# Patient Record
Sex: FEMALE | Race: WHITE | NOT HISPANIC OR LATINO
[De-identification: names, ages, dates, MRNs, and addresses within clinical notes are randomized per-mention and may not be internally consistent; named-entity substitution may affect disease eponyms.]

---

## 2017-01-17 ENCOUNTER — LABORATORY RESULT (OUTPATIENT)
Age: 77
End: 2017-01-17

## 2017-01-17 ENCOUNTER — APPOINTMENT (OUTPATIENT)
Dept: RHEUMATOLOGY | Facility: CLINIC | Age: 77
End: 2017-01-17

## 2017-01-31 ENCOUNTER — APPOINTMENT (OUTPATIENT)
Dept: OPHTHALMOLOGY | Facility: CLINIC | Age: 77
End: 2017-01-31

## 2017-01-31 DIAGNOSIS — H10.33 UNSPECIFIED ACUTE CONJUNCTIVITIS, BILATERAL: ICD-10-CM

## 2017-01-31 DIAGNOSIS — B30.9 VIRAL CONJUNCTIVITIS, UNSPECIFIED: ICD-10-CM

## 2017-02-08 ENCOUNTER — APPOINTMENT (OUTPATIENT)
Dept: RHEUMATOLOGY | Facility: CLINIC | Age: 77
End: 2017-02-08

## 2017-02-08 VITALS
SYSTOLIC BLOOD PRESSURE: 127 MMHG | WEIGHT: 1 LBS | HEART RATE: 85 BPM | OXYGEN SATURATION: 96 % | TEMPERATURE: 97 F | DIASTOLIC BLOOD PRESSURE: 78 MMHG

## 2017-02-13 ENCOUNTER — APPOINTMENT (OUTPATIENT)
Dept: RHEUMATOLOGY | Facility: CLINIC | Age: 77
End: 2017-02-13

## 2017-02-27 ENCOUNTER — APPOINTMENT (OUTPATIENT)
Dept: OPHTHALMOLOGY | Facility: CLINIC | Age: 77
End: 2017-02-27

## 2017-03-20 ENCOUNTER — LABORATORY RESULT (OUTPATIENT)
Age: 77
End: 2017-03-20

## 2017-03-20 ENCOUNTER — RX RENEWAL (OUTPATIENT)
Age: 77
End: 2017-03-20

## 2017-03-20 ENCOUNTER — APPOINTMENT (OUTPATIENT)
Dept: RHEUMATOLOGY | Facility: CLINIC | Age: 77
End: 2017-03-20

## 2017-04-03 ENCOUNTER — APPOINTMENT (OUTPATIENT)
Dept: OPHTHALMOLOGY | Facility: CLINIC | Age: 77
End: 2017-04-03

## 2017-04-17 ENCOUNTER — LABORATORY RESULT (OUTPATIENT)
Age: 77
End: 2017-04-17

## 2017-04-17 ENCOUNTER — APPOINTMENT (OUTPATIENT)
Dept: RHEUMATOLOGY | Facility: CLINIC | Age: 77
End: 2017-04-17

## 2017-04-26 ENCOUNTER — APPOINTMENT (OUTPATIENT)
Dept: OPHTHALMOLOGY | Facility: CLINIC | Age: 77
End: 2017-04-26

## 2017-05-15 ENCOUNTER — LABORATORY RESULT (OUTPATIENT)
Age: 77
End: 2017-05-15

## 2017-05-15 ENCOUNTER — APPOINTMENT (OUTPATIENT)
Dept: RHEUMATOLOGY | Facility: CLINIC | Age: 77
End: 2017-05-15

## 2017-05-22 ENCOUNTER — APPOINTMENT (OUTPATIENT)
Dept: OPHTHALMOLOGY | Facility: CLINIC | Age: 77
End: 2017-05-22

## 2017-06-05 ENCOUNTER — APPOINTMENT (OUTPATIENT)
Dept: OPHTHALMOLOGY | Facility: CLINIC | Age: 77
End: 2017-06-05

## 2017-06-08 ENCOUNTER — LABORATORY RESULT (OUTPATIENT)
Age: 77
End: 2017-06-08

## 2017-06-08 ENCOUNTER — APPOINTMENT (OUTPATIENT)
Dept: RHEUMATOLOGY | Facility: CLINIC | Age: 77
End: 2017-06-08

## 2017-06-12 ENCOUNTER — APPOINTMENT (OUTPATIENT)
Dept: OPHTHALMOLOGY | Facility: CLINIC | Age: 77
End: 2017-06-12

## 2017-06-22 ENCOUNTER — APPOINTMENT (OUTPATIENT)
Dept: OPHTHALMOLOGY | Facility: CLINIC | Age: 77
End: 2017-06-22

## 2017-07-10 ENCOUNTER — APPOINTMENT (OUTPATIENT)
Dept: RHEUMATOLOGY | Facility: CLINIC | Age: 77
End: 2017-07-10

## 2017-07-10 ENCOUNTER — LABORATORY RESULT (OUTPATIENT)
Age: 77
End: 2017-07-10

## 2017-07-14 ENCOUNTER — APPOINTMENT (OUTPATIENT)
Dept: OPHTHALMOLOGY | Facility: CLINIC | Age: 77
End: 2017-07-14

## 2017-07-17 ENCOUNTER — APPOINTMENT (OUTPATIENT)
Dept: OPHTHALMOLOGY | Facility: CLINIC | Age: 77
End: 2017-07-17

## 2017-07-26 ENCOUNTER — APPOINTMENT (OUTPATIENT)
Dept: OPHTHALMOLOGY | Facility: CLINIC | Age: 77
End: 2017-07-26

## 2017-07-28 ENCOUNTER — APPOINTMENT (OUTPATIENT)
Dept: OPHTHALMOLOGY | Facility: CLINIC | Age: 77
End: 2017-07-28
Payer: MEDICARE

## 2017-07-28 PROCEDURE — 92331: CPT | Mod: NC

## 2017-07-28 RX ORDER — SODIUM CHLORIDE FOR INHALATION 0.9 %
0.9 VIAL, NEBULIZER (ML) INHALATION
Qty: 100 | Refills: 8 | Status: ACTIVE | COMMUNITY
Start: 2017-07-28 | End: 1900-01-01

## 2017-07-30 ENCOUNTER — EMERGENCY (EMERGENCY)
Facility: HOSPITAL | Age: 77
LOS: 1 days | Discharge: HOME | End: 2017-07-30
Attending: EMERGENCY MEDICINE | Admitting: EMERGENCY MEDICINE
Payer: MEDICARE

## 2017-07-30 VITALS
DIASTOLIC BLOOD PRESSURE: 64 MMHG | TEMPERATURE: 98 F | RESPIRATION RATE: 16 BRPM | OXYGEN SATURATION: 100 % | HEART RATE: 68 BPM | SYSTOLIC BLOOD PRESSURE: 149 MMHG

## 2017-07-30 DIAGNOSIS — Z98.89 OTHER SPECIFIED POSTPROCEDURAL STATES: Chronic | ICD-10-CM

## 2017-07-30 DIAGNOSIS — H26.9 UNSPECIFIED CATARACT: Chronic | ICD-10-CM

## 2017-07-30 PROCEDURE — 99284 EMERGENCY DEPT VISIT MOD MDM: CPT | Mod: GC

## 2017-07-30 RX ORDER — OFLOXACIN 0.3 %
1 DROPS OPHTHALMIC (EYE)
Qty: 1 | Refills: 0 | OUTPATIENT
Start: 2017-07-30 | End: 2017-08-06

## 2017-07-30 NOTE — ED PROVIDER NOTE - PMH
Biliary cirrhosis    Hypothyroid    Nephrolithiasis    Osteopenia    Rheumatoid arthritis    Sjoegren syndrome    Type 2 diabetes mellitus

## 2017-07-30 NOTE — ED PROVIDER NOTE - OBJECTIVE STATEMENT
78 y/o F pt w pmhx of RA, DM, Hypothyroidism presents with c/o probable left eye lens in her eye that she is not sure is in or out today. Pt states she placed her lenses in both eyes and when she tried to take them out she was only able to take right one out. Pt is not sure if she was actually able to place the lens initially. Pt states these are hard lenses and she is supposed to dip her eye in the solution to get the lens in. Pt denies blurry vision, double vision, eye pain, watery discharge, drainage.

## 2017-07-30 NOTE — ED PROVIDER NOTE - ATTENDING CONTRIBUTION TO CARE
pt with questionable FB sensation to L eye.  Exam without any FB.  Question small abrasion.  Will dc home

## 2017-07-30 NOTE — ED PROVIDER NOTE - CHPI ED SYMPTOMS NEG
no itching/no discharge/no double vision/no blurred vision/no eye lid swelling/no drainage/no foreign body

## 2017-07-30 NOTE — ED PROVIDER NOTE - CARE PLAN
Instructions for follow-up, activity and diet:	Rest, drink plenty of fluids.  Advance activity as tolerated.  Continue all previously prescribed medications as directed.  Take Ofloxacin in effected eye as directed. Follow up with your primary care physician in 48-72 hours- bring copies of your results.  Return to the ER for worsening or persistent symptoms, and/or ANY NEW OR CONCERNING SYMPTOMS. If you have issues obtaining follow up, please call: [phone] to obtain a doctor or specialist who takes your insurance in your area. Instructions for follow-up, activity and diet:	Rest, drink plenty of fluids.  Advance activity as tolerated.  Continue all previously prescribed medications as directed.  Take Ofloxacin in effected eye as directed. Follow up with your primary care physician in 48-72 hours.  Return to the ER for worsening or persistent symptoms, and/or ANY NEW OR CONCERNING SYMPTOMS. If you have issues obtaining follow up, please call: [phone] to obtain a doctor or specialist who takes your insurance in your area. Principal Discharge DX:	Corneal abrasion  Instructions for follow-up, activity and diet:	Rest, drink plenty of fluids.  Advance activity as tolerated.  Continue all previously prescribed medications as directed.  Take Ofloxacin in effected eye as directed. Follow up with your primary care physician in 48-72 hours.  Return to the ER for worsening or persistent symptoms, and/or ANY NEW OR CONCERNING SYMPTOMS. If you have issues obtaining follow up, please call: [phone] to obtain a doctor or specialist who takes your insurance in your area. Principal Discharge DX:	Corneal abrasion  Instructions for follow-up, activity and diet:	Rest, drink plenty of fluids.  Advance activity as tolerated.  Continue all previously prescribed medications as directed.  Take Ofloxacin in effected eye as directed. Follow up with your primary care physician in 48-72 hours.  Return to the ER for worsening or persistent symptoms, and/or ANY NEW OR CONCERNING SYMPTOMS. If you have issues obtaining follow up, please call: [phone] to obtain a doctor or specialist who takes your insurance in your area.

## 2017-07-30 NOTE — ED PROVIDER NOTE - MEDICAL DECISION MAKING DETAILS
78 y/o pt presents to confirm if she has lens in her left eye  foreign body (lens) in left eye, corneal abrasion  fluorescin stain, slit lamp

## 2017-07-30 NOTE — ED ADULT TRIAGE NOTE - CHIEF COMPLAINT QUOTE
pt got new contact lenses, put one in this morning and can not tell if it is still in left eye. eye appears red, watery. denies blurry vision.

## 2017-07-30 NOTE — ED PROVIDER NOTE - PLAN OF CARE
Rest, drink plenty of fluids.  Advance activity as tolerated.  Continue all previously prescribed medications as directed.  Take Ofloxacin in effected eye as directed. Follow up with your primary care physician in 48-72 hours- bring copies of your results.  Return to the ER for worsening or persistent symptoms, and/or ANY NEW OR CONCERNING SYMPTOMS. If you have issues obtaining follow up, please call: [phone] to obtain a doctor or specialist who takes your insurance in your area. Rest, drink plenty of fluids.  Advance activity as tolerated.  Continue all previously prescribed medications as directed.  Take Ofloxacin in effected eye as directed. Follow up with your primary care physician in 48-72 hours.  Return to the ER for worsening or persistent symptoms, and/or ANY NEW OR CONCERNING SYMPTOMS. If you have issues obtaining follow up, please call: [phone] to obtain a doctor or specialist who takes your insurance in your area.

## 2017-08-07 ENCOUNTER — LABORATORY RESULT (OUTPATIENT)
Age: 77
End: 2017-08-07

## 2017-08-07 ENCOUNTER — APPOINTMENT (OUTPATIENT)
Dept: RHEUMATOLOGY | Facility: CLINIC | Age: 77
End: 2017-08-07
Payer: MEDICARE

## 2017-08-07 PROCEDURE — 36415 COLL VENOUS BLD VENIPUNCTURE: CPT

## 2017-08-07 PROCEDURE — 96413 CHEMO IV INFUSION 1 HR: CPT

## 2017-08-08 ENCOUNTER — APPOINTMENT (OUTPATIENT)
Dept: OPHTHALMOLOGY | Facility: CLINIC | Age: 77
End: 2017-08-08
Payer: MEDICARE

## 2017-08-08 PROCEDURE — 92331: CPT | Mod: NC

## 2017-08-09 ENCOUNTER — APPOINTMENT (OUTPATIENT)
Dept: OPHTHALMOLOGY | Facility: CLINIC | Age: 77
End: 2017-08-09
Payer: MEDICARE

## 2017-08-09 PROCEDURE — 92012 INTRM OPH EXAM EST PATIENT: CPT

## 2017-09-01 ENCOUNTER — LABORATORY RESULT (OUTPATIENT)
Age: 77
End: 2017-09-01

## 2017-09-01 PROCEDURE — 36415 COLL VENOUS BLD VENIPUNCTURE: CPT

## 2017-09-01 PROCEDURE — 96413 CHEMO IV INFUSION 1 HR: CPT

## 2017-09-05 ENCOUNTER — APPOINTMENT (OUTPATIENT)
Dept: RHEUMATOLOGY | Facility: CLINIC | Age: 77
End: 2017-09-05
Payer: MEDICARE

## 2017-09-05 ENCOUNTER — APPOINTMENT (OUTPATIENT)
Dept: RHEUMATOLOGY | Facility: CLINIC | Age: 77
End: 2017-09-05

## 2017-09-06 ENCOUNTER — APPOINTMENT (OUTPATIENT)
Dept: OPHTHALMOLOGY | Facility: CLINIC | Age: 77
End: 2017-09-06
Payer: MEDICARE

## 2017-09-06 PROCEDURE — 92331: CPT | Mod: NC

## 2017-09-14 ENCOUNTER — FORM ENCOUNTER (OUTPATIENT)
Age: 77
End: 2017-09-14

## 2017-09-15 ENCOUNTER — APPOINTMENT (OUTPATIENT)
Dept: RADIOLOGY | Facility: CLINIC | Age: 77
End: 2017-09-15
Payer: MEDICARE

## 2017-09-15 ENCOUNTER — OUTPATIENT (OUTPATIENT)
Dept: OUTPATIENT SERVICES | Facility: HOSPITAL | Age: 77
LOS: 1 days | End: 2017-09-15
Payer: MEDICARE

## 2017-09-15 ENCOUNTER — APPOINTMENT (OUTPATIENT)
Dept: RHEUMATOLOGY | Facility: CLINIC | Age: 77
End: 2017-09-15
Payer: MEDICARE

## 2017-09-15 VITALS
TEMPERATURE: 98.4 F | DIASTOLIC BLOOD PRESSURE: 83 MMHG | HEART RATE: 74 BPM | SYSTOLIC BLOOD PRESSURE: 136 MMHG | OXYGEN SATURATION: 98 %

## 2017-09-15 DIAGNOSIS — M17.10 UNILATERAL PRIMARY OSTEOARTHRITIS, UNSPECIFIED KNEE: ICD-10-CM

## 2017-09-15 DIAGNOSIS — M25.552 PAIN IN LEFT HIP: ICD-10-CM

## 2017-09-15 DIAGNOSIS — H26.9 UNSPECIFIED CATARACT: Chronic | ICD-10-CM

## 2017-09-15 DIAGNOSIS — M06.9 RHEUMATOID ARTHRITIS, UNSPECIFIED: ICD-10-CM

## 2017-09-15 DIAGNOSIS — M25.552 PAIN IN RIGHT HIP: ICD-10-CM

## 2017-09-15 DIAGNOSIS — M25.551 PAIN IN RIGHT HIP: ICD-10-CM

## 2017-09-15 DIAGNOSIS — Z98.89 OTHER SPECIFIED POSTPROCEDURAL STATES: Chronic | ICD-10-CM

## 2017-09-15 PROCEDURE — 72100 X-RAY EXAM L-S SPINE 2/3 VWS: CPT | Mod: 26

## 2017-09-15 PROCEDURE — 73522 X-RAY EXAM HIPS BI 3-4 VIEWS: CPT | Mod: 26

## 2017-09-15 PROCEDURE — 73562 X-RAY EXAM OF KNEE 3: CPT | Mod: 26,50

## 2017-09-15 PROCEDURE — 73562 X-RAY EXAM OF KNEE 3: CPT

## 2017-09-15 PROCEDURE — 99214 OFFICE O/P EST MOD 30 MIN: CPT

## 2017-09-15 PROCEDURE — 73522 X-RAY EXAM HIPS BI 3-4 VIEWS: CPT

## 2017-09-15 PROCEDURE — 72100 X-RAY EXAM L-S SPINE 2/3 VWS: CPT

## 2017-09-20 DIAGNOSIS — M25.559 PAIN IN UNSPECIFIED HIP: ICD-10-CM

## 2017-09-20 DIAGNOSIS — M17.12 UNILATERAL PRIMARY OSTEOARTHRITIS, LEFT KNEE: ICD-10-CM

## 2017-09-20 DIAGNOSIS — M54.9 DORSALGIA, UNSPECIFIED: ICD-10-CM

## 2017-09-20 DIAGNOSIS — M25.561 PAIN IN RIGHT KNEE: ICD-10-CM

## 2017-09-20 DIAGNOSIS — Z96.651 PRESENCE OF RIGHT ARTIFICIAL KNEE JOINT: ICD-10-CM

## 2017-10-02 ENCOUNTER — LABORATORY RESULT (OUTPATIENT)
Age: 77
End: 2017-10-02

## 2017-10-02 ENCOUNTER — APPOINTMENT (OUTPATIENT)
Dept: RHEUMATOLOGY | Facility: CLINIC | Age: 77
End: 2017-10-02
Payer: MEDICARE

## 2017-10-02 ENCOUNTER — MESSAGE (OUTPATIENT)
Age: 77
End: 2017-10-02

## 2017-10-02 PROCEDURE — 96413 CHEMO IV INFUSION 1 HR: CPT

## 2017-10-02 PROCEDURE — 36415 COLL VENOUS BLD VENIPUNCTURE: CPT

## 2017-10-02 PROCEDURE — G0008: CPT

## 2017-10-02 PROCEDURE — 90686 IIV4 VACC NO PRSV 0.5 ML IM: CPT

## 2017-10-04 ENCOUNTER — APPOINTMENT (OUTPATIENT)
Dept: OPHTHALMOLOGY | Facility: CLINIC | Age: 77
End: 2017-10-04
Payer: MEDICARE

## 2017-10-04 PROCEDURE — 92012 INTRM OPH EXAM EST PATIENT: CPT

## 2017-10-04 RX ORDER — OFLOXACIN 3 MG/ML
0.3 SOLUTION/ DROPS OPHTHALMIC 4 TIMES DAILY
Qty: 5 | Refills: 1 | Status: DISCONTINUED | COMMUNITY
Start: 2017-01-31 | End: 2017-10-04

## 2017-10-04 RX ORDER — LOTEPREDNOL ETABONATE 5 MG/G
0.5 OINTMENT OPHTHALMIC
Qty: 1 | Refills: 1 | Status: DISCONTINUED | COMMUNITY
Start: 2017-04-03 | End: 2017-10-04

## 2017-11-03 ENCOUNTER — LABORATORY RESULT (OUTPATIENT)
Age: 77
End: 2017-11-03

## 2017-11-03 ENCOUNTER — APPOINTMENT (OUTPATIENT)
Dept: RHEUMATOLOGY | Facility: CLINIC | Age: 77
End: 2017-11-03
Payer: MEDICARE

## 2017-11-03 PROCEDURE — 96413 CHEMO IV INFUSION 1 HR: CPT

## 2017-11-03 PROCEDURE — 36415 COLL VENOUS BLD VENIPUNCTURE: CPT

## 2017-11-05 ENCOUNTER — INPATIENT (INPATIENT)
Facility: HOSPITAL | Age: 77
LOS: 1 days | Discharge: HOME | DRG: 63 | End: 2017-11-07
Attending: PSYCHIATRY & NEUROLOGY | Admitting: PSYCHIATRY & NEUROLOGY
Payer: MEDICARE

## 2017-11-05 VITALS
RESPIRATION RATE: 20 BRPM | OXYGEN SATURATION: 97 % | HEART RATE: 78 BPM | DIASTOLIC BLOOD PRESSURE: 66 MMHG | SYSTOLIC BLOOD PRESSURE: 136 MMHG

## 2017-11-05 DIAGNOSIS — Z98.89 OTHER SPECIFIED POSTPROCEDURAL STATES: Chronic | ICD-10-CM

## 2017-11-05 DIAGNOSIS — H26.9 UNSPECIFIED CATARACT: Chronic | ICD-10-CM

## 2017-11-05 DIAGNOSIS — I63.9 CEREBRAL INFARCTION, UNSPECIFIED: ICD-10-CM

## 2017-11-05 LAB
ALBUMIN SERPL ELPH-MCNC: 4 G/DL — SIGNIFICANT CHANGE UP (ref 3.3–5)
ALP SERPL-CCNC: 128 U/L — HIGH (ref 40–120)
ALT FLD-CCNC: 21 U/L RC — SIGNIFICANT CHANGE UP (ref 10–45)
ANION GAP SERPL CALC-SCNC: 12 MMOL/L — SIGNIFICANT CHANGE UP (ref 5–17)
APTT BLD: 30 SEC — SIGNIFICANT CHANGE UP (ref 27.5–37.4)
AST SERPL-CCNC: 18 U/L — SIGNIFICANT CHANGE UP (ref 10–40)
BASOPHILS # BLD AUTO: 0.1 K/UL — SIGNIFICANT CHANGE UP (ref 0–0.2)
BASOPHILS NFR BLD AUTO: 1.4 % — SIGNIFICANT CHANGE UP (ref 0–2)
BILIRUB SERPL-MCNC: 0.2 MG/DL — SIGNIFICANT CHANGE UP (ref 0.2–1.2)
BUN SERPL-MCNC: 25 MG/DL — HIGH (ref 7–23)
CALCIUM SERPL-MCNC: 9.9 MG/DL — SIGNIFICANT CHANGE UP (ref 8.4–10.5)
CHLORIDE SERPL-SCNC: 105 MMOL/L — SIGNIFICANT CHANGE UP (ref 96–108)
CK MB CFR SERPL CALC: 1.6 NG/ML — SIGNIFICANT CHANGE UP (ref 0–3.8)
CO2 SERPL-SCNC: 24 MMOL/L — SIGNIFICANT CHANGE UP (ref 22–31)
CREAT SERPL-MCNC: 1.01 MG/DL — SIGNIFICANT CHANGE UP (ref 0.5–1.3)
EOSINOPHIL # BLD AUTO: 0.4 K/UL — SIGNIFICANT CHANGE UP (ref 0–0.5)
EOSINOPHIL NFR BLD AUTO: 5.1 % — SIGNIFICANT CHANGE UP (ref 0–6)
GLUCOSE SERPL-MCNC: 122 MG/DL — HIGH (ref 70–99)
HCT VFR BLD CALC: 42.6 % — SIGNIFICANT CHANGE UP (ref 34.5–45)
HGB BLD-MCNC: 14.1 G/DL — SIGNIFICANT CHANGE UP (ref 11.5–15.5)
INR BLD: 1.05 RATIO — SIGNIFICANT CHANGE UP (ref 0.88–1.16)
LYMPHOCYTES # BLD AUTO: 2.9 K/UL — SIGNIFICANT CHANGE UP (ref 1–3.3)
LYMPHOCYTES # BLD AUTO: 35.1 % — SIGNIFICANT CHANGE UP (ref 13–44)
MCHC RBC-ENTMCNC: 33 GM/DL — SIGNIFICANT CHANGE UP (ref 32–36)
MCHC RBC-ENTMCNC: 33.1 PG — SIGNIFICANT CHANGE UP (ref 27–34)
MCV RBC AUTO: 100 FL — SIGNIFICANT CHANGE UP (ref 80–100)
MONOCYTES # BLD AUTO: 0.7 K/UL — SIGNIFICANT CHANGE UP (ref 0–0.9)
MONOCYTES NFR BLD AUTO: 8.4 % — SIGNIFICANT CHANGE UP (ref 2–14)
NEUTROPHILS # BLD AUTO: 4.2 K/UL — SIGNIFICANT CHANGE UP (ref 1.8–7.4)
NEUTROPHILS NFR BLD AUTO: 50 % — SIGNIFICANT CHANGE UP (ref 43–77)
PLATELET # BLD AUTO: 334 K/UL — SIGNIFICANT CHANGE UP (ref 150–400)
POTASSIUM SERPL-MCNC: 4.1 MMOL/L — SIGNIFICANT CHANGE UP (ref 3.5–5.3)
POTASSIUM SERPL-SCNC: 4.1 MMOL/L — SIGNIFICANT CHANGE UP (ref 3.5–5.3)
PROT SERPL-MCNC: 6.8 G/DL — SIGNIFICANT CHANGE UP (ref 6–8.3)
PROTHROM AB SERPL-ACNC: 11.3 SEC — SIGNIFICANT CHANGE UP (ref 9.8–12.7)
RBC # BLD: 4.24 M/UL — SIGNIFICANT CHANGE UP (ref 3.8–5.2)
RBC # FLD: 13 % — SIGNIFICANT CHANGE UP (ref 10.3–14.5)
SODIUM SERPL-SCNC: 141 MMOL/L — SIGNIFICANT CHANGE UP (ref 135–145)
TROPONIN T SERPL-MCNC: <0.01 NG/ML — SIGNIFICANT CHANGE UP (ref 0–0.06)
WBC # BLD: 8.3 K/UL — SIGNIFICANT CHANGE UP (ref 3.8–10.5)
WBC # FLD AUTO: 8.3 K/UL — SIGNIFICANT CHANGE UP (ref 3.8–10.5)

## 2017-11-05 PROCEDURE — 70496 CT ANGIOGRAPHY HEAD: CPT | Mod: 26

## 2017-11-05 PROCEDURE — 70498 CT ANGIOGRAPHY NECK: CPT | Mod: 26

## 2017-11-05 PROCEDURE — 71010: CPT | Mod: 26

## 2017-11-05 PROCEDURE — 99291 CRITICAL CARE FIRST HOUR: CPT

## 2017-11-05 RX ORDER — ALTEPLASE 100 MG
75.1 KIT INTRAVENOUS ONCE
Qty: 0 | Refills: 0 | Status: COMPLETED | OUTPATIENT
Start: 2017-11-05 | End: 2017-11-05

## 2017-11-05 RX ORDER — LEVOTHYROXINE SODIUM 125 MCG
112 TABLET ORAL DAILY
Qty: 0 | Refills: 0 | Status: DISCONTINUED | OUTPATIENT
Start: 2017-11-05 | End: 2017-11-07

## 2017-11-05 RX ORDER — ATORVASTATIN CALCIUM 80 MG/1
80 TABLET, FILM COATED ORAL AT BEDTIME
Qty: 0 | Refills: 0 | Status: DISCONTINUED | OUTPATIENT
Start: 2017-11-05 | End: 2017-11-07

## 2017-11-05 RX ORDER — SODIUM CHLORIDE 9 MG/ML
1000 INJECTION INTRAMUSCULAR; INTRAVENOUS; SUBCUTANEOUS
Qty: 0 | Refills: 0 | Status: DISCONTINUED | OUTPATIENT
Start: 2017-11-05 | End: 2017-11-06

## 2017-11-05 RX ORDER — ASPIRIN/CALCIUM CARB/MAGNESIUM 324 MG
81 TABLET ORAL DAILY
Qty: 0 | Refills: 0 | Status: DISCONTINUED | OUTPATIENT
Start: 2017-11-05 | End: 2017-11-06

## 2017-11-05 RX ORDER — METFORMIN HYDROCHLORIDE 850 MG/1
1 TABLET ORAL
Qty: 0 | Refills: 0 | COMMUNITY

## 2017-11-05 RX ORDER — METHOTREXATE 2.5 MG/1
12.5 TABLET ORAL
Qty: 0 | Refills: 0 | Status: DISCONTINUED | OUTPATIENT
Start: 2017-11-05 | End: 2017-11-07

## 2017-11-05 RX ORDER — DULOXETINE HYDROCHLORIDE 30 MG/1
60 CAPSULE, DELAYED RELEASE ORAL DAILY
Qty: 0 | Refills: 0 | Status: DISCONTINUED | OUTPATIENT
Start: 2017-11-05 | End: 2017-11-07

## 2017-11-05 RX ORDER — ALTEPLASE 100 MG
8.3 KIT INTRAVENOUS ONCE
Qty: 0 | Refills: 0 | Status: COMPLETED | OUTPATIENT
Start: 2017-11-05 | End: 2017-11-05

## 2017-11-05 RX ORDER — METHOTREXATE 2.5 MG/1
1 TABLET ORAL
Qty: 0 | Refills: 0 | COMMUNITY

## 2017-11-05 RX ADMIN — ALTEPLASE 75.1 MILLIGRAM(S): KIT at 16:43

## 2017-11-05 RX ADMIN — METHOTREXATE 12.5 MILLIGRAM(S): 2.5 TABLET ORAL at 18:03

## 2017-11-05 RX ADMIN — ALTEPLASE 498 MILLIGRAM(S): KIT at 16:42

## 2017-11-05 RX ADMIN — SODIUM CHLORIDE 100 MILLILITER(S): 9 INJECTION INTRAMUSCULAR; INTRAVENOUS; SUBCUTANEOUS at 19:00

## 2017-11-05 NOTE — H&P ADULT - ATTENDING COMMENTS
The patient was seen and examined by me. Imaging (CT head, CTA neck and head) reviewed by me. This is a 77F with T2DM, RA, who presents with acute confusional sta5te, with decrease short term memory, formed visual hallucinations, and ataxia. CT head was negative. She was treated with IV tPA and has been improving since. She is currently awake and alert, Ox3, fluent, follows commands, EOMI, VFF, face symmetric, no drift, 5/5 power x 4 extremities, normal sensation to LT, normal FTN coordination. CTA shows no large vessel occlusion, possible left carotid stenosis versus streak artifact, and no other stenosis. Impression: Cerebral embolism with infarction possible thalamic or brainstem. Plan is for MRI brain, MRA neck with gado, telemetry monitoring, TTE, PT/OT evals, passed dysphagia, advance diet as tolerated, Start ASA/Lovenox VTE prophylaxis 24 hrs post TPA. Continue post tPA neuro checks and VS monitoring, BP,180/105. RISS for T2DM, holding Metformin because of contrast dye yesterday. She is not a smoker. All of her questions were addressed.

## 2017-11-05 NOTE — H&P ADULT - ASSESSMENT
76 yo wf, p/w acute onset of vertigo, peduncular hallucinosis, double vision and imbalance from 13:10, symptoms were resolved in ED however she continued to have imbalance. In exam she was loosing balance to the right, no other finding. tPA discussed and the patient was amenable to it due to importance of ability to ambulate. NIHSS 2, MRS 0  tPA was given at 16:40  plan:  - Admit   - C/W telemetry      - F/U EKG  - C/W ASA 81 daily /Statin 80 mg daily /Heparin SQ for VTE prophylaxis  - F/U TTE  - F/U carotid Duplex    - MRI head non contrast  - MRA H&N  - Repeat CTH in 24 hours   - CBC AM daily  - BMP AM daily  - Lipid panel  - HbA1C  - PT/OT  - Dysphagia screen if fails, Swallow evaluation  - Permissive HTN for 24 hours up to 185/105

## 2017-11-05 NOTE — ED ADULT NURSE NOTE - OBJECTIVE STATEMENT
77y female presents to ED by EMS complaining of AMS, dizziness and unsteady gait. Patients daughter explained that patient was last heard normal at 13:10, patient was supposed to pick daughter up from train and forgot the conversation. When daughter called home to be picked up, patient drove to station - daughter explained that patient drove onto the curb, and seemed altered, when patient got out of the car the daughter states that she was unsteady while walking. Onset of symptoms occurred approximately 14:00. Patient denies chest pain, SOB, abdominal pain, n/v/d, chills and is afebrile. Patient is unsteady on her feet in ED, states that her vision seems blurred, complains of headache. Extremities strong, PERRLA, mentation intact. No facial droop or slurred speech. Code stroke called 15:44.

## 2017-11-05 NOTE — ED PROVIDER NOTE - MEDICAL DECISION MAKING DETAILS
Carlos Manuel: Patient with confusion and ataxia bib ems with daughter. no weakness at this time. ataxic. Code stroke activated.  neuro team at bedside, ct scan ordered, reassess.

## 2017-11-05 NOTE — H&P ADULT - NSHPREVIEWOFSYSTEMS_GEN_ALL_CORE
HENT: no headache, no dizziness, no blurry vision and double vision at the time  Chest: No chest pain, no SOB, no cough  Abdomen: No nausea, no vomitting, no pain  ext: no swelling, no pain, no discoloration

## 2017-11-05 NOTE — H&P ADULT - NSHPPHYSICALEXAM_GEN_ALL_CORE
Neurological Exam:  Mental Status: Orientated to self, date and place.    Attention intact.  No dysarthria, aphasia or neglect. Does not remember the incidence accurately.    Cranial Nerves: CN I - not tested.  PERRL, EOMI, VFF, no nystagmus or diplopia.  No APD.  Fundi not visualized bilaterally.  CN V1-3 intact to light touch and pinprick.  No facial asymmetry.  Hearing intact to finger rub bilaterally.  Tongue, uvula and palate midline.  Sternocleidomastoid and Trapezius intact bilaterally.    Motor:   Tone: normal.                  Strength:     [] Upper extremity                      Delt       Bicep    Tricep                                                  R         5/5 5/5 5/5 5/5                                               L          5/5 5/5 5/5 5/5  [] Lower extremity                       HF          KE          KF        DF         PF                                               R        5/5 5/5 5/5 5/5 5/5                                               L         5/5 5/5 5/5 5/5 5/5  Pronator drift: none                 Dysmetria: None to finger-nose-finger or heel-shin-heel  No truncal ataxia.    Tremor: No resting, postural or action tremor.  No myoclonus.    Sensation: intact to light touch, pinprick, vibration and proprioception    Deep Tendon Reflexes: 1+ bilateral biceps, triceps, brachioradialis, knee and ankle  Toes flexor bilaterally    Gait: unstable, small steps, combination of ataxia and falling to the left and musculoskeletal gait difficulty.

## 2017-11-05 NOTE — H&P ADULT - HISTORY OF PRESENT ILLNESS
The patient is a 76 yo f, with PMHx of RA, and hypothyroidism who p/w acute onset of confusion, double vision, vertigo, and visual hallucinations. Last well known time was at 13:10, when she spoke with her daughter and suppose to go after her. She can't describe her exact symptoms and time of onset after that. She notices double vision, seeing small cars on the grass, seeing small people. She manages to find the daughter who needed to take her out fo the car, she states that she was imbalance and needed help to move around and was also confused. By the time in the Ed the patient was quickly going back to her baseline.

## 2017-11-05 NOTE — H&P ADULT - NSHPLABSRESULTS_GEN_ALL_CORE
< from: CT Angio Head w/ IV Cont (11.05.17 @ 16:08) >    IMPRESSION:   Noncontrast CT brain: There is no evidence of acute intracranial   hemorrhage, extra-axial collection, vasogenic edema, mass effect, midline   shift, central herniation, hydrocephalus or transcortical infarct.     CT angiography neck: Noevidence of hemodynamically significant stenosis   using NASCET criteria.  Patent vertebral arteries.  No evidence of   vascular dissection.    CT angiography brain: No major vessel occlusion or proximal stenosis.    Normal anatomic variants as discussed in the body the report.      < end of copied text >

## 2017-11-05 NOTE — ED PROVIDER NOTE - CRITICAL CARE PROVIDED
telephone consultation with the patient's family/additional history taking/consult w/ pt's family directly relating to pts condition/interpretation of diagnostic studies/direct patient care (not related to procedure)/consultation with other physicians

## 2017-11-05 NOTE — ED PROVIDER NOTE - PROGRESS NOTE DETAILS
Decision by neuro team that patient is candidate for tpa. Patient asking daughters for approval. Daughter requesting few extra minutes to consent while they talk to family member on phone. Daughters agree to tpa. tpa given by neuro team. Decision by neuro team that patient is candidate for tpa. Patient asking daughters for approval. Daughter requesting few extra minutes to consent while they talk to family member on phone. Advised it is time sensitive.

## 2017-11-05 NOTE — ED PROVIDER NOTE - OBJECTIVE STATEMENT
78 yo F, PMHx of RA and hypothyroid presents to ED w/acute onset of confusion, double vision, vertigo, and visual hallucinations. Last known normal was 13:10 77 year old female with pmhx of RA and hypothyroid bib ems with daughter presents with  acute onset of confusion, double vision, vertigo.. Last well known time was at 13:10, when she spoke with her daughter on phone and supposed to go  daughter from train station. Daughter was at train station waiting and called her back and patient did not recall conversation or task to pick her up.  Patient showed up to train station and was not driving properly. After getting out of the car, daughter noticed patient ataxic gait. Patient brought to ER, still not able to recall conversation.  No double vision at this time.

## 2017-11-06 LAB
ALBUMIN SERPL ELPH-MCNC: 3.4 G/DL — SIGNIFICANT CHANGE UP (ref 3.3–5)
ALP SERPL-CCNC: 124 U/L — HIGH (ref 40–120)
ALT FLD-CCNC: 19 U/L — SIGNIFICANT CHANGE UP (ref 10–45)
ANION GAP SERPL CALC-SCNC: 13 MMOL/L — SIGNIFICANT CHANGE UP (ref 5–17)
AST SERPL-CCNC: 24 U/L — SIGNIFICANT CHANGE UP (ref 10–40)
BASOPHILS # BLD AUTO: 0.05 K/UL — SIGNIFICANT CHANGE UP (ref 0–0.2)
BASOPHILS NFR BLD AUTO: 0.6 % — SIGNIFICANT CHANGE UP (ref 0–2)
BILIRUB SERPL-MCNC: 0.3 MG/DL — SIGNIFICANT CHANGE UP (ref 0.2–1.2)
BUN SERPL-MCNC: 17 MG/DL — SIGNIFICANT CHANGE UP (ref 7–23)
CALCIUM SERPL-MCNC: 9.1 MG/DL — SIGNIFICANT CHANGE UP (ref 8.4–10.5)
CHLORIDE SERPL-SCNC: 106 MMOL/L — SIGNIFICANT CHANGE UP (ref 96–108)
CHOLEST SERPL-MCNC: 195 MG/DL — SIGNIFICANT CHANGE UP (ref 10–199)
CO2 SERPL-SCNC: 22 MMOL/L — SIGNIFICANT CHANGE UP (ref 22–31)
CREAT SERPL-MCNC: 0.75 MG/DL — SIGNIFICANT CHANGE UP (ref 0.5–1.3)
EOSINOPHIL # BLD AUTO: 0.28 K/UL — SIGNIFICANT CHANGE UP (ref 0–0.5)
EOSINOPHIL NFR BLD AUTO: 3.3 % — SIGNIFICANT CHANGE UP (ref 0–6)
GLUCOSE BLDC GLUCOMTR-MCNC: 106 MG/DL — HIGH (ref 70–99)
GLUCOSE BLDC GLUCOMTR-MCNC: 109 MG/DL — HIGH (ref 70–99)
GLUCOSE BLDC GLUCOMTR-MCNC: 75 MG/DL — SIGNIFICANT CHANGE UP (ref 70–99)
GLUCOSE SERPL-MCNC: 93 MG/DL — SIGNIFICANT CHANGE UP (ref 70–99)
HBA1C BLD-MCNC: 5.4 % — SIGNIFICANT CHANGE UP (ref 4–5.6)
HCT VFR BLD CALC: 39.4 % — SIGNIFICANT CHANGE UP (ref 34.5–45)
HDLC SERPL-MCNC: 55 MG/DL — SIGNIFICANT CHANGE UP (ref 40–125)
HGB BLD-MCNC: 12.9 G/DL — SIGNIFICANT CHANGE UP (ref 11.5–15.5)
IMM GRANULOCYTES NFR BLD AUTO: 0.1 % — SIGNIFICANT CHANGE UP (ref 0–1.5)
LIPID PNL WITH DIRECT LDL SERPL: 122 MG/DL — SIGNIFICANT CHANGE UP
LYMPHOCYTES # BLD AUTO: 2.75 K/UL — SIGNIFICANT CHANGE UP (ref 1–3.3)
LYMPHOCYTES # BLD AUTO: 32.8 % — SIGNIFICANT CHANGE UP (ref 13–44)
MCHC RBC-ENTMCNC: 31.8 PG — SIGNIFICANT CHANGE UP (ref 27–34)
MCHC RBC-ENTMCNC: 32.7 GM/DL — SIGNIFICANT CHANGE UP (ref 32–36)
MCV RBC AUTO: 97 FL — SIGNIFICANT CHANGE UP (ref 80–100)
MONOCYTES # BLD AUTO: 0.62 K/UL — SIGNIFICANT CHANGE UP (ref 0–0.9)
MONOCYTES NFR BLD AUTO: 7.4 % — SIGNIFICANT CHANGE UP (ref 2–14)
NEUTROPHILS # BLD AUTO: 4.68 K/UL — SIGNIFICANT CHANGE UP (ref 1.8–7.4)
NEUTROPHILS NFR BLD AUTO: 55.8 % — SIGNIFICANT CHANGE UP (ref 43–77)
PLATELET # BLD AUTO: 306 K/UL — SIGNIFICANT CHANGE UP (ref 150–400)
POTASSIUM SERPL-MCNC: 4 MMOL/L — SIGNIFICANT CHANGE UP (ref 3.5–5.3)
POTASSIUM SERPL-SCNC: 4 MMOL/L — SIGNIFICANT CHANGE UP (ref 3.5–5.3)
PROT SERPL-MCNC: 6.4 G/DL — SIGNIFICANT CHANGE UP (ref 6–8.3)
RBC # BLD: 4.06 M/UL — SIGNIFICANT CHANGE UP (ref 3.8–5.2)
RBC # FLD: 14.1 % — SIGNIFICANT CHANGE UP (ref 10.3–14.5)
SODIUM SERPL-SCNC: 141 MMOL/L — SIGNIFICANT CHANGE UP (ref 135–145)
TOTAL CHOLESTEROL/HDL RATIO MEASUREMENT: 3.5 RATIO — SIGNIFICANT CHANGE UP (ref 3.3–7.1)
TRIGL SERPL-MCNC: 91 MG/DL — SIGNIFICANT CHANGE UP (ref 10–149)
WBC # BLD: 8.39 K/UL — SIGNIFICANT CHANGE UP (ref 3.8–10.5)
WBC # FLD AUTO: 8.39 K/UL — SIGNIFICANT CHANGE UP (ref 3.8–10.5)

## 2017-11-06 PROCEDURE — 70551 MRI BRAIN STEM W/O DYE: CPT | Mod: 26

## 2017-11-06 PROCEDURE — 99223 1ST HOSP IP/OBS HIGH 75: CPT

## 2017-11-06 PROCEDURE — 70544 MR ANGIOGRAPHY HEAD W/O DYE: CPT | Mod: 26,59

## 2017-11-06 PROCEDURE — 70549 MR ANGIOGRAPH NECK W/O&W/DYE: CPT | Mod: 26

## 2017-11-06 RX ORDER — SODIUM CHLORIDE 9 MG/ML
1000 INJECTION, SOLUTION INTRAVENOUS
Qty: 0 | Refills: 0 | Status: DISCONTINUED | OUTPATIENT
Start: 2017-11-06 | End: 2017-11-07

## 2017-11-06 RX ORDER — DEXTROSE 50 % IN WATER 50 %
25 SYRINGE (ML) INTRAVENOUS ONCE
Qty: 0 | Refills: 0 | Status: DISCONTINUED | OUTPATIENT
Start: 2017-11-06 | End: 2017-11-07

## 2017-11-06 RX ORDER — GLUCAGON INJECTION, SOLUTION 0.5 MG/.1ML
1 INJECTION, SOLUTION SUBCUTANEOUS ONCE
Qty: 0 | Refills: 0 | Status: DISCONTINUED | OUTPATIENT
Start: 2017-11-06 | End: 2017-11-07

## 2017-11-06 RX ORDER — INSULIN LISPRO 100/ML
VIAL (ML) SUBCUTANEOUS
Qty: 0 | Refills: 0 | Status: DISCONTINUED | OUTPATIENT
Start: 2017-11-06 | End: 2017-11-07

## 2017-11-06 RX ORDER — DEXTROSE 50 % IN WATER 50 %
12.5 SYRINGE (ML) INTRAVENOUS ONCE
Qty: 0 | Refills: 0 | Status: DISCONTINUED | OUTPATIENT
Start: 2017-11-06 | End: 2017-11-07

## 2017-11-06 RX ORDER — DEXTROSE 50 % IN WATER 50 %
1 SYRINGE (ML) INTRAVENOUS ONCE
Qty: 0 | Refills: 0 | Status: DISCONTINUED | OUTPATIENT
Start: 2017-11-06 | End: 2017-11-07

## 2017-11-06 RX ADMIN — DULOXETINE HYDROCHLORIDE 60 MILLIGRAM(S): 30 CAPSULE, DELAYED RELEASE ORAL at 12:52

## 2017-11-06 RX ADMIN — Medication 112 MICROGRAM(S): at 06:05

## 2017-11-06 RX ADMIN — SODIUM CHLORIDE 100 MILLILITER(S): 9 INJECTION INTRAMUSCULAR; INTRAVENOUS; SUBCUTANEOUS at 06:05

## 2017-11-06 RX ADMIN — ATORVASTATIN CALCIUM 80 MILLIGRAM(S): 80 TABLET, FILM COATED ORAL at 23:13

## 2017-11-06 NOTE — OCCUPATIONAL THERAPY INITIAL EVALUATION ADULT - ADDITIONAL COMMENTS
CT brain: There is no evidence of acute intracranial hemorrhage extra-axial collection, vasogenic edema, mass effect, midline  shift, central herniation, hydrocephalus or transcortical infarct.   CT angiography neck: No evidence of hemodynamically significant stenosis using NASCET criteria.  Patent vertebral arteries.  No evidence of vascular dissection.  CT angiography brain: No major vessel occlusion or proximal stenosis. Normal anatomic variants as discussed in the body the report.

## 2017-11-06 NOTE — OCCUPATIONAL THERAPY INITIAL EVALUATION ADULT - LIVES WITH, PROFILE
Pt lives alone in an apartment with 1 step to enter. Pt has a walk in shower with grab bars in the bathroom. Prior, pt I with ADLs and functional mobility./alone

## 2017-11-06 NOTE — OCCUPATIONAL THERAPY INITIAL EVALUATION ADULT - PRECAUTIONS/LIMITATIONS, REHAB EVAL
seizure precautions/aspiration precautions/fall precautions seizure precautions/aspiration precautions

## 2017-11-06 NOTE — OCCUPATIONAL THERAPY INITIAL EVALUATION ADULT - PERTINENT HX OF CURRENT PROBLEM, REHAB EVAL
78 yo F p/w acute onset of vertigo, peduncular hallucinosis, double vision and imbalance from 13:10, symptoms were resolved in ED however she continued to have imbalance. In exam she was losing balance to the R, no other finding. tPA discussed and the pt was amenable to it due to importance of ability to ambulate. see more..

## 2017-11-07 ENCOUNTER — TRANSCRIPTION ENCOUNTER (OUTPATIENT)
Age: 77
End: 2017-11-07

## 2017-11-07 VITALS
SYSTOLIC BLOOD PRESSURE: 129 MMHG | DIASTOLIC BLOOD PRESSURE: 81 MMHG | HEART RATE: 78 BPM | RESPIRATION RATE: 19 BRPM | OXYGEN SATURATION: 99 % | TEMPERATURE: 98 F

## 2017-11-07 LAB
GLUCOSE BLDC GLUCOMTR-MCNC: 92 MG/DL — SIGNIFICANT CHANGE UP (ref 70–99)
GLUCOSE BLDC GLUCOMTR-MCNC: 96 MG/DL — SIGNIFICANT CHANGE UP (ref 70–99)

## 2017-11-07 PROCEDURE — 93306 TTE W/DOPPLER COMPLETE: CPT | Mod: 26

## 2017-11-07 PROCEDURE — 99233 SBSQ HOSP IP/OBS HIGH 50: CPT

## 2017-11-07 RX ORDER — ENOXAPARIN SODIUM 100 MG/ML
40 INJECTION SUBCUTANEOUS DAILY
Qty: 0 | Refills: 0 | Status: DISCONTINUED | OUTPATIENT
Start: 2017-11-07 | End: 2017-11-07

## 2017-11-07 RX ORDER — NYSTATIN CREAM 100000 [USP'U]/G
1 CREAM TOPICAL THREE TIMES A DAY
Qty: 0 | Refills: 0 | Status: DISCONTINUED | OUTPATIENT
Start: 2017-11-07 | End: 2017-11-07

## 2017-11-07 RX ORDER — ASPIRIN/CALCIUM CARB/MAGNESIUM 324 MG
1 TABLET ORAL
Qty: 30 | Refills: 0
Start: 2017-11-07 | End: 2017-12-07

## 2017-11-07 RX ORDER — ATORVASTATIN CALCIUM 80 MG/1
1 TABLET, FILM COATED ORAL
Qty: 30 | Refills: 2
Start: 2017-11-07 | End: 2018-02-04

## 2017-11-07 RX ORDER — INSULIN LISPRO 100/ML
VIAL (ML) SUBCUTANEOUS AT BEDTIME
Qty: 0 | Refills: 0 | Status: DISCONTINUED | OUTPATIENT
Start: 2017-11-07 | End: 2017-11-07

## 2017-11-07 RX ORDER — ASPIRIN/CALCIUM CARB/MAGNESIUM 324 MG
81 TABLET ORAL DAILY
Qty: 0 | Refills: 0 | Status: DISCONTINUED | OUTPATIENT
Start: 2017-11-07 | End: 2017-11-07

## 2017-11-07 RX ADMIN — Medication 81 MILLIGRAM(S): at 05:25

## 2017-11-07 RX ADMIN — NYSTATIN CREAM 1 APPLICATION(S): 100000 CREAM TOPICAL at 12:34

## 2017-11-07 RX ADMIN — DULOXETINE HYDROCHLORIDE 60 MILLIGRAM(S): 30 CAPSULE, DELAYED RELEASE ORAL at 12:33

## 2017-11-07 RX ADMIN — ENOXAPARIN SODIUM 40 MILLIGRAM(S): 100 INJECTION SUBCUTANEOUS at 05:26

## 2017-11-07 RX ADMIN — ENOXAPARIN SODIUM 40 MILLIGRAM(S): 100 INJECTION SUBCUTANEOUS at 12:33

## 2017-11-07 RX ADMIN — Medication 81 MILLIGRAM(S): at 12:33

## 2017-11-07 RX ADMIN — Medication 112 MICROGRAM(S): at 05:26

## 2017-11-07 NOTE — DISCHARGE NOTE ADULT - HOSPITAL COURSE
outpatient PT The patient is a 78 yo f, with PMHx of RA, and hypothyroidism who p/w acute onset of confusion, double vision, vertigo, and visual hallucinations. Last well known time was at 13:10, when she spoke with her daughter and suppose to go after her. She can't describe her exact symptoms and time of onset after that. She notices double vision, seeing small cars on the grass, seeing small people. She manages to find the daughter who needed to take her out fo the car, she states that she was imbalance and needed help to move around and was also confused. By the time in the Ed the patient was quickly going back to her baseline.     The patient continued to remain at her baseline neurologically and her MRI was negative for an MRI. Vessel imaging also didn't reveal any related large vessel occlusion.   TTE revealed ____.     Patient was evaluated by PT and it was determined she was best suited for outpatient PT. The patient is a 78 yo f, with PMHx of RA, and hypothyroidism who p/w acute onset of confusion, double vision, vertigo, and visual hallucinations. Last well known time was at 13:10, when she spoke with her daughter and suppose to go after her. She can't describe her exact symptoms and time of onset after that. She notices double vision, seeing small cars on the grass, seeing small people. She manages to find the daughter who needed to take her out fo the car, she states that she was imbalance and needed help to move around and was also confused. By the time in the Ed the patient was quickly going back to her baseline.     The patient continued to remain at her baseline neurologically and her MRI was negative for an MRI. Vessel imaging also didn't reveal any related large vessel occlusion.   TTE was performed, results are pending and will be available at follow up.     Patient was evaluated by PT and it was determined she was best suited for outpatient PT.

## 2017-11-07 NOTE — DIETITIAN INITIAL EVALUATION ADULT. - NS FNS WEIGHT CHANGE REASON
intentional/pt reports intentional wt loss over past few months, unsure of how much she has lost, unsure of usual wt

## 2017-11-07 NOTE — DISCHARGE NOTE ADULT - NS AS DC STROKE ED MATERIALS
Stroke Warning Signs and Symptoms/Call 911 for Stroke/Risk Factors for Stroke/Prescribed Medications/Need for Followup After Discharge/Stroke Education Booklet

## 2017-11-07 NOTE — DIETITIAN INITIAL EVALUATION ADULT. - OTHER INFO
Pt seen for: length of stay.   Adm dx:  CVA   GI issues: denies N/V/D/C  Last BM: pt unsure    Food allergies: NKFA    Vit/supplement PTA:  folic acid, biotin

## 2017-11-07 NOTE — DIETITIAN INITIAL EVALUATION ADULT. - ENERGY NEEDS
Ht: 64"   Wt: 205  BMI:35.1  kg/m2   IBW: 120 (+/-10%)    171 % IBW  Edema:  none  Skin: no pressure injuries

## 2017-11-07 NOTE — DISCHARGE NOTE ADULT - CARE PROVIDER_API CALL
Jorge Paredes ([employer]), Neurology; Vascular Neurology  [address] [address]  Phone: [phone]  Fax: [phone]

## 2017-11-07 NOTE — DIETITIAN INITIAL EVALUATION ADULT. - ADHERENCE
tries to watch carbohydrate intake. Diet hx: Breakfast will have cereal or yogurt, occasionally eggs, Lunch has salad w/ chicken or tuna, Dinner has a protein, vegetable, salad. Checks BG at home, usually around 93.

## 2017-11-07 NOTE — PROGRESS NOTE ADULT - ASSESSMENT
ASSESSMENT: 77F with T2DM, RA, who presents with acute confusional sta5te, with decrease short term memory, formed visual hallucinations, and ataxia. CT head was negative. She was treated with IV tPA and has improved since. Impression: Cerebral embolism without infarction possible thalamic or brainstem of undetermined etiology.     NEURO: neurologically appears back to independent functional baseline, continue close monitoring for neurologic deterioration, permissive HTN with titration to normotension, titrate statin to LDL goal less than 70, MR imaging as noted above. Physical therapy/OT eval for home with outpatient therapy.     ANTITHROMBOTIC THERAPY: ASA     PULMONARY: CXR clear, protecting airway, saturating well     CARDIOVASCULAR: check TTE, cardiac monitoring no events                              SBP goal: normotension    GASTROINTESTINAL:  dysphagia screen passed, on regular diet and tolerating well        Diet: regular     RENAL: BUN/Cr without acute change, maintain adequate hydration, good urine output      Na Goal: Greater than 135     Sousa: n     HEMATOLOGY: no active bleeding     DVT ppx: Heparin s.c [] LMWH [x]     ID: afebrile, no si/sx of infection     OTHER: current medical condition and plan of care d/w patient at bedside in extent.  Will d/w her sister Anushka as well per request.     DISPOSITION: Home with outpatient therapy as workup complete.       CORE MEASURES:        Admission NIHSS: 3     TPA: [x] YES [] NO      LDL/HDL: 122/55     Depression Screen: 0     Statin Therapy: y      Dysphagia Screen: [x] PASS [] FAIL     Smoking [] YES [x] NO      Afib [] YES [x] NO     Stroke Education [x] YES [] NO ASSESSMENT: 77F with T2DM, RA, who presents with acute confusional sta5te, with decrease short term memory, formed visual hallucinations, and ataxia. MRI brain negative. She was treated with IV tPA and has improved since. Impression: Cerebral embolism without infarction aborted post IV tPA..     NEURO: neurologically appears back to independent functional baseline, continue close monitoring for neurologic deterioration, permissive HTN with titration to normotension, titrate statin to LDL goal less than 70, MR imaging as noted above. Physical therapy/OT eval for home with outpatient therapy.     ANTITHROMBOTIC THERAPY: ASA     PULMONARY: CXR clear, protecting airway, saturating well     CARDIOVASCULAR: check TTE, cardiac monitoring no events                              SBP goal: normotension    GASTROINTESTINAL:  dysphagia screen passed, on regular diet and tolerating well        Diet: regular     RENAL: BUN/Cr without acute change, maintain adequate hydration, good urine output      Na Goal: Greater than 135     Sousa: n     HEMATOLOGY: no active bleeding     DVT ppx: Heparin s.c [] LMWH [x]     ID: afebrile, no si/sx of infection     OTHER: current medical condition and plan of care d/w patient at bedside in extent.  Will d/w her sister Anushka as well per request.     DISPOSITION: Home with outpatient therapy as workup complete.       CORE MEASURES:        Admission NIHSS: 3     TPA: [x] YES [] NO      LDL/HDL: 122/55     Depression Screen: 0     Statin Therapy: y      Dysphagia Screen: [x] PASS [] FAIL     Smoking [] YES [x] NO      Afib [] YES [x] NO     Stroke Education [x] YES [] NO

## 2017-11-07 NOTE — PROGRESS NOTE ADULT - SUBJECTIVE AND OBJECTIVE BOX
THE PATIENT WAS SEEN AND EXAMINED BY ME WITH THE HOUSESTAFF AND STROKE TEAM DURING MORNING ROUNDS.   HPI:  The patient is a 76 yo f, with PMHx of RA, and hypothyroidism recent travel to [facility] who p/w acute onset of confusion, double vision, vertigo, and visual hallucinations. Last well known time was at 13:10 11/5, when she spoke with her daughter and suppose to go after her. She can't describe her exact symptoms and time of onset after that. She notices double vision, seeing small cars on the grass, seeing small people. She manages to find the daughter who needed to take her out fo the car, she states that she was imbalance and needed help to move around and was also confused. Received TPA.    SUBJECTIVE: No events overnight.  No new neurologic complaints.      aspirin enteric coated 81 milliGRAM(s) Oral daily  atorvastatin 80 milliGRAM(s) Oral at bedtime  dextrose 5%. 1000 milliLiter(s) IV Continuous <Continuous>  dextrose 50% Injectable 12.5 Gram(s) IV Push once  dextrose 50% Injectable 25 Gram(s) IV Push once  dextrose 50% Injectable 25 Gram(s) IV Push once  dextrose Gel 1 Dose(s) Oral once PRN  DULoxetine 60 milliGRAM(s) Oral daily  enoxaparin Injectable 40 milliGRAM(s) SubCutaneous daily  glucagon  Injectable 1 milliGRAM(s) IntraMuscular once PRN  insulin lispro (HumaLOG) corrective regimen sliding scale   SubCutaneous at bedtime  insulin lispro (HumaLOG) corrective regimen sliding scale   SubCutaneous three times a day before meals  levothyroxine 112 MICROGram(s) Oral daily  methotrexate (Non - oncologic) 12.5 milliGRAM(s) Oral every week  nystatin Powder 1 Application(s) Topical three times a day      PHYSICAL EXAM:   Vital Signs Last 24 Hrs  T(C): 36.4 (07 Nov 2017 07:37), Max: 36.9 (06 Nov 2017 20:00)  T(F): 97.6 (07 Nov 2017 07:37), Max: 98.4 (06 Nov 2017 20:00)  HR: 90 (07 Nov 2017 10:35) (65 - 90)  BP: 136/71 (07 Nov 2017 10:35) (124/95 - 163/82)  BP(mean): 89 (07 Nov 2017 10:35) (84 - 111)  RR: 27 (07 Nov 2017 10:35) (14 - 27)  SpO2: 98% (07 Nov 2017 10:35) (93% - 98%)    General: No acute distress  HEENT: EOM intact, visual fields full  Abdomen: Soft, nontender, nondistended   Extremities: No edema    NEUROLOGICAL EXAM:  Mental status: Awake, alert, oriented x3, no aphasia, no neglect, normal memory   Cranial Nerves: No facial asymmetry, no nystagmus, no dysarthria,  tongue midline  Motor exam: Normal tone, no drift, 5/5 RUE, 5/5 RLE, 5/5 LUE, 5/5 LLE, normal fine finger movements.  Sensation: Intact to light touch   Coordination/ Gait: No dysmetria     LABS:                        12.9   8.39  )-----------( 306      ( 06 Nov 2017 07:09 )             39.4    11-06    141  |  106  |  17  ----------------------------<  93  4.0   |  22  |  0.75    Ca    9.1      06 Nov 2017 07:16    TPro  6.4  /  Alb  3.4  /  TBili  0.3  /  DBili  x   /  AST  24  /  ALT  19  /  AlkPhos  124<H>  11-06  PT/INR - ( 05 Nov 2017 15:54 )   PT: 11.3 sec;   INR: 1.05 ratio         PTT - ( 05 Nov 2017 15:54 )  PTT:30.0 sec  Hemoglobin A1C, Whole Blood: 5.4 % (11-06 @ 07:09)      IMAGING: Reviewed by me.   MRI/MRA  Angio Head/Neck  No Cont (11.06.17)   Age-appropriate involutional andischemic gliotic changes. No   acute infarcts, hemorrhage or mass. No abnormal enhancement. Normal MRA   of the head and neck. No significant change since 11/5/2017.

## 2017-11-07 NOTE — DISCHARGE NOTE ADULT - PATIENT PORTAL LINK FT
“You can access the [facility] Patient Portal, offered by [facility], by registering with the following website: [email or web address]”

## 2017-11-07 NOTE — DISCHARGE NOTE ADULT - CARE PLAN
Principal Discharge DX:	Cerebrovascular accident (CVA), unspecified mechanism  Goal:	Reduce risk for recurrence  Instructions for follow-up, activity and diet:	Take aspirin and lipitor.  Follow up with vascular neurology in 1-2 weeks.  No driving until follow up with neurology.  Secondary Diagnosis:	Rheumatoid arthritis  Instructions for follow-up, activity and diet:	Take your medications as directed.  Secondary Diagnosis:	Hypothyroid  Secondary Diagnosis:	Type 2 diabetes mellitus

## 2017-11-07 NOTE — PHYSICAL THERAPY INITIAL EVALUATION ADULT - PERTINENT HX OF CURRENT PROBLEM, REHAB EVAL
p/w acute onset of confusion, double vision, vertigo, & visual hallucinations. Last well known time was at 13:10. symptoms were resolved in ED however, continued to have imbalance (loosing balance to the right), no other findings. tPA discussed and the patient was amenable to it due to importance of ability to ambulate. NIHSS 2, MRS 0

## 2017-11-07 NOTE — DISCHARGE NOTE ADULT - PLAN OF CARE
Reduce risk for recurrence Take aspirin and lipitor.  Follow up with vascular neurology in 1-2 weeks.  No driving until follow up with neurology. Take your medications as directed.

## 2017-11-07 NOTE — DISCHARGE NOTE ADULT - MEDICATION SUMMARY - MEDICATIONS TO TAKE
I will START or STAY ON the medications listed below when I get home from the hospital:    aspirin 81 mg oral delayed release tablet  -- 1 tab(s) by mouth once a day  -- Indication: For CVA    DULoxetine 60 mg oral delayed release capsule  -- 1 cap(s) by mouth once a day  -- Indication: For RA    traZODone 50 mg oral tablet  -- 0.5 tab(s) by mouth once a day (at bedtime)  -- Indication: For sleep    metFORMIN 500 mg oral tablet, extended release  -- 2 tab(s) by mouth once a day (in the evening)  -- Indication: For DMII    atorvastatin 80 mg oral tablet  -- 1 tab(s) by mouth once a day (at bedtime)  -- Indication: For CVA    methotrexate 2.5 mg oral tablet  -- 5 tab(s) by mouth once a week (Sun)  -- Indication: For RA    econazole 1% topical cream  -- Apply on skin to affected area once a day  -- Indication: For RA    Hydrocort 2.5% topical cream  -- Apply on skin to affected area once a day  -- Indication: For RA    Orencia  -- intravenous once a month  -- Indication: For RA    Synthroid 112 mcg (0.112 mg) oral tablet  -- 1 tab(s) by mouth once a day  -- Indication: For hypothyroidism    folic acid  -- 1 tab(s) by mouth once a day  -- Indication: For RA    biotin  -- 1 tab(s) by mouth once a day  -- Indication: For RA I will START or STAY ON the medications listed below when I get home from the hospital:    Outpatient Physical Therapy   -- patient to continue w/ physical therapy for stroke   -- Indication: For Cerebral infarction    aspirin 81 mg oral delayed release tablet  -- 1 tab(s) by mouth once a day  -- Indication: For CVA    DULoxetine 60 mg oral delayed release capsule  -- 1 cap(s) by mouth once a day  -- Indication: For RA    traZODone 50 mg oral tablet  -- 0.5 tab(s) by mouth once a day (at bedtime)  -- Indication: For sleep    metFORMIN 500 mg oral tablet, extended release  -- 2 tab(s) by mouth once a day (in the evening)  -- Indication: For DMII    atorvastatin 80 mg oral tablet  -- 1 tab(s) by mouth once a day (at bedtime)  -- Indication: For CVA    methotrexate 2.5 mg oral tablet  -- 5 tab(s) by mouth once a week (Sun)  -- Indication: For RA    econazole 1% topical cream  -- Apply on skin to affected area once a day  -- Indication: For RA    Hydrocort 2.5% topical cream  -- Apply on skin to affected area once a day  -- Indication: For RA    Orencia  -- intravenous once a month  -- Indication: For RA    Synthroid 112 mcg (0.112 mg) oral tablet  -- 1 tab(s) by mouth once a day  -- Indication: For hypothyroidism    folic acid  -- 1 tab(s) by mouth once a day  -- Indication: For RA    biotin  -- 1 tab(s) by mouth once a day  -- Indication: For RA

## 2017-11-09 ENCOUNTER — APPOINTMENT (OUTPATIENT)
Dept: NEUROLOGY | Facility: CLINIC | Age: 77
End: 2017-11-09
Payer: MEDICARE

## 2017-11-09 VITALS
SYSTOLIC BLOOD PRESSURE: 125 MMHG | BODY MASS INDEX: 29.02 KG/M2 | HEIGHT: 64 IN | WEIGHT: 170 LBS | DIASTOLIC BLOOD PRESSURE: 81 MMHG | HEART RATE: 96 BPM

## 2017-11-09 DIAGNOSIS — H04.129 DRY EYE SYNDROME OF UNSPECIFIED LACRIMAL GLAND: ICD-10-CM

## 2017-11-09 DIAGNOSIS — Z78.9 OTHER SPECIFIED HEALTH STATUS: ICD-10-CM

## 2017-11-09 DIAGNOSIS — Z83.3 FAMILY HISTORY OF DIABETES MELLITUS: ICD-10-CM

## 2017-11-09 DIAGNOSIS — I66.9 OCCLUSION AND STENOSIS OF UNSPECIFIED CEREBRAL ARTERY: ICD-10-CM

## 2017-11-09 DIAGNOSIS — Z87.891 PERSONAL HISTORY OF NICOTINE DEPENDENCE: ICD-10-CM

## 2017-11-09 PROCEDURE — 99215 OFFICE O/P EST HI 40 MIN: CPT

## 2017-11-19 ENCOUNTER — TRANSCRIPTION ENCOUNTER (OUTPATIENT)
Age: 77
End: 2017-11-19

## 2017-11-24 RX ORDER — ATORVASTATIN CALCIUM 80 MG/1
80 TABLET, FILM COATED ORAL
Qty: 30 | Refills: 0 | Status: DISCONTINUED | COMMUNITY
Start: 2017-11-07 | End: 2017-11-24

## 2017-12-04 ENCOUNTER — LABORATORY RESULT (OUTPATIENT)
Age: 77
End: 2017-12-04

## 2017-12-04 ENCOUNTER — APPOINTMENT (OUTPATIENT)
Dept: RHEUMATOLOGY | Facility: CLINIC | Age: 77
End: 2017-12-04
Payer: MEDICARE

## 2017-12-04 PROCEDURE — 36415 COLL VENOUS BLD VENIPUNCTURE: CPT

## 2017-12-04 PROCEDURE — 96413 CHEMO IV INFUSION 1 HR: CPT

## 2017-12-13 ENCOUNTER — APPOINTMENT (OUTPATIENT)
Dept: NEUROLOGY | Facility: CLINIC | Age: 77
End: 2017-12-13
Payer: MEDICARE

## 2017-12-13 PROCEDURE — 95819 EEG AWAKE AND ASLEEP: CPT

## 2017-12-14 PROCEDURE — 95953: CPT

## 2017-12-15 PROCEDURE — 95953: CPT

## 2017-12-16 PROCEDURE — 95953: CPT

## 2017-12-19 PROCEDURE — 99291 CRITICAL CARE FIRST HOUR: CPT | Mod: 25

## 2017-12-19 PROCEDURE — 85730 THROMBOPLASTIN TIME PARTIAL: CPT

## 2017-12-19 PROCEDURE — 85610 PROTHROMBIN TIME: CPT

## 2017-12-19 PROCEDURE — 70544 MR ANGIOGRAPHY HEAD W/O DYE: CPT

## 2017-12-19 PROCEDURE — 80053 COMPREHEN METABOLIC PANEL: CPT

## 2017-12-19 PROCEDURE — 70498 CT ANGIOGRAPHY NECK: CPT

## 2017-12-19 PROCEDURE — 70496 CT ANGIOGRAPHY HEAD: CPT

## 2017-12-19 PROCEDURE — 93005 ELECTROCARDIOGRAM TRACING: CPT

## 2017-12-19 PROCEDURE — 83036 HEMOGLOBIN GLYCOSYLATED A1C: CPT

## 2017-12-19 PROCEDURE — 70549 MR ANGIOGRAPH NECK W/O&W/DYE: CPT

## 2017-12-19 PROCEDURE — 84484 ASSAY OF TROPONIN QUANT: CPT

## 2017-12-19 PROCEDURE — 93306 TTE W/DOPPLER COMPLETE: CPT

## 2017-12-19 PROCEDURE — 85027 COMPLETE CBC AUTOMATED: CPT

## 2017-12-19 PROCEDURE — 80061 LIPID PANEL: CPT

## 2017-12-19 PROCEDURE — 82553 CREATINE MB FRACTION: CPT

## 2017-12-19 PROCEDURE — 97161 PT EVAL LOW COMPLEX 20 MIN: CPT

## 2017-12-19 PROCEDURE — 97165 OT EVAL LOW COMPLEX 30 MIN: CPT

## 2017-12-19 PROCEDURE — 82962 GLUCOSE BLOOD TEST: CPT

## 2017-12-19 PROCEDURE — 70551 MRI BRAIN STEM W/O DYE: CPT

## 2017-12-19 PROCEDURE — 71045 X-RAY EXAM CHEST 1 VIEW: CPT

## 2017-12-19 PROCEDURE — A9585: CPT

## 2017-12-19 PROCEDURE — 70450 CT HEAD/BRAIN W/O DYE: CPT

## 2017-12-30 ENCOUNTER — EMERGENCY (EMERGENCY)
Facility: HOSPITAL | Age: 77
LOS: 1 days | Discharge: HOME | End: 2017-12-30
Attending: EMERGENCY MEDICINE | Admitting: EMERGENCY MEDICINE
Payer: MEDICARE

## 2017-12-30 VITALS
RESPIRATION RATE: 16 BRPM | SYSTOLIC BLOOD PRESSURE: 120 MMHG | DIASTOLIC BLOOD PRESSURE: 76 MMHG | TEMPERATURE: 98 F | OXYGEN SATURATION: 98 % | HEART RATE: 92 BPM

## 2017-12-30 VITALS
DIASTOLIC BLOOD PRESSURE: 84 MMHG | RESPIRATION RATE: 18 BRPM | WEIGHT: 179.9 LBS | TEMPERATURE: 98 F | HEART RATE: 96 BPM | SYSTOLIC BLOOD PRESSURE: 145 MMHG | OXYGEN SATURATION: 98 %

## 2017-12-30 DIAGNOSIS — H26.9 UNSPECIFIED CATARACT: Chronic | ICD-10-CM

## 2017-12-30 DIAGNOSIS — Z98.89 OTHER SPECIFIED POSTPROCEDURAL STATES: Chronic | ICD-10-CM

## 2017-12-30 PROCEDURE — 99284 EMERGENCY DEPT VISIT MOD MDM: CPT

## 2017-12-30 PROCEDURE — 99283 EMERGENCY DEPT VISIT LOW MDM: CPT | Mod: GC

## 2017-12-30 NOTE — ED ADULT NURSE NOTE - CHIEF COMPLAINT QUOTE
"My doctor said there's something wrong with my carotid artery because I can't open my mouth" - denies CP/SOB/HA/N/V. Pt is speaking coherently, no resp distress noted.

## 2017-12-30 NOTE — ED PROVIDER NOTE - CARE PLAN
Principal Discharge DX:	Throat discomfort  Instructions for follow-up, activity and diet:	Follow up with your primary doctor within the next 1-2 days  If you have any worsening or changing symptoms such as confusion, loss of consciousness, worsening pain, nausea/vomiting, or if you have any other concerns please return to the emergency department

## 2017-12-30 NOTE — ED PROVIDER NOTE - PROGRESS NOTE DETAILS
Carlos: Discussed with Dr. 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 He doesn't know anymore.   Patient's exam unremarkable, speech clear, gait normal, no neuro findings, no findings of riya syndrome, throat clear, pt can open mouth a normal distance, no signs of infection, afebrile, no recent trauma or whiplash, no abrasions, no signs of sepsis, normal ROM neck. Discharging patient with return precautions. Patient was evaluated by me, found in no acute distress, calm, speaking in complete sentences. Physical exam is unremarkable, with patient having no difficulty opening/closing mouth. Pharynx and airway are unremarkable. Patient with non-toxic appearance, no signs of Jacy's syndrome, and unremakable appearing neck. Recommendations for rest and follow-up with PMD in 24-48 hours were given to patient. Patient is stable for discharge. Will discharge home. Dr. RYDER agree with resident assessment and plan. Dr. Mark Mcmanus

## 2017-12-30 NOTE — ED PROVIDER NOTE - MEDICAL DECISION MAKING DETAILS
78yo hx of RA, sjoegren, biliary cirrhosis, DM, hypothyroid, presenting with 1wk of chills, sore throat, and inability to "open mouth as wide as normal". Went to urgent care yesterday. Was told she had a "carotid artery infection or blockage" and was started on clindamycin. Exam unremarkable. Unclear what she was sent in for. Will try to get a hold of Dr. Jorge Paredes to learn more. 76yo hx of RA, sjoegren, biliary cirrhosis, DM, hypothyroid, presenting with 1wk of chills, sore throat, and inability to "open mouth as wide as normal". Went to urgent care yesterday. Was told she had a "carotid artery infection or blockage" and was started on clindamycin. Exam unremarkable. Unclear what she was sent in for. Will try to get a hold of Dr. Jorge Paredes to learn more.  Dr. Mcmanus: Female patient with complaints of throat discomfort, told at urgent care she had a carotid artery infection. Patient found in no acute distress, calm, speaking in complete sentences. Physical exam is unremarkable, with patient having no difficulty opening/closing mouth. Pharynx and airway are unremarkable. Patient with non-toxic appearance, no signs of Jacy's syndrome, and unremarkable appearing neck. Recommendations for rest and follow-up with PMD in 24-48 hours were given to patient.

## 2017-12-30 NOTE — ED ADULT NURSE NOTE - OBJECTIVE STATEMENT
76 y/o F pt present to ED for sore throat, cough, fever, chills, pt has been having sore cough, fever and chills times 4 days along with difficult opening mouth, pt went to urgent care yesterday and was told she had infection of carotid artery, was discharge with PO antibiotic, spoke to neurologist who told her to come to ED, on exam pt A&OX3, breathing spontaneous, unlabored without distress on room air, left side throat pain, throat NT, - swelling, - redness, no difficult swallowing, pt afebrile, denies dizziness, SOB, chest pain, dysuria, hematuria, no vision changes

## 2017-12-30 NOTE — ED PROVIDER NOTE - OBJECTIVE STATEMENT
76yo hx of RA, sjoegren, biliary cirrhosis, DM, hypothyroid, presenting with 1wk of chills, sore throat, and inability to "open mouth as wide as normal". Went to urgent care yesterday. No labs or imaging performed. Was told she had a "carotid artery infection or blockage" and was started on clindamycin. Called her PMD/Neurologist Dr. Jorge Paredes who told her to come to ED. Notes she is on a heart monitor for 30 days since she had a mini stroke earlier in Nov.

## 2017-12-30 NOTE — ED PROVIDER NOTE - PHYSICAL EXAMINATION
Gen: No acute distress, alert, cooperative  Head: Normocephalic, Atraumatic  HEENT: PERRL, oral mucosa moist, no exudate, erythema or swelling in oral cavity, normal conjunctiva. Facial bones and sinus non-tender other than mild tenderness over cheeks.   Lung: CTAB, no respiratory distress, no crackles or wheezes  CV: rrr, no murmur  Abd: soft, NTND  MSK: No LE edema, no visible deformities, walking into ED  Neuro: No focal neurologic deficits, normal strength and sensation throughout  Skin: No rash  Psych: normal affect, follows commands

## 2017-12-30 NOTE — ED ADULT TRIAGE NOTE - CHIEF COMPLAINT QUOTE
"My doctor said there's something wrong with my carotid artery because I can't open my mouth" - denies CP/SOB/HA/N/V. Pt is speaking coherently, no resp distress noted. "My doctor said there's something wrong with my carotid artery because I can't open my mouth" - denies CP/SOB/HA/N/V. Pt is speaking coherently, no resp distress noted, no diagnostics done

## 2017-12-30 NOTE — ED PROVIDER NOTE - NS ED ROS FT
ROS: no eye pain, no vision changes. no CP. no SOB, no n/v/d/c, no abd pain. no rash. no bleeding. no urinary complaints. no weakness, no extremity swelling. no HA. no neck/back pain.

## 2017-12-30 NOTE — ED PROVIDER NOTE - PLAN OF CARE
Follow up with your primary doctor within the next 1-2 days  If you have any worsening or changing symptoms such as confusion, loss of consciousness, worsening pain, nausea/vomiting, or if you have any other concerns please return to the emergency department

## 2018-01-02 ENCOUNTER — APPOINTMENT (OUTPATIENT)
Dept: NEUROLOGY | Facility: CLINIC | Age: 78
End: 2018-01-02
Payer: MEDICARE

## 2018-01-02 VITALS
WEIGHT: 170 LBS | BODY MASS INDEX: 29.02 KG/M2 | DIASTOLIC BLOOD PRESSURE: 80 MMHG | SYSTOLIC BLOOD PRESSURE: 122 MMHG | HEIGHT: 64 IN | HEART RATE: 97 BPM

## 2018-01-02 DIAGNOSIS — I63.02 CEREBRAL INFARCTION DUE TO THROMBOSIS OF BASILAR ARTERY: ICD-10-CM

## 2018-01-02 PROCEDURE — 99215 OFFICE O/P EST HI 40 MIN: CPT

## 2018-01-03 ENCOUNTER — APPOINTMENT (OUTPATIENT)
Dept: RHEUMATOLOGY | Facility: CLINIC | Age: 78
End: 2018-01-03
Payer: MEDICARE

## 2018-01-09 ENCOUNTER — APPOINTMENT (OUTPATIENT)
Dept: RHEUMATOLOGY | Facility: CLINIC | Age: 78
End: 2018-01-09
Payer: MEDICARE

## 2018-01-09 ENCOUNTER — LABORATORY RESULT (OUTPATIENT)
Age: 78
End: 2018-01-09

## 2018-01-09 PROCEDURE — 36415 COLL VENOUS BLD VENIPUNCTURE: CPT

## 2018-01-09 PROCEDURE — 96413 CHEMO IV INFUSION 1 HR: CPT

## 2018-01-29 ENCOUNTER — APPOINTMENT (OUTPATIENT)
Dept: RHEUMATOLOGY | Facility: CLINIC | Age: 78
End: 2018-01-29

## 2018-02-04 ENCOUNTER — LABORATORY RESULT (OUTPATIENT)
Age: 78
End: 2018-02-04

## 2018-02-05 ENCOUNTER — APPOINTMENT (OUTPATIENT)
Dept: RHEUMATOLOGY | Facility: CLINIC | Age: 78
End: 2018-02-05
Payer: MEDICARE

## 2018-02-05 PROCEDURE — 36415 COLL VENOUS BLD VENIPUNCTURE: CPT

## 2018-02-05 PROCEDURE — 96413 CHEMO IV INFUSION 1 HR: CPT

## 2018-02-26 ENCOUNTER — APPOINTMENT (OUTPATIENT)
Dept: RHEUMATOLOGY | Facility: CLINIC | Age: 78
End: 2018-02-26

## 2018-03-06 ENCOUNTER — LABORATORY RESULT (OUTPATIENT)
Age: 78
End: 2018-03-06

## 2018-03-06 ENCOUNTER — APPOINTMENT (OUTPATIENT)
Dept: RHEUMATOLOGY | Facility: CLINIC | Age: 78
End: 2018-03-06
Payer: MEDICARE

## 2018-03-06 PROCEDURE — 36415 COLL VENOUS BLD VENIPUNCTURE: CPT

## 2018-03-06 PROCEDURE — 96413 CHEMO IV INFUSION 1 HR: CPT

## 2018-03-26 ENCOUNTER — APPOINTMENT (OUTPATIENT)
Dept: RHEUMATOLOGY | Facility: CLINIC | Age: 78
End: 2018-03-26

## 2018-04-04 ENCOUNTER — LABORATORY RESULT (OUTPATIENT)
Age: 78
End: 2018-04-04

## 2018-04-04 ENCOUNTER — MESSAGE (OUTPATIENT)
Age: 78
End: 2018-04-04

## 2018-04-04 ENCOUNTER — APPOINTMENT (OUTPATIENT)
Dept: RHEUMATOLOGY | Facility: CLINIC | Age: 78
End: 2018-04-04
Payer: MEDICARE

## 2018-04-04 PROCEDURE — 96413 CHEMO IV INFUSION 1 HR: CPT

## 2018-04-04 PROCEDURE — 36415 COLL VENOUS BLD VENIPUNCTURE: CPT

## 2018-04-23 ENCOUNTER — APPOINTMENT (OUTPATIENT)
Dept: RHEUMATOLOGY | Facility: CLINIC | Age: 78
End: 2018-04-23

## 2018-04-30 ENCOUNTER — LABORATORY RESULT (OUTPATIENT)
Age: 78
End: 2018-04-30

## 2018-04-30 ENCOUNTER — APPOINTMENT (OUTPATIENT)
Dept: RHEUMATOLOGY | Facility: CLINIC | Age: 78
End: 2018-04-30
Payer: MEDICARE

## 2018-04-30 PROCEDURE — 96413 CHEMO IV INFUSION 1 HR: CPT

## 2018-04-30 PROCEDURE — 36415 COLL VENOUS BLD VENIPUNCTURE: CPT

## 2018-05-14 ENCOUNTER — APPOINTMENT (OUTPATIENT)
Dept: RHEUMATOLOGY | Facility: CLINIC | Age: 78
End: 2018-05-14
Payer: MEDICARE

## 2018-05-14 VITALS
BODY MASS INDEX: 31.07 KG/M2 | HEART RATE: 90 BPM | TEMPERATURE: 98.1 F | OXYGEN SATURATION: 99 % | DIASTOLIC BLOOD PRESSURE: 83 MMHG | HEIGHT: 64 IN | WEIGHT: 182 LBS | SYSTOLIC BLOOD PRESSURE: 145 MMHG

## 2018-05-14 DIAGNOSIS — R07.89 OTHER CHEST PAIN: ICD-10-CM

## 2018-05-14 DIAGNOSIS — Z87.891 PERSONAL HISTORY OF NICOTINE DEPENDENCE: ICD-10-CM

## 2018-05-14 PROCEDURE — 99214 OFFICE O/P EST MOD 30 MIN: CPT

## 2018-05-17 ENCOUNTER — FORM ENCOUNTER (OUTPATIENT)
Age: 78
End: 2018-05-17

## 2018-05-18 ENCOUNTER — OUTPATIENT (OUTPATIENT)
Dept: OUTPATIENT SERVICES | Facility: HOSPITAL | Age: 78
LOS: 1 days | End: 2018-05-18
Payer: MEDICARE

## 2018-05-18 ENCOUNTER — APPOINTMENT (OUTPATIENT)
Dept: RADIOLOGY | Facility: CLINIC | Age: 78
End: 2018-05-18
Payer: MEDICARE

## 2018-05-18 ENCOUNTER — APPOINTMENT (OUTPATIENT)
Dept: CT IMAGING | Facility: CLINIC | Age: 78
End: 2018-05-18
Payer: MEDICARE

## 2018-05-18 DIAGNOSIS — Z98.89 OTHER SPECIFIED POSTPROCEDURAL STATES: Chronic | ICD-10-CM

## 2018-05-18 DIAGNOSIS — Z00.8 ENCOUNTER FOR OTHER GENERAL EXAMINATION: ICD-10-CM

## 2018-05-18 DIAGNOSIS — H26.9 UNSPECIFIED CATARACT: Chronic | ICD-10-CM

## 2018-05-18 PROCEDURE — 73030 X-RAY EXAM OF SHOULDER: CPT | Mod: 26,RT

## 2018-05-18 PROCEDURE — 71250 CT THORAX DX C-: CPT

## 2018-05-18 PROCEDURE — 71250 CT THORAX DX C-: CPT | Mod: 26

## 2018-05-18 PROCEDURE — 73030 X-RAY EXAM OF SHOULDER: CPT

## 2018-05-25 ENCOUNTER — RESULT REVIEW (OUTPATIENT)
Age: 78
End: 2018-05-25

## 2018-05-25 ENCOUNTER — APPOINTMENT (OUTPATIENT)
Dept: NEUROLOGY | Facility: CLINIC | Age: 78
End: 2018-05-25

## 2018-05-29 ENCOUNTER — APPOINTMENT (OUTPATIENT)
Dept: RHEUMATOLOGY | Facility: CLINIC | Age: 78
End: 2018-05-29
Payer: MEDICARE

## 2018-05-29 ENCOUNTER — LABORATORY RESULT (OUTPATIENT)
Age: 78
End: 2018-05-29

## 2018-05-29 PROCEDURE — 36415 COLL VENOUS BLD VENIPUNCTURE: CPT

## 2018-05-29 PROCEDURE — 96413 CHEMO IV INFUSION 1 HR: CPT

## 2018-05-30 LAB
CK SERPL-CCNC: 48 U/L
MYOGLOBIN SERPL-MCNC: 33 NG/ML

## 2018-06-05 ENCOUNTER — APPOINTMENT (OUTPATIENT)
Dept: ORTHOPEDIC SURGERY | Facility: CLINIC | Age: 78
End: 2018-06-05
Payer: MEDICARE

## 2018-06-05 VITALS
HEART RATE: 101 BPM | BODY MASS INDEX: 29.02 KG/M2 | DIASTOLIC BLOOD PRESSURE: 79 MMHG | HEIGHT: 64 IN | SYSTOLIC BLOOD PRESSURE: 118 MMHG | WEIGHT: 170 LBS

## 2018-06-05 DIAGNOSIS — Z87.39 PERSONAL HISTORY OF OTHER DISEASES OF THE MUSCULOSKELETAL SYSTEM AND CONNECTIVE TISSUE: ICD-10-CM

## 2018-06-05 PROCEDURE — 72110 X-RAY EXAM L-2 SPINE 4/>VWS: CPT

## 2018-06-05 PROCEDURE — 99204 OFFICE O/P NEW MOD 45 MIN: CPT

## 2018-06-05 RX ORDER — MUPIROCIN 20 MG/G
2 OINTMENT TOPICAL
Qty: 22 | Refills: 0 | Status: DISCONTINUED | COMMUNITY
Start: 2018-01-15

## 2018-06-05 RX ORDER — METFORMIN ER 500 MG 500 MG/1
500 TABLET ORAL
Qty: 180 | Refills: 0 | Status: ACTIVE | COMMUNITY
Start: 2017-07-22

## 2018-06-05 RX ORDER — LEVOTHYROXINE SODIUM 112 UG/1
112 TABLET ORAL
Qty: 90 | Refills: 0 | Status: DISCONTINUED | COMMUNITY
Start: 2017-07-22

## 2018-06-05 RX ORDER — AZELASTINE HYDROCHLORIDE 137 UG/1
0.1 SPRAY, METERED NASAL
Qty: 30 | Refills: 0 | Status: DISCONTINUED | COMMUNITY
Start: 2017-12-11

## 2018-06-05 RX ORDER — PILOCARPINE HYDROCHLORIDE 5 MG/1
5 TABLET, FILM COATED ORAL
Qty: 90 | Refills: 0 | Status: DISCONTINUED | COMMUNITY
Start: 2018-01-15

## 2018-06-05 RX ORDER — CLINDAMYCIN HYDROCHLORIDE 300 MG/1
300 CAPSULE ORAL
Qty: 21 | Refills: 0 | Status: DISCONTINUED | COMMUNITY
Start: 2017-12-29

## 2018-06-05 RX ORDER — AZITHROMYCIN 250 MG/1
250 TABLET, FILM COATED ORAL
Qty: 6 | Refills: 0 | Status: DISCONTINUED | COMMUNITY
Start: 2018-05-30

## 2018-06-05 RX ORDER — ECONAZOLE NITRATE 10 MG/G
1 CREAM TOPICAL
Qty: 85 | Refills: 0 | Status: DISCONTINUED | COMMUNITY
Start: 2018-01-15

## 2018-06-05 RX ORDER — BETAMETHASONE DIPROPIONATE 0.5 MG/G
0.05 OINTMENT, AUGMENTED TOPICAL
Qty: 50 | Refills: 0 | Status: DISCONTINUED | COMMUNITY
Start: 2017-11-29

## 2018-06-05 RX ORDER — GABAPENTIN 100 MG/1
100 CAPSULE ORAL
Qty: 90 | Refills: 0 | Status: DISCONTINUED | COMMUNITY
Start: 2017-12-08

## 2018-06-05 RX ORDER — AMOXICILLIN 500 MG/1
500 CAPSULE ORAL
Qty: 20 | Refills: 0 | Status: DISCONTINUED | COMMUNITY
Start: 2018-05-29

## 2018-06-25 ENCOUNTER — LABORATORY RESULT (OUTPATIENT)
Age: 78
End: 2018-06-25

## 2018-06-25 ENCOUNTER — APPOINTMENT (OUTPATIENT)
Dept: RHEUMATOLOGY | Facility: CLINIC | Age: 78
End: 2018-06-25
Payer: MEDICARE

## 2018-06-25 PROCEDURE — 36415 COLL VENOUS BLD VENIPUNCTURE: CPT

## 2018-06-25 PROCEDURE — 96413 CHEMO IV INFUSION 1 HR: CPT

## 2018-07-23 ENCOUNTER — APPOINTMENT (OUTPATIENT)
Dept: RHEUMATOLOGY | Facility: CLINIC | Age: 78
End: 2018-07-23
Payer: MEDICARE

## 2018-07-23 ENCOUNTER — LABORATORY RESULT (OUTPATIENT)
Age: 78
End: 2018-07-23

## 2018-07-23 ENCOUNTER — RX RENEWAL (OUTPATIENT)
Age: 78
End: 2018-07-23

## 2018-07-23 PROCEDURE — 96413 CHEMO IV INFUSION 1 HR: CPT

## 2018-07-23 PROCEDURE — 36415 COLL VENOUS BLD VENIPUNCTURE: CPT

## 2018-08-18 ENCOUNTER — APPOINTMENT (OUTPATIENT)
Dept: MRI IMAGING | Facility: CLINIC | Age: 78
End: 2018-08-18
Payer: MEDICARE

## 2018-08-18 ENCOUNTER — OUTPATIENT (OUTPATIENT)
Dept: OUTPATIENT SERVICES | Facility: HOSPITAL | Age: 78
LOS: 1 days | End: 2018-08-18
Payer: MEDICARE

## 2018-08-18 DIAGNOSIS — H26.9 UNSPECIFIED CATARACT: Chronic | ICD-10-CM

## 2018-08-18 DIAGNOSIS — Z00.8 ENCOUNTER FOR OTHER GENERAL EXAMINATION: ICD-10-CM

## 2018-08-18 DIAGNOSIS — Z98.89 OTHER SPECIFIED POSTPROCEDURAL STATES: Chronic | ICD-10-CM

## 2018-08-18 PROCEDURE — 73221 MRI JOINT UPR EXTREM W/O DYE: CPT | Mod: 26,RT

## 2018-08-18 PROCEDURE — 73221 MRI JOINT UPR EXTREM W/O DYE: CPT

## 2018-08-20 ENCOUNTER — APPOINTMENT (OUTPATIENT)
Dept: RHEUMATOLOGY | Facility: CLINIC | Age: 78
End: 2018-08-20
Payer: MEDICARE

## 2018-08-20 PROCEDURE — 36415 COLL VENOUS BLD VENIPUNCTURE: CPT

## 2018-08-20 PROCEDURE — 96413 CHEMO IV INFUSION 1 HR: CPT

## 2018-09-17 ENCOUNTER — LABORATORY RESULT (OUTPATIENT)
Age: 78
End: 2018-09-17

## 2018-09-17 ENCOUNTER — APPOINTMENT (OUTPATIENT)
Dept: RHEUMATOLOGY | Facility: CLINIC | Age: 78
End: 2018-09-17
Payer: MEDICARE

## 2018-09-17 PROCEDURE — 36415 COLL VENOUS BLD VENIPUNCTURE: CPT

## 2018-09-17 PROCEDURE — 96413 CHEMO IV INFUSION 1 HR: CPT

## 2018-09-17 PROCEDURE — 90686 IIV4 VACC NO PRSV 0.5 ML IM: CPT

## 2018-09-17 PROCEDURE — G0008: CPT

## 2018-10-05 ENCOUNTER — APPOINTMENT (OUTPATIENT)
Dept: RHEUMATOLOGY | Facility: CLINIC | Age: 78
End: 2018-10-05
Payer: MEDICARE

## 2018-10-05 VITALS
HEIGHT: 64 IN | HEART RATE: 72 BPM | BODY MASS INDEX: 29.02 KG/M2 | WEIGHT: 170 LBS | TEMPERATURE: 98 F | DIASTOLIC BLOOD PRESSURE: 87 MMHG | SYSTOLIC BLOOD PRESSURE: 164 MMHG | OXYGEN SATURATION: 98 %

## 2018-10-05 PROCEDURE — 99214 OFFICE O/P EST MOD 30 MIN: CPT

## 2018-10-05 RX ORDER — MELOXICAM 7.5 MG/1
7.5 TABLET ORAL
Qty: 30 | Refills: 1 | Status: DISCONTINUED | COMMUNITY
Start: 2018-05-25 | End: 2018-10-05

## 2018-10-07 RX ORDER — LEVOTHYROXINE SODIUM 0.1 MG/1
100 TABLET ORAL
Qty: 90 | Refills: 0 | Status: COMPLETED | COMMUNITY
Start: 2018-08-06

## 2018-10-07 RX ORDER — ZOLPIDEM TARTRATE 5 MG/1
5 TABLET ORAL
Qty: 30 | Refills: 0 | Status: COMPLETED | COMMUNITY
Start: 2018-07-25

## 2018-10-08 ENCOUNTER — RX RENEWAL (OUTPATIENT)
Age: 78
End: 2018-10-08

## 2018-10-11 ENCOUNTER — FORM ENCOUNTER (OUTPATIENT)
Age: 78
End: 2018-10-11

## 2018-10-12 ENCOUNTER — APPOINTMENT (OUTPATIENT)
Dept: RADIOLOGY | Facility: CLINIC | Age: 78
End: 2018-10-12
Payer: MEDICARE

## 2018-10-12 ENCOUNTER — OUTPATIENT (OUTPATIENT)
Dept: OUTPATIENT SERVICES | Facility: HOSPITAL | Age: 78
LOS: 1 days | End: 2018-10-12
Payer: MEDICARE

## 2018-10-12 DIAGNOSIS — H26.9 UNSPECIFIED CATARACT: Chronic | ICD-10-CM

## 2018-10-12 DIAGNOSIS — Z00.8 ENCOUNTER FOR OTHER GENERAL EXAMINATION: ICD-10-CM

## 2018-10-12 DIAGNOSIS — Z98.89 OTHER SPECIFIED POSTPROCEDURAL STATES: Chronic | ICD-10-CM

## 2018-10-12 PROCEDURE — 73030 X-RAY EXAM OF SHOULDER: CPT | Mod: 26,RT

## 2018-10-12 PROCEDURE — 73630 X-RAY EXAM OF FOOT: CPT | Mod: 26,50

## 2018-10-12 PROCEDURE — 73130 X-RAY EXAM OF HAND: CPT | Mod: 26,50

## 2018-10-12 PROCEDURE — 73630 X-RAY EXAM OF FOOT: CPT

## 2018-10-12 PROCEDURE — 73130 X-RAY EXAM OF HAND: CPT

## 2018-10-12 PROCEDURE — 73030 X-RAY EXAM OF SHOULDER: CPT

## 2018-10-15 ENCOUNTER — LABORATORY RESULT (OUTPATIENT)
Age: 78
End: 2018-10-15

## 2018-10-15 ENCOUNTER — RX RENEWAL (OUTPATIENT)
Age: 78
End: 2018-10-15

## 2018-10-15 ENCOUNTER — APPOINTMENT (OUTPATIENT)
Dept: RHEUMATOLOGY | Facility: CLINIC | Age: 78
End: 2018-10-15
Payer: MEDICARE

## 2018-10-15 PROCEDURE — 36415 COLL VENOUS BLD VENIPUNCTURE: CPT

## 2018-10-15 PROCEDURE — 96413 CHEMO IV INFUSION 1 HR: CPT

## 2018-10-16 ENCOUNTER — RX RENEWAL (OUTPATIENT)
Age: 78
End: 2018-10-16

## 2018-11-05 ENCOUNTER — APPOINTMENT (OUTPATIENT)
Dept: RHEUMATOLOGY | Facility: CLINIC | Age: 78
End: 2018-11-05
Payer: MEDICARE

## 2018-11-05 VITALS
DIASTOLIC BLOOD PRESSURE: 79 MMHG | HEIGHT: 64 IN | TEMPERATURE: 98.7 F | HEART RATE: 83 BPM | OXYGEN SATURATION: 97 % | BODY MASS INDEX: 29.02 KG/M2 | WEIGHT: 170 LBS | SYSTOLIC BLOOD PRESSURE: 123 MMHG

## 2018-11-05 PROCEDURE — 99214 OFFICE O/P EST MOD 30 MIN: CPT

## 2018-11-12 ENCOUNTER — LABORATORY RESULT (OUTPATIENT)
Age: 78
End: 2018-11-12

## 2018-11-12 ENCOUNTER — APPOINTMENT (OUTPATIENT)
Dept: RHEUMATOLOGY | Facility: CLINIC | Age: 78
End: 2018-11-12
Payer: MEDICARE

## 2018-11-12 PROCEDURE — 96413 CHEMO IV INFUSION 1 HR: CPT

## 2018-11-12 PROCEDURE — 36415 COLL VENOUS BLD VENIPUNCTURE: CPT

## 2018-12-10 ENCOUNTER — LABORATORY RESULT (OUTPATIENT)
Age: 78
End: 2018-12-10

## 2018-12-10 ENCOUNTER — APPOINTMENT (OUTPATIENT)
Dept: RHEUMATOLOGY | Facility: CLINIC | Age: 78
End: 2018-12-10
Payer: MEDICARE

## 2018-12-10 PROCEDURE — 36415 COLL VENOUS BLD VENIPUNCTURE: CPT

## 2018-12-10 PROCEDURE — 96413 CHEMO IV INFUSION 1 HR: CPT

## 2019-01-04 ENCOUNTER — APPOINTMENT (OUTPATIENT)
Dept: ORTHOPEDIC SURGERY | Facility: CLINIC | Age: 79
End: 2019-01-04
Payer: MEDICARE

## 2019-01-04 DIAGNOSIS — S33.5XXA SPRAIN OF LIGAMENTS OF LUMBAR SPINE, INITIAL ENCOUNTER: ICD-10-CM

## 2019-01-04 PROCEDURE — 99213 OFFICE O/P EST LOW 20 MIN: CPT

## 2019-01-07 ENCOUNTER — APPOINTMENT (OUTPATIENT)
Dept: RHEUMATOLOGY | Facility: CLINIC | Age: 79
End: 2019-01-07
Payer: MEDICARE

## 2019-01-07 PROCEDURE — 96413 CHEMO IV INFUSION 1 HR: CPT

## 2019-01-07 PROCEDURE — 36415 COLL VENOUS BLD VENIPUNCTURE: CPT

## 2019-01-11 NOTE — PATIENT PROFILE ADULT. - FUNCTIONAL SCREEN CURRENT LEVEL: TOILETING, MLM
Patient complains of TWITCH  in LEFT EYE UPPER  LID   for 3 weeks.  Patient has no other  Symptoms.And  Pupil bigger os  Patient does not wear contacts.     Assessment:  Essential anisocoria (full reaction) NO APD    Plan:  Discussed 25% of population    Assessment:  MYOKYMIA left    Plan:  Discussed with patient the need to decrease stress, caffeine and increase sleep and potassium in diet and Topical allergy drops applied directly to  lid as needed, during  Fasiculation.    RTC complete         (2) assistive person

## 2019-02-04 ENCOUNTER — LABORATORY RESULT (OUTPATIENT)
Age: 79
End: 2019-02-04

## 2019-02-04 ENCOUNTER — APPOINTMENT (OUTPATIENT)
Dept: RHEUMATOLOGY | Facility: CLINIC | Age: 79
End: 2019-02-04
Payer: MEDICARE

## 2019-02-04 PROCEDURE — 36415 COLL VENOUS BLD VENIPUNCTURE: CPT

## 2019-02-04 PROCEDURE — 96413 CHEMO IV INFUSION 1 HR: CPT

## 2019-03-06 ENCOUNTER — LABORATORY RESULT (OUTPATIENT)
Age: 79
End: 2019-03-06

## 2019-03-06 ENCOUNTER — APPOINTMENT (OUTPATIENT)
Dept: RHEUMATOLOGY | Facility: CLINIC | Age: 79
End: 2019-03-06
Payer: MEDICARE

## 2019-03-06 PROCEDURE — 36415 COLL VENOUS BLD VENIPUNCTURE: CPT

## 2019-03-06 PROCEDURE — 96413 CHEMO IV INFUSION 1 HR: CPT

## 2019-03-25 ENCOUNTER — APPOINTMENT (OUTPATIENT)
Dept: OPHTHALMOLOGY | Facility: CLINIC | Age: 79
End: 2019-03-25
Payer: MEDICARE

## 2019-03-25 DIAGNOSIS — H04.123 DRY EYE SYNDROME OF BILATERAL LACRIMAL GLANDS: ICD-10-CM

## 2019-03-25 PROCEDURE — 92014 COMPRE OPH EXAM EST PT 1/>: CPT

## 2019-04-01 ENCOUNTER — APPOINTMENT (OUTPATIENT)
Dept: RHEUMATOLOGY | Facility: CLINIC | Age: 79
End: 2019-04-01
Payer: MEDICARE

## 2019-04-01 ENCOUNTER — LABORATORY RESULT (OUTPATIENT)
Age: 79
End: 2019-04-01

## 2019-04-01 ENCOUNTER — OTHER (OUTPATIENT)
Age: 79
End: 2019-04-01

## 2019-04-01 ENCOUNTER — RX RENEWAL (OUTPATIENT)
Age: 79
End: 2019-04-01

## 2019-04-01 PROCEDURE — 96413 CHEMO IV INFUSION 1 HR: CPT

## 2019-04-01 PROCEDURE — 36415 COLL VENOUS BLD VENIPUNCTURE: CPT

## 2019-04-08 ENCOUNTER — EMERGENCY (EMERGENCY)
Facility: HOSPITAL | Age: 79
LOS: 1 days | Discharge: HOME | End: 2019-04-08
Attending: EMERGENCY MEDICINE
Payer: MEDICARE

## 2019-04-08 VITALS
OXYGEN SATURATION: 98 % | RESPIRATION RATE: 18 BRPM | TEMPERATURE: 99 F | DIASTOLIC BLOOD PRESSURE: 91 MMHG | HEART RATE: 70 BPM | HEIGHT: 64 IN | WEIGHT: 160.06 LBS | SYSTOLIC BLOOD PRESSURE: 181 MMHG

## 2019-04-08 VITALS
TEMPERATURE: 98 F | HEART RATE: 66 BPM | SYSTOLIC BLOOD PRESSURE: 151 MMHG | OXYGEN SATURATION: 100 % | DIASTOLIC BLOOD PRESSURE: 72 MMHG | RESPIRATION RATE: 18 BRPM

## 2019-04-08 DIAGNOSIS — Z98.89 OTHER SPECIFIED POSTPROCEDURAL STATES: Chronic | ICD-10-CM

## 2019-04-08 DIAGNOSIS — H26.9 UNSPECIFIED CATARACT: Chronic | ICD-10-CM

## 2019-04-08 LAB
ALBUMIN SERPL ELPH-MCNC: 4 G/DL — SIGNIFICANT CHANGE UP (ref 3.3–5)
ALP SERPL-CCNC: 107 U/L — SIGNIFICANT CHANGE UP (ref 40–120)
ALT FLD-CCNC: 17 U/L — SIGNIFICANT CHANGE UP (ref 10–45)
ANION GAP SERPL CALC-SCNC: 14 MMOL/L — SIGNIFICANT CHANGE UP (ref 5–17)
APPEARANCE UR: CLEAR — SIGNIFICANT CHANGE UP
AST SERPL-CCNC: 20 U/L — SIGNIFICANT CHANGE UP (ref 10–40)
BACTERIA # UR AUTO: NEGATIVE — SIGNIFICANT CHANGE UP
BASOPHILS # BLD AUTO: 0.1 K/UL — SIGNIFICANT CHANGE UP (ref 0–0.2)
BASOPHILS NFR BLD AUTO: 0.5 % — SIGNIFICANT CHANGE UP (ref 0–2)
BILIRUB SERPL-MCNC: 0.2 MG/DL — SIGNIFICANT CHANGE UP (ref 0.2–1.2)
BILIRUB UR-MCNC: NEGATIVE — SIGNIFICANT CHANGE UP
BUN SERPL-MCNC: 20 MG/DL — SIGNIFICANT CHANGE UP (ref 7–23)
CALCIUM SERPL-MCNC: 9.7 MG/DL — SIGNIFICANT CHANGE UP (ref 8.4–10.5)
CHLORIDE SERPL-SCNC: 106 MMOL/L — SIGNIFICANT CHANGE UP (ref 96–108)
CO2 SERPL-SCNC: 24 MMOL/L — SIGNIFICANT CHANGE UP (ref 22–31)
COLOR SPEC: COLORLESS — SIGNIFICANT CHANGE UP
CREAT SERPL-MCNC: 0.83 MG/DL — SIGNIFICANT CHANGE UP (ref 0.5–1.3)
DIFF PNL FLD: NEGATIVE — SIGNIFICANT CHANGE UP
EOSINOPHIL # BLD AUTO: 0.1 K/UL — SIGNIFICANT CHANGE UP (ref 0–0.5)
EOSINOPHIL NFR BLD AUTO: 1.2 % — SIGNIFICANT CHANGE UP (ref 0–6)
EPI CELLS # UR: 7 /HPF — HIGH
GAS PNL BLDV: SIGNIFICANT CHANGE UP
GLUCOSE SERPL-MCNC: 129 MG/DL — HIGH (ref 70–99)
GLUCOSE UR QL: NEGATIVE — SIGNIFICANT CHANGE UP
HCT VFR BLD CALC: 42.7 % — SIGNIFICANT CHANGE UP (ref 34.5–45)
HGB BLD-MCNC: 14.2 G/DL — SIGNIFICANT CHANGE UP (ref 11.5–15.5)
HYALINE CASTS # UR AUTO: 0 /LPF — SIGNIFICANT CHANGE UP (ref 0–2)
KETONES UR-MCNC: NEGATIVE — SIGNIFICANT CHANGE UP
LEUKOCYTE ESTERASE UR-ACNC: ABNORMAL
LIDOCAIN IGE QN: 22 U/L — SIGNIFICANT CHANGE UP (ref 7–60)
LYMPHOCYTES # BLD AUTO: 2.3 K/UL — SIGNIFICANT CHANGE UP (ref 1–3.3)
LYMPHOCYTES # BLD AUTO: 20.7 % — SIGNIFICANT CHANGE UP (ref 13–44)
MCHC RBC-ENTMCNC: 32 PG — SIGNIFICANT CHANGE UP (ref 27–34)
MCHC RBC-ENTMCNC: 33.3 GM/DL — SIGNIFICANT CHANGE UP (ref 32–36)
MCV RBC AUTO: 96 FL — SIGNIFICANT CHANGE UP (ref 80–100)
MONOCYTES # BLD AUTO: 0.7 K/UL — SIGNIFICANT CHANGE UP (ref 0–0.9)
MONOCYTES NFR BLD AUTO: 6.2 % — SIGNIFICANT CHANGE UP (ref 2–14)
NEUTROPHILS # BLD AUTO: 7.8 K/UL — HIGH (ref 1.8–7.4)
NEUTROPHILS NFR BLD AUTO: 71.4 % — SIGNIFICANT CHANGE UP (ref 43–77)
NITRITE UR-MCNC: NEGATIVE — SIGNIFICANT CHANGE UP
PH UR: 7 — SIGNIFICANT CHANGE UP (ref 5–8)
PLATELET # BLD AUTO: 343 K/UL — SIGNIFICANT CHANGE UP (ref 150–400)
POTASSIUM SERPL-MCNC: 3.9 MMOL/L — SIGNIFICANT CHANGE UP (ref 3.5–5.3)
POTASSIUM SERPL-SCNC: 3.9 MMOL/L — SIGNIFICANT CHANGE UP (ref 3.5–5.3)
PROT SERPL-MCNC: 6.5 G/DL — SIGNIFICANT CHANGE UP (ref 6–8.3)
PROT UR-MCNC: NEGATIVE — SIGNIFICANT CHANGE UP
RBC # BLD: 4.45 M/UL — SIGNIFICANT CHANGE UP (ref 3.8–5.2)
RBC # FLD: 13.7 % — SIGNIFICANT CHANGE UP (ref 10.3–14.5)
RBC CASTS # UR COMP ASSIST: 2 /HPF — SIGNIFICANT CHANGE UP (ref 0–4)
SODIUM SERPL-SCNC: 144 MMOL/L — SIGNIFICANT CHANGE UP (ref 135–145)
SP GR SPEC: 1.04 — HIGH (ref 1.01–1.02)
UROBILINOGEN FLD QL: NEGATIVE — SIGNIFICANT CHANGE UP
WBC # BLD: 11 K/UL — HIGH (ref 3.8–10.5)
WBC # FLD AUTO: 11 K/UL — HIGH (ref 3.8–10.5)
WBC UR QL: 5 /HPF — SIGNIFICANT CHANGE UP (ref 0–5)

## 2019-04-08 PROCEDURE — 99284 EMERGENCY DEPT VISIT MOD MDM: CPT | Mod: 25

## 2019-04-08 PROCEDURE — 87086 URINE CULTURE/COLONY COUNT: CPT

## 2019-04-08 PROCEDURE — 85014 HEMATOCRIT: CPT

## 2019-04-08 PROCEDURE — 74177 CT ABD & PELVIS W/CONTRAST: CPT

## 2019-04-08 PROCEDURE — 71046 X-RAY EXAM CHEST 2 VIEWS: CPT

## 2019-04-08 PROCEDURE — 82435 ASSAY OF BLOOD CHLORIDE: CPT

## 2019-04-08 PROCEDURE — 85027 COMPLETE CBC AUTOMATED: CPT

## 2019-04-08 PROCEDURE — 82803 BLOOD GASES ANY COMBINATION: CPT

## 2019-04-08 PROCEDURE — 74177 CT ABD & PELVIS W/CONTRAST: CPT | Mod: 26

## 2019-04-08 PROCEDURE — 81001 URINALYSIS AUTO W/SCOPE: CPT

## 2019-04-08 PROCEDURE — 83690 ASSAY OF LIPASE: CPT

## 2019-04-08 PROCEDURE — 80053 COMPREHEN METABOLIC PANEL: CPT

## 2019-04-08 PROCEDURE — 83605 ASSAY OF LACTIC ACID: CPT

## 2019-04-08 PROCEDURE — 84295 ASSAY OF SERUM SODIUM: CPT

## 2019-04-08 PROCEDURE — 71046 X-RAY EXAM CHEST 2 VIEWS: CPT | Mod: 26

## 2019-04-08 PROCEDURE — 82330 ASSAY OF CALCIUM: CPT

## 2019-04-08 PROCEDURE — 82947 ASSAY GLUCOSE BLOOD QUANT: CPT

## 2019-04-08 PROCEDURE — 99284 EMERGENCY DEPT VISIT MOD MDM: CPT | Mod: GC

## 2019-04-08 PROCEDURE — 84132 ASSAY OF SERUM POTASSIUM: CPT

## 2019-04-08 RX ORDER — METRONIDAZOLE 500 MG
1 TABLET ORAL
Qty: 21 | Refills: 0
Start: 2019-04-08 | End: 2019-04-14

## 2019-04-08 RX ORDER — ACETAMINOPHEN 500 MG
975 TABLET ORAL ONCE
Qty: 0 | Refills: 0 | Status: COMPLETED | OUTPATIENT
Start: 2019-04-08 | End: 2019-04-08

## 2019-04-08 RX ORDER — CIPROFLOXACIN LACTATE 400MG/40ML
1 VIAL (ML) INTRAVENOUS
Qty: 14 | Refills: 0
Start: 2019-04-08 | End: 2019-04-14

## 2019-04-08 RX ADMIN — Medication 975 MILLIGRAM(S): at 14:07

## 2019-04-08 RX ADMIN — Medication 975 MILLIGRAM(S): at 11:07

## 2019-04-08 NOTE — ED PROVIDER NOTE - OBJECTIVE STATEMENT
80yo F hx RA on methotrexate, dm, biliary cirrhosis, sjogren, hypothyroidism, here with b/l lower abd pain since 5am this morning assc with chills x 1 week. No n/v/d. No abd surgeries. Last BM yesterday and is still passing gas. No sick contacts. No urinary sx or vaginal dischrage

## 2019-04-08 NOTE — ED PROVIDER NOTE - ATTENDING CONTRIBUTION TO CARE
I have seen and evaluated this patient with the resident.   I agree with the findings  unless other wise stated.  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

## 2019-04-08 NOTE — ED ADULT NURSE NOTE - OBJECTIVE STATEMENT
79YOF pmh RA, DM, hypothyroid, biliary cirrhosis presents to ED c/o bilateral lower abd pain starting at 5am this morning. Pt states she also his chills for last week. Denies blood in urine or stool. Denies N/V/D. Denies CP, SOB, fever, HA, dizziness, back pain, burning upon urination. Will continue to [facility] 79YOF pmh RA, DM, hypothyroid, biliary cirrhosis presents to ED c/o bilateral lower abd pain starting at 5am this morning. Pt states she also his chills for last week. Abdomen soft, nondistended, tender on palpation. Denies blood in urine or stool. Denies N/V/D. Denies CP, SOB, fever, HA, dizziness, back pain, burning upon urination. Will continue to monitor. Daughter at bedside.

## 2019-04-08 NOTE — ED PROVIDER NOTE - NSFOLLOWUPINSTRUCTIONS_ED_ALL_ED_FT
- Follow up with your primary doctor in 1 week    - Continue your antibiotics as directed    - Please take ibuprofen/tylenol as directed for pain control    - Advanced diet as tolerated    - Return to the ED for new or worsening symptoms, or inability to tolerate food or liquid

## 2019-04-08 NOTE — ED PROVIDER NOTE - PHYSICAL EXAMINATION
Gen: Well appearing, NAD  Head: NCAT  HEENT: PERRL, MMM, normal conjunctiva, anicteric, neck supple  Lung: CTAB, no adventitious sounds  CV: RRR, no murmurs  Abd: soft, mildly ttp at epigastrum and suprapubic, no rebound or guarding, no CVAT  MSK: No edema, no visible deformities  Neuro: Moving all extremities grossly  Skin: Warm and dry, no evidence of rash  Psych: normal mood and affect

## 2019-04-08 NOTE — ED PROVIDER NOTE - CLINICAL SUMMARY MEDICAL DECISION MAKING FREE TEXT BOX
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

## 2019-04-08 NOTE — ED PROVIDER NOTE - NS ED ROS FT
CONSTITUTIONAL: No fevers, lightheadedness, dizziness  Eyes: no visual changes  Ears: no ear drainage, no ear pain  Nose: no nasal congestion  Mouth/Throat: no sore throat  CV: No chest pain, no palpitations  PULM: No SOB  GI: see hpi  : see hpi  SKIN: no rashes.  NEURO: no headache  PSYCHIATRIC: no known mental health issues.

## 2019-04-09 LAB
CULTURE RESULTS: SIGNIFICANT CHANGE UP
SPECIMEN SOURCE: SIGNIFICANT CHANGE UP

## 2019-04-25 ENCOUNTER — CHART COPY (OUTPATIENT)
Age: 79
End: 2019-04-25

## 2019-04-29 ENCOUNTER — APPOINTMENT (OUTPATIENT)
Dept: RHEUMATOLOGY | Facility: CLINIC | Age: 79
End: 2019-04-29

## 2019-05-01 ENCOUNTER — APPOINTMENT (OUTPATIENT)
Dept: RHEUMATOLOGY | Facility: CLINIC | Age: 79
End: 2019-05-01
Payer: MEDICARE

## 2019-05-01 VITALS
BODY MASS INDEX: 29.88 KG/M2 | TEMPERATURE: 98.2 F | HEART RATE: 69 BPM | SYSTOLIC BLOOD PRESSURE: 144 MMHG | DIASTOLIC BLOOD PRESSURE: 85 MMHG | OXYGEN SATURATION: 98 % | HEIGHT: 64 IN | WEIGHT: 175 LBS

## 2019-05-01 DIAGNOSIS — M19.019 PRIMARY OSTEOARTHRITIS, UNSPECIFIED SHOULDER: ICD-10-CM

## 2019-05-01 PROCEDURE — 20610 DRAIN/INJ JOINT/BURSA W/O US: CPT | Mod: RT

## 2019-05-01 PROCEDURE — 99214 OFFICE O/P EST MOD 30 MIN: CPT | Mod: 25

## 2019-05-01 RX ORDER — METHYLPRED ACET/NACL,ISO-OS/PF 40 MG/ML
40 VIAL (ML) INJECTION
Qty: 1 | Refills: 0 | Status: COMPLETED | OUTPATIENT
Start: 2019-05-01

## 2019-05-01 RX ORDER — LIDOCAINE HYDROCHLORIDE 10 MG/ML
1 INJECTION, SOLUTION INFILTRATION; PERINEURAL
Refills: 0 | Status: COMPLETED | OUTPATIENT
Start: 2019-05-01

## 2019-05-01 RX ORDER — MELOXICAM 7.5 MG/1
7.5 TABLET ORAL DAILY
Qty: 30 | Refills: 1 | Status: DISCONTINUED | COMMUNITY
Start: 2018-10-16 | End: 2019-05-01

## 2019-05-01 RX ORDER — CIPROFLOXACIN 3 MG/ML
0.3 SOLUTION OPHTHALMIC
Qty: 5 | Refills: 0 | Status: COMPLETED | COMMUNITY
Start: 2018-11-09

## 2019-05-01 RX ADMIN — METHYLPREDNISOLONE ACETATE 0 MG/ML: 40 INJECTION, SUSPENSION INTRA-ARTICULAR; INTRALESIONAL; INTRAMUSCULAR; SOFT TISSUE at 00:00

## 2019-05-01 RX ADMIN — LIDOCAINE HYDROCHLORIDE %: 10 INJECTION, SOLUTION INFILTRATION; PERINEURAL at 00:00

## 2019-05-01 NOTE — PHYSICAL EXAM
[General Appearance - Alert] : alert [General Appearance - In No Acute Distress] : in no acute distress [General Appearance - Well Nourished] : well nourished [General Appearance - Well Developed] : well developed [Outer Ear] : the ears and nose were normal in appearance [Sclera] : the sclera and conjunctiva were normal [Nasal Cavity] : the nasal mucosa and septum were normal [Respiration, Rhythm And Depth] : normal respiratory rhythm and effort [Auscultation Breath Sounds / Voice Sounds] : lungs were clear to auscultation bilaterally [Heart Sounds] : normal S1 and S2 [Heart Sounds Gallop] : no gallops [Murmurs] : no murmurs [Edema] : there was no peripheral edema [Bowel Sounds] : normal bowel sounds [Abdomen Soft] : soft [Abdomen Tenderness] : non-tender [Musculoskeletal - Swelling] : no joint swelling seen [No Focal Deficits] : no focal deficits [] : no rash [FreeTextEntry1] : + depressed

## 2019-05-01 NOTE — HISTORY OF PRESENT ILLNESS
[de-identified] : Last seen in November 2018 [FreeTextEntry1] :  Interval history:\par ---------------------\par was off MTX and other meds for 2-3 months because of colitis - now infection/ inflammation resolved \par - Last Orencia infusion > month ago\par - had increased pain " all over " - shoulder and back and knee pain, no pain or swelling of hands, or fingers, no am stiffness\par - crying , feeling sad - lost  in Feb 2019 after prolonged illness\par

## 2019-05-01 NOTE — PROCEDURE
[Today's Date:] : Date: [unfilled] [Risks] : risks [Benefits] : benefits [Alternatives] : alternatives [Consent Obtained] : written consent was obtained prior to the procedure and is detailed in the patient's record [Patient] : Prior to the start of the procedure a time out was taken and the identity of the patient was confirmed via name and date of birth with the patient. The correct site and the procedure to be performed were confirmed. The correct side was confirmed if applicable. The availability of the correct equipment was verified [Therapeutic] : therapeutic [Ethyl Chloride] : ethyl chloride [#1 Site: ______] : #1 site identified in the [unfilled] [___ ml Inj] : [unfilled] ~Uml [1%] : 1%  [Betadine] : betadine solution [21 gauge 1.5 inch] : A 21 gauge 1.5 inch needle was used [Depomedrol ___ mg] : Depomedrol [unfilled] mg [Tolerated Well] : the patient tolerated the procedure well [Reports Improvement in Symptoms] : reports improvement in symptoms [No Complications] : there were no complications [Patient Instructed to Call] : patient was instructed to call if redness at site, a decrease in range of motion or an increase in pain is noted after procedure. [Instructions Given] : handouts/patient instructions were given to patient

## 2019-05-01 NOTE — ASSESSMENT
[FreeTextEntry1] : \par Rheumatoid arthritis \par on immunosuppressive therapy\par Fibromyalgia\par Grieving\par Right shoulder arthritis\par knee OA \par  back pain - DDD\par s/p TKR for 11/6/17\par \par -  continue Orencia IV  month \par - restart Duloxetine 90 mg a day and Lyrica - check Istop\par -  d/c MTX , d/c Mobic\par -  Right shoulder IA c/steroid injection - see procedure note\par \par RTO 2-3 months

## 2019-05-02 ENCOUNTER — LABORATORY RESULT (OUTPATIENT)
Age: 79
End: 2019-05-02

## 2019-05-02 ENCOUNTER — APPOINTMENT (OUTPATIENT)
Dept: RHEUMATOLOGY | Facility: CLINIC | Age: 79
End: 2019-05-02
Payer: MEDICARE

## 2019-05-02 PROCEDURE — 96413 CHEMO IV INFUSION 1 HR: CPT

## 2019-05-02 PROCEDURE — 36415 COLL VENOUS BLD VENIPUNCTURE: CPT

## 2019-05-06 NOTE — ED ADULT NURSE NOTE - MUSCULOSKELETAL ASSESSMENT
Consent: The patient's consent was obtained including but not limited to risks of crusting, scabbing, blistering, scarring, darker or lighter pigmentary change, recurrence, incomplete removal and infection. Medical Necessity Information: It is in your best interest to select a reason for this procedure from the list below. All of these items fulfill various CMS LCD requirements except the new and changing color options. Detail Level: Detailed Aperture Size (Optional): D Render Post-Care Instructions In Note?: no Number Of Freeze-Thaw Cycles: 2 freeze-thaw cycles Post-Care Instructions: I reviewed with the patient in detail post-care instructions. Patient is to wear sunprotection, and avoid picking at any of the treated lesions. Pt may apply Vaseline to crusted or scabbing areas. Medical Necessity Clause: This procedure was medically necessary because the lesions that were treated were: WDL

## 2019-05-13 ENCOUNTER — APPOINTMENT (OUTPATIENT)
Dept: OPHTHALMOLOGY | Facility: CLINIC | Age: 79
End: 2019-05-13
Payer: MEDICARE

## 2019-05-13 PROCEDURE — 66821 AFTER CATARACT LASER SURGERY: CPT | Mod: RT

## 2019-05-20 ENCOUNTER — APPOINTMENT (OUTPATIENT)
Dept: OPHTHALMOLOGY | Facility: CLINIC | Age: 79
End: 2019-05-20
Payer: MEDICARE

## 2019-05-20 DIAGNOSIS — H26.491 OTHER SECONDARY CATARACT, RIGHT EYE: ICD-10-CM

## 2019-05-20 PROCEDURE — 99024 POSTOP FOLLOW-UP VISIT: CPT

## 2019-05-23 ENCOUNTER — MESSAGE (OUTPATIENT)
Age: 79
End: 2019-05-23

## 2019-05-28 ENCOUNTER — APPOINTMENT (OUTPATIENT)
Dept: RHEUMATOLOGY | Facility: CLINIC | Age: 79
End: 2019-05-28
Payer: MEDICARE

## 2019-05-28 ENCOUNTER — LABORATORY RESULT (OUTPATIENT)
Age: 79
End: 2019-05-28

## 2019-05-28 PROCEDURE — 96413 CHEMO IV INFUSION 1 HR: CPT

## 2019-05-28 PROCEDURE — 36415 COLL VENOUS BLD VENIPUNCTURE: CPT

## 2019-05-31 ENCOUNTER — APPOINTMENT (OUTPATIENT)
Dept: RHEUMATOLOGY | Facility: CLINIC | Age: 79
End: 2019-05-31

## 2019-06-14 ENCOUNTER — APPOINTMENT (OUTPATIENT)
Dept: RHEUMATOLOGY | Facility: CLINIC | Age: 79
End: 2019-06-14
Payer: MEDICARE

## 2019-06-14 VITALS
TEMPERATURE: 98.1 F | OXYGEN SATURATION: 96 % | SYSTOLIC BLOOD PRESSURE: 107 MMHG | HEART RATE: 93 BPM | HEIGHT: 64 IN | DIASTOLIC BLOOD PRESSURE: 73 MMHG

## 2019-06-14 PROCEDURE — 99214 OFFICE O/P EST MOD 30 MIN: CPT

## 2019-06-14 RX ORDER — PREGABALIN 50 MG/1
50 CAPSULE ORAL
Qty: 90 | Refills: 1 | Status: DISCONTINUED | COMMUNITY
Start: 2018-11-05 | End: 2019-06-14

## 2019-06-24 ENCOUNTER — LABORATORY RESULT (OUTPATIENT)
Age: 79
End: 2019-06-24

## 2019-06-24 ENCOUNTER — APPOINTMENT (OUTPATIENT)
Dept: RHEUMATOLOGY | Facility: CLINIC | Age: 79
End: 2019-06-24
Payer: MEDICARE

## 2019-06-24 PROCEDURE — 36415 COLL VENOUS BLD VENIPUNCTURE: CPT

## 2019-06-24 PROCEDURE — 96413 CHEMO IV INFUSION 1 HR: CPT

## 2019-07-22 ENCOUNTER — LABORATORY RESULT (OUTPATIENT)
Age: 79
End: 2019-07-22

## 2019-07-22 ENCOUNTER — APPOINTMENT (OUTPATIENT)
Dept: RHEUMATOLOGY | Facility: CLINIC | Age: 79
End: 2019-07-22
Payer: MEDICARE

## 2019-07-22 PROCEDURE — 36415 COLL VENOUS BLD VENIPUNCTURE: CPT

## 2019-07-22 PROCEDURE — 96413 CHEMO IV INFUSION 1 HR: CPT

## 2019-08-19 ENCOUNTER — APPOINTMENT (OUTPATIENT)
Dept: RHEUMATOLOGY | Facility: CLINIC | Age: 79
End: 2019-08-19
Payer: MEDICARE

## 2019-08-19 PROCEDURE — 96413 CHEMO IV INFUSION 1 HR: CPT

## 2019-08-19 PROCEDURE — 36415 COLL VENOUS BLD VENIPUNCTURE: CPT

## 2019-09-16 ENCOUNTER — LABORATORY RESULT (OUTPATIENT)
Age: 79
End: 2019-09-16

## 2019-09-16 ENCOUNTER — APPOINTMENT (OUTPATIENT)
Dept: RHEUMATOLOGY | Facility: CLINIC | Age: 79
End: 2019-09-16
Payer: MEDICARE

## 2019-09-16 ENCOUNTER — RX RENEWAL (OUTPATIENT)
Age: 79
End: 2019-09-16

## 2019-09-16 PROCEDURE — 96413 CHEMO IV INFUSION 1 HR: CPT

## 2019-09-16 PROCEDURE — 36415 COLL VENOUS BLD VENIPUNCTURE: CPT

## 2019-10-14 ENCOUNTER — LABORATORY RESULT (OUTPATIENT)
Age: 79
End: 2019-10-14

## 2019-10-14 ENCOUNTER — APPOINTMENT (OUTPATIENT)
Dept: RHEUMATOLOGY | Facility: CLINIC | Age: 79
End: 2019-10-14
Payer: MEDICARE

## 2019-10-14 PROCEDURE — 96413 CHEMO IV INFUSION 1 HR: CPT

## 2019-10-14 PROCEDURE — 90662 IIV NO PRSV INCREASED AG IM: CPT

## 2019-10-14 PROCEDURE — G0008: CPT

## 2019-10-14 PROCEDURE — 36415 COLL VENOUS BLD VENIPUNCTURE: CPT

## 2019-10-15 ENCOUNTER — RX RENEWAL (OUTPATIENT)
Age: 79
End: 2019-10-15

## 2019-10-16 ENCOUNTER — NON-APPOINTMENT (OUTPATIENT)
Age: 79
End: 2019-10-16

## 2019-10-16 ENCOUNTER — APPOINTMENT (OUTPATIENT)
Dept: OPHTHALMOLOGY | Facility: CLINIC | Age: 79
End: 2019-10-16
Payer: MEDICARE

## 2019-10-16 PROCEDURE — 92012 INTRM OPH EXAM EST PATIENT: CPT

## 2019-10-29 NOTE — OCCUPATIONAL THERAPY INITIAL EVALUATION ADULT - GENERAL OBSERVATIONS, REHAB EVAL
2019    RE: Olga Sheehan, : 1986      Subjective:  Olga is here for her second postoperative visit. She underwent laparoscopic cholecystectomy for symptomatic gallstones on 10/10/19.  Her incisional pain is gone, however she has noticed a kind of a burning pain radiating laterally to the left of her umbilical incision.  The pain tends to worsen throughout the day and is exacerbated by movement.  The pain has actually been getting worse over the past couple weeks.  Over-the-counter pain medications do not seem to help.  She has not noticed any bulging at her incisions.  She still following a low-fat diet because she has noticed that she does get looser stools when she eats fatty foods.      Objective:  Abd - obese, soft, 4 laparoscopic incisions healing well, no erythema, +normal healing ridge, no masses. The patient reports pain with palpation of her left abdomen, lateral to her umbilical incision. The area of tenderness extends several centimeters over to her left flank. I feel no underlying hernia or mass.      Plan:  Her pain is atypical for 3 weeks post laparoscopic surgery.  I do not feel any underlying hernias and her pain is not in the area of her incisions. This could be post-operative neuropathic pain and I have referred her to a pain specialist for evaluation.  Additionally, I have ordered a hepatic panel and lipase; perhaps this could be an atypical presentation of a retained common bile duct stone.      RTC PRN     Olga Adam MD     
Pt received sitting in bedside chair +tele +IVL +bp cuff +continuous pulse ox.

## 2019-10-30 ENCOUNTER — MESSAGE (OUTPATIENT)
Age: 79
End: 2019-10-30

## 2019-10-30 DIAGNOSIS — Z23 ENCOUNTER FOR IMMUNIZATION: ICD-10-CM

## 2019-11-18 ENCOUNTER — LABORATORY RESULT (OUTPATIENT)
Age: 79
End: 2019-11-18

## 2019-11-18 ENCOUNTER — APPOINTMENT (OUTPATIENT)
Dept: RHEUMATOLOGY | Facility: CLINIC | Age: 79
End: 2019-11-18
Payer: MEDICARE

## 2019-11-18 PROCEDURE — 90750 HZV VACC RECOMBINANT IM: CPT | Mod: NC,GY

## 2019-11-18 PROCEDURE — 90471 IMMUNIZATION ADMIN: CPT | Mod: GY

## 2019-11-18 PROCEDURE — 96413 CHEMO IV INFUSION 1 HR: CPT

## 2019-11-18 PROCEDURE — 36415 COLL VENOUS BLD VENIPUNCTURE: CPT

## 2019-11-22 ENCOUNTER — RX RENEWAL (OUTPATIENT)
Age: 79
End: 2019-11-22

## 2019-11-27 ENCOUNTER — APPOINTMENT (OUTPATIENT)
Dept: OPHTHALMOLOGY | Facility: CLINIC | Age: 79
End: 2019-11-27
Payer: MEDICARE

## 2019-11-27 ENCOUNTER — NON-APPOINTMENT (OUTPATIENT)
Age: 79
End: 2019-11-27

## 2019-11-27 PROCEDURE — 92012 INTRM OPH EXAM EST PATIENT: CPT

## 2019-12-16 ENCOUNTER — LABORATORY RESULT (OUTPATIENT)
Age: 79
End: 2019-12-16

## 2019-12-16 ENCOUNTER — APPOINTMENT (OUTPATIENT)
Dept: RHEUMATOLOGY | Facility: CLINIC | Age: 79
End: 2019-12-16
Payer: MEDICARE

## 2019-12-16 PROCEDURE — 96413 CHEMO IV INFUSION 1 HR: CPT

## 2019-12-16 PROCEDURE — 36415 COLL VENOUS BLD VENIPUNCTURE: CPT

## 2019-12-18 ENCOUNTER — NON-APPOINTMENT (OUTPATIENT)
Age: 79
End: 2019-12-18

## 2019-12-18 ENCOUNTER — APPOINTMENT (OUTPATIENT)
Dept: OPHTHALMOLOGY | Facility: CLINIC | Age: 79
End: 2019-12-18
Payer: MEDICARE

## 2019-12-18 PROCEDURE — 92012 INTRM OPH EXAM EST PATIENT: CPT

## 2019-12-23 ENCOUNTER — APPOINTMENT (OUTPATIENT)
Dept: OPHTHALMOLOGY | Facility: CLINIC | Age: 79
End: 2019-12-23
Payer: MEDICARE

## 2019-12-23 ENCOUNTER — NON-APPOINTMENT (OUTPATIENT)
Age: 79
End: 2019-12-23

## 2019-12-23 PROCEDURE — 92012 INTRM OPH EXAM EST PATIENT: CPT

## 2019-12-30 ENCOUNTER — APPOINTMENT (OUTPATIENT)
Dept: OPHTHALMOLOGY | Facility: CLINIC | Age: 79
End: 2019-12-30
Payer: MEDICARE

## 2019-12-30 ENCOUNTER — NON-APPOINTMENT (OUTPATIENT)
Age: 79
End: 2019-12-30

## 2019-12-30 PROCEDURE — 68761 CLOSE TEAR DUCT OPENING: CPT | Mod: E2

## 2019-12-30 PROCEDURE — 92012 INTRM OPH EXAM EST PATIENT: CPT | Mod: 25

## 2020-01-03 ENCOUNTER — APPOINTMENT (OUTPATIENT)
Dept: OPHTHALMOLOGY | Facility: CLINIC | Age: 80
End: 2020-01-03
Payer: MEDICARE

## 2020-01-03 ENCOUNTER — NON-APPOINTMENT (OUTPATIENT)
Age: 80
End: 2020-01-03

## 2020-01-03 PROCEDURE — 92012 INTRM OPH EXAM EST PATIENT: CPT

## 2020-01-13 ENCOUNTER — APPOINTMENT (OUTPATIENT)
Dept: RHEUMATOLOGY | Facility: CLINIC | Age: 80
End: 2020-01-13

## 2020-01-24 ENCOUNTER — APPOINTMENT (OUTPATIENT)
Dept: RHEUMATOLOGY | Facility: CLINIC | Age: 80
End: 2020-01-24
Payer: MEDICARE

## 2020-01-24 PROCEDURE — 96413 CHEMO IV INFUSION 1 HR: CPT

## 2020-01-24 PROCEDURE — 36415 COLL VENOUS BLD VENIPUNCTURE: CPT

## 2020-01-27 ENCOUNTER — NON-APPOINTMENT (OUTPATIENT)
Age: 80
End: 2020-01-27

## 2020-01-27 ENCOUNTER — APPOINTMENT (OUTPATIENT)
Dept: OPHTHALMOLOGY | Facility: CLINIC | Age: 80
End: 2020-01-27
Payer: MEDICARE

## 2020-01-27 PROCEDURE — 92012 INTRM OPH EXAM EST PATIENT: CPT

## 2020-02-10 ENCOUNTER — APPOINTMENT (OUTPATIENT)
Dept: RHEUMATOLOGY | Facility: CLINIC | Age: 80
End: 2020-02-10

## 2020-02-19 ENCOUNTER — NON-APPOINTMENT (OUTPATIENT)
Age: 80
End: 2020-02-19

## 2020-02-19 ENCOUNTER — APPOINTMENT (OUTPATIENT)
Dept: OPHTHALMOLOGY | Facility: CLINIC | Age: 80
End: 2020-02-19
Payer: MEDICARE

## 2020-02-19 PROCEDURE — 92012 INTRM OPH EXAM EST PATIENT: CPT

## 2020-02-19 PROCEDURE — 92015 DETERMINE REFRACTIVE STATE: CPT

## 2020-02-20 ENCOUNTER — LABORATORY RESULT (OUTPATIENT)
Age: 80
End: 2020-02-20

## 2020-02-20 ENCOUNTER — APPOINTMENT (OUTPATIENT)
Dept: RHEUMATOLOGY | Facility: CLINIC | Age: 80
End: 2020-02-20
Payer: MEDICARE

## 2020-02-20 PROCEDURE — 36415 COLL VENOUS BLD VENIPUNCTURE: CPT

## 2020-02-20 PROCEDURE — 96413 CHEMO IV INFUSION 1 HR: CPT

## 2020-03-16 ENCOUNTER — LABORATORY RESULT (OUTPATIENT)
Age: 80
End: 2020-03-16

## 2020-03-16 ENCOUNTER — APPOINTMENT (OUTPATIENT)
Dept: RHEUMATOLOGY | Facility: CLINIC | Age: 80
End: 2020-03-16
Payer: MEDICARE

## 2020-03-16 PROCEDURE — 96413 CHEMO IV INFUSION 1 HR: CPT

## 2020-03-16 PROCEDURE — 36415 COLL VENOUS BLD VENIPUNCTURE: CPT

## 2020-04-13 ENCOUNTER — LABORATORY RESULT (OUTPATIENT)
Age: 80
End: 2020-04-13

## 2020-04-13 ENCOUNTER — APPOINTMENT (OUTPATIENT)
Dept: RHEUMATOLOGY | Facility: CLINIC | Age: 80
End: 2020-04-13
Payer: MEDICARE

## 2020-04-13 PROCEDURE — 96413 CHEMO IV INFUSION 1 HR: CPT

## 2020-04-13 PROCEDURE — 36415 COLL VENOUS BLD VENIPUNCTURE: CPT

## 2020-04-20 ENCOUNTER — APPOINTMENT (OUTPATIENT)
Dept: OPHTHALMOLOGY | Facility: CLINIC | Age: 80
End: 2020-04-20
Payer: MEDICARE

## 2020-04-20 ENCOUNTER — NON-APPOINTMENT (OUTPATIENT)
Age: 80
End: 2020-04-20

## 2020-04-20 PROCEDURE — 99213 OFFICE O/P EST LOW 20 MIN: CPT | Mod: 95

## 2020-04-24 ENCOUNTER — NON-APPOINTMENT (OUTPATIENT)
Age: 80
End: 2020-04-24

## 2020-04-24 ENCOUNTER — APPOINTMENT (OUTPATIENT)
Dept: OPHTHALMOLOGY | Facility: CLINIC | Age: 80
End: 2020-04-24
Payer: MEDICARE

## 2020-04-24 PROCEDURE — 99213 OFFICE O/P EST LOW 20 MIN: CPT | Mod: 95

## 2020-05-11 ENCOUNTER — LABORATORY RESULT (OUTPATIENT)
Age: 80
End: 2020-05-11

## 2020-05-11 ENCOUNTER — NON-APPOINTMENT (OUTPATIENT)
Age: 80
End: 2020-05-11

## 2020-05-11 ENCOUNTER — APPOINTMENT (OUTPATIENT)
Dept: RHEUMATOLOGY | Facility: CLINIC | Age: 80
End: 2020-05-11
Payer: MEDICARE

## 2020-05-11 ENCOUNTER — APPOINTMENT (OUTPATIENT)
Dept: OPHTHALMOLOGY | Facility: CLINIC | Age: 80
End: 2020-05-11
Payer: MEDICARE

## 2020-05-11 PROCEDURE — 96413 CHEMO IV INFUSION 1 HR: CPT

## 2020-05-11 PROCEDURE — 92012 INTRM OPH EXAM EST PATIENT: CPT

## 2020-05-15 ENCOUNTER — APPOINTMENT (OUTPATIENT)
Dept: RHEUMATOLOGY | Facility: CLINIC | Age: 80
End: 2020-05-15
Payer: MEDICARE

## 2020-05-15 VITALS — SYSTOLIC BLOOD PRESSURE: 137 MMHG | DIASTOLIC BLOOD PRESSURE: 80 MMHG | OXYGEN SATURATION: 97 % | HEART RATE: 101 BPM

## 2020-05-15 DIAGNOSIS — D89.9 DISORDER INVOLVING THE IMMUNE MECHANISM, UNSPECIFIED: ICD-10-CM

## 2020-05-15 PROCEDURE — 99214 OFFICE O/P EST MOD 30 MIN: CPT

## 2020-05-15 NOTE — PHYSICAL EXAM
[General Appearance - Alert] : alert [General Appearance - In No Acute Distress] : in no acute distress [General Appearance - Well Nourished] : well nourished [Sclera] : the sclera and conjunctiva were normal [General Appearance - Well Developed] : well developed [Outer Ear] : the ears and nose were normal in appearance [Nasal Cavity] : the nasal mucosa and septum were normal [Respiration, Rhythm And Depth] : normal respiratory rhythm and effort [Auscultation Breath Sounds / Voice Sounds] : lungs were clear to auscultation bilaterally [Heart Sounds] : normal S1 and S2 [Heart Sounds Gallop] : no gallops [Murmurs] : no murmurs [Edema] : there was no peripheral edema [Bowel Sounds] : normal bowel sounds [Abdomen Tenderness] : non-tender [Abdomen Soft] : soft [Musculoskeletal - Swelling] : no joint swelling seen [] : no rash [Oriented To Time, Place, And Person] : oriented to person, place, and time [No Focal Deficits] : no focal deficits [Impaired Insight] : insight and judgment were intact [FreeTextEntry1] :   no active synovitis; only few tender points;  shoulder FROM, no effusion, knees- no effusion

## 2020-05-15 NOTE — ASSESSMENT
[FreeTextEntry1] : \par Rheumatoid arthritis \par on immunosuppressive therapy ( Orencia)\par Fibromyalgia\par knee OA \par  back pain - DDD\par s/p TKR for 11/6/17\par \par - discussed switching to sq Orencia, will check with [facility], but in meantime continue Orencia IV  month \par - continue off MTX \par \par RTO 4-6 months or earlier if needed

## 2020-05-15 NOTE — HISTORY OF PRESENT ILLNESS
[de-identified] : Last seen in June , 2019, but come in for IV Orencia monthly and labs are done monthly [FreeTextEntry1] :  Interval history:\par ---------------------\par off MTX, but continued Orencia IV monthly \par joint pain is better controlled\par difficult with dealing with emotions of COVID pandemic

## 2020-05-20 ENCOUNTER — APPOINTMENT (OUTPATIENT)
Dept: OPHTHALMOLOGY | Facility: CLINIC | Age: 80
End: 2020-05-20
Payer: MEDICARE

## 2020-05-20 ENCOUNTER — NON-APPOINTMENT (OUTPATIENT)
Age: 80
End: 2020-05-20

## 2020-05-20 PROCEDURE — 92012 INTRM OPH EXAM EST PATIENT: CPT

## 2020-05-22 ENCOUNTER — APPOINTMENT (OUTPATIENT)
Dept: OPHTHALMOLOGY | Facility: CLINIC | Age: 80
End: 2020-05-22
Payer: MEDICARE

## 2020-05-22 ENCOUNTER — NON-APPOINTMENT (OUTPATIENT)
Age: 80
End: 2020-05-22

## 2020-05-22 PROCEDURE — 92012 INTRM OPH EXAM EST PATIENT: CPT

## 2020-05-27 ENCOUNTER — NON-APPOINTMENT (OUTPATIENT)
Age: 80
End: 2020-05-27

## 2020-05-27 ENCOUNTER — APPOINTMENT (OUTPATIENT)
Dept: OPHTHALMOLOGY | Facility: CLINIC | Age: 80
End: 2020-05-27
Payer: MEDICARE

## 2020-05-27 PROCEDURE — 92012 INTRM OPH EXAM EST PATIENT: CPT

## 2020-06-03 ENCOUNTER — NON-APPOINTMENT (OUTPATIENT)
Age: 80
End: 2020-06-03

## 2020-06-03 ENCOUNTER — APPOINTMENT (OUTPATIENT)
Dept: OPHTHALMOLOGY | Facility: CLINIC | Age: 80
End: 2020-06-03
Payer: MEDICARE

## 2020-06-03 PROCEDURE — 92012 INTRM OPH EXAM EST PATIENT: CPT

## 2020-06-08 ENCOUNTER — APPOINTMENT (OUTPATIENT)
Dept: RHEUMATOLOGY | Facility: CLINIC | Age: 80
End: 2020-06-08
Payer: MEDICARE

## 2020-06-08 PROCEDURE — 96413 CHEMO IV INFUSION 1 HR: CPT

## 2020-06-08 PROCEDURE — 36415 COLL VENOUS BLD VENIPUNCTURE: CPT

## 2020-06-19 ENCOUNTER — APPOINTMENT (OUTPATIENT)
Dept: OPHTHALMOLOGY | Facility: CLINIC | Age: 80
End: 2020-06-19
Payer: MEDICARE

## 2020-06-19 ENCOUNTER — NON-APPOINTMENT (OUTPATIENT)
Age: 80
End: 2020-06-19

## 2020-06-19 PROCEDURE — 92012 INTRM OPH EXAM EST PATIENT: CPT

## 2020-07-06 ENCOUNTER — LABORATORY RESULT (OUTPATIENT)
Age: 80
End: 2020-07-06

## 2020-07-06 ENCOUNTER — APPOINTMENT (OUTPATIENT)
Dept: RHEUMATOLOGY | Facility: CLINIC | Age: 80
End: 2020-07-06
Payer: MEDICARE

## 2020-07-06 PROCEDURE — 36415 COLL VENOUS BLD VENIPUNCTURE: CPT

## 2020-07-06 PROCEDURE — 96413 CHEMO IV INFUSION 1 HR: CPT

## 2020-08-03 ENCOUNTER — LABORATORY RESULT (OUTPATIENT)
Age: 80
End: 2020-08-03

## 2020-08-03 ENCOUNTER — APPOINTMENT (OUTPATIENT)
Dept: RHEUMATOLOGY | Facility: CLINIC | Age: 80
End: 2020-08-03
Payer: MEDICARE

## 2020-08-03 PROCEDURE — 96413 CHEMO IV INFUSION 1 HR: CPT

## 2020-08-03 PROCEDURE — 36415 COLL VENOUS BLD VENIPUNCTURE: CPT

## 2020-08-30 ENCOUNTER — LABORATORY RESULT (OUTPATIENT)
Age: 80
End: 2020-08-30

## 2020-08-31 ENCOUNTER — APPOINTMENT (OUTPATIENT)
Dept: RHEUMATOLOGY | Facility: CLINIC | Age: 80
End: 2020-08-31
Payer: MEDICARE

## 2020-08-31 PROCEDURE — 96413 CHEMO IV INFUSION 1 HR: CPT

## 2020-08-31 PROCEDURE — 36415 COLL VENOUS BLD VENIPUNCTURE: CPT

## 2020-09-21 ENCOUNTER — NON-APPOINTMENT (OUTPATIENT)
Age: 80
End: 2020-09-21

## 2020-09-21 ENCOUNTER — APPOINTMENT (OUTPATIENT)
Dept: OPHTHALMOLOGY | Facility: CLINIC | Age: 80
End: 2020-09-21
Payer: MEDICARE

## 2020-09-21 PROCEDURE — 92012 INTRM OPH EXAM EST PATIENT: CPT

## 2020-09-22 ENCOUNTER — APPOINTMENT (OUTPATIENT)
Dept: OPHTHALMOLOGY | Facility: CLINIC | Age: 80
End: 2020-09-22
Payer: MEDICARE

## 2020-09-22 ENCOUNTER — NON-APPOINTMENT (OUTPATIENT)
Age: 80
End: 2020-09-22

## 2020-09-22 PROCEDURE — 92012 INTRM OPH EXAM EST PATIENT: CPT

## 2020-09-25 ENCOUNTER — NON-APPOINTMENT (OUTPATIENT)
Age: 80
End: 2020-09-25

## 2020-09-25 ENCOUNTER — APPOINTMENT (OUTPATIENT)
Dept: OPHTHALMOLOGY | Facility: CLINIC | Age: 80
End: 2020-09-25
Payer: MEDICARE

## 2020-09-25 PROCEDURE — 92012 INTRM OPH EXAM EST PATIENT: CPT

## 2020-09-29 ENCOUNTER — APPOINTMENT (OUTPATIENT)
Dept: RHEUMATOLOGY | Facility: CLINIC | Age: 80
End: 2020-09-29
Payer: MEDICARE

## 2020-09-29 ENCOUNTER — APPOINTMENT (OUTPATIENT)
Dept: OPHTHALMOLOGY | Facility: CLINIC | Age: 80
End: 2020-09-29
Payer: MEDICARE

## 2020-09-29 ENCOUNTER — NON-APPOINTMENT (OUTPATIENT)
Age: 80
End: 2020-09-29

## 2020-09-29 PROCEDURE — 92012 INTRM OPH EXAM EST PATIENT: CPT

## 2020-09-29 PROCEDURE — 36415 COLL VENOUS BLD VENIPUNCTURE: CPT

## 2020-09-29 PROCEDURE — 96413 CHEMO IV INFUSION 1 HR: CPT

## 2020-10-09 ENCOUNTER — OUTPATIENT (OUTPATIENT)
Dept: OUTPATIENT SERVICES | Facility: HOSPITAL | Age: 80
LOS: 1 days | End: 2020-10-09
Payer: MEDICARE

## 2020-10-09 ENCOUNTER — APPOINTMENT (OUTPATIENT)
Dept: RADIOLOGY | Facility: CLINIC | Age: 80
End: 2020-10-09
Payer: MEDICARE

## 2020-10-09 ENCOUNTER — APPOINTMENT (OUTPATIENT)
Dept: RHEUMATOLOGY | Facility: CLINIC | Age: 80
End: 2020-10-09
Payer: MEDICARE

## 2020-10-09 VITALS
WEIGHT: 191 LBS | HEIGHT: 64 IN | HEART RATE: 98 BPM | DIASTOLIC BLOOD PRESSURE: 87 MMHG | BODY MASS INDEX: 32.61 KG/M2 | OXYGEN SATURATION: 98 % | SYSTOLIC BLOOD PRESSURE: 141 MMHG | TEMPERATURE: 97 F

## 2020-10-09 DIAGNOSIS — M16.10 UNILATERAL PRIMARY OSTEOARTHRITIS, UNSPECIFIED HIP: ICD-10-CM

## 2020-10-09 DIAGNOSIS — Z98.89 OTHER SPECIFIED POSTPROCEDURAL STATES: Chronic | ICD-10-CM

## 2020-10-09 DIAGNOSIS — H26.9 UNSPECIFIED CATARACT: Chronic | ICD-10-CM

## 2020-10-09 DIAGNOSIS — M25.559 PAIN IN UNSPECIFIED HIP: ICD-10-CM

## 2020-10-09 PROCEDURE — 73522 X-RAY EXAM HIPS BI 3-4 VIEWS: CPT

## 2020-10-09 PROCEDURE — 90732 PPSV23 VACC 2 YRS+ SUBQ/IM: CPT

## 2020-10-09 PROCEDURE — G0009: CPT

## 2020-10-09 PROCEDURE — 73522 X-RAY EXAM HIPS BI 3-4 VIEWS: CPT | Mod: 26

## 2020-10-09 PROCEDURE — 99214 OFFICE O/P EST MOD 30 MIN: CPT | Mod: 25

## 2020-10-12 ENCOUNTER — NON-APPOINTMENT (OUTPATIENT)
Age: 80
End: 2020-10-12

## 2020-10-12 ENCOUNTER — APPOINTMENT (OUTPATIENT)
Dept: OPHTHALMOLOGY | Facility: CLINIC | Age: 80
End: 2020-10-12
Payer: MEDICARE

## 2020-10-12 PROCEDURE — 92012 INTRM OPH EXAM EST PATIENT: CPT

## 2020-10-14 ENCOUNTER — APPOINTMENT (OUTPATIENT)
Dept: PHYSICAL MEDICINE AND REHAB | Facility: CLINIC | Age: 80
End: 2020-10-14
Payer: MEDICARE

## 2020-10-14 VITALS
DIASTOLIC BLOOD PRESSURE: 90 MMHG | OXYGEN SATURATION: 96 % | TEMPERATURE: 98.1 F | HEART RATE: 96 BPM | SYSTOLIC BLOOD PRESSURE: 167 MMHG

## 2020-10-14 DIAGNOSIS — M25.562 PAIN IN LEFT KNEE: ICD-10-CM

## 2020-10-14 PROCEDURE — 99204 OFFICE O/P NEW MOD 45 MIN: CPT

## 2020-10-14 NOTE — ASSESSMENT
[FreeTextEntry1] : DEBBIE NUÑEZ,  a 80 year-old female with hx of PLDF 3 level fusion - presents with symptoms consistent with lumbar spondylosis + right hip pathology > right knee pain. Generalized body ache may be attributed to her fibromyalgia and RA - which she's followed by rheum.  Discussed all potential treatment options including PT, oral medications, and interventional procedures\par \par - order lumbar MRI - r/o adjacent segment disease \par - limited in what can be done for right knee given hx of TKR - refer back to ortho\par - stop NSAID - since she's been taking it chronically - try voltaren topical for knee and back\par - f/u for MRI results and discuss treatment plan

## 2020-10-14 NOTE — DATA REVIEWED
[CT Scan] : CT Scan [Plain X-Rays] : plain X-Rays [FreeTextEntry1] : Hip Xray - no significant hip OA - notes sclerotic changes to right hip\par \par Abd CT - notes 3 level fusion

## 2020-10-14 NOTE — REASON FOR VISIT
[Initial Evaluation] : an initial evaluation [FreeTextEntry1] : Generalized body pain - hx of RA referred by rheum

## 2020-10-14 NOTE — HISTORY OF PRESENT ILLNESS
[FreeTextEntry1] : Ms. DEBBIE NUÑEZ is a 80 year old female presents with\par \par Location: mid/low back most significant\par Inciting Event: no triggering event\par Onset: worsening of chronic - x 1 month\par Frequency: fairly constant\par Quality: dull, burning at times\par Severity: 8/10\par Aggravating Factor: standing, walking\par Relieving factor: sitting, rest\par Radiation: right groin, buttock + posterior thigh\par Associated symptoms: denies radicular pain, paresthesia, weakness, B/B incontinence, or saddle anesthesia\par \par Prior Treatments\par Injections: AZUCENA 2 years ago- with benefits\par Surgery: PLDF 3 level fusion (2 surgery), right TKR (2016)\par Medication: meloxicam QD, cymbalta for fibro, orencia for RA\par

## 2020-10-14 NOTE — PHYSICAL EXAM
[FreeTextEntry1] : General: NAD, alert and oriented x 3\par Psych: normal affect, intact judgment and insight\par HEENT: NC/AT, normal visual tracking\par Pulmonary: normal respiratory effort, chest expansion appears symmetrical\par CV: extremities appear pink and well perfused\par Abd: non-distended\par Ext: no c/c/e\par skin: normal color, appearance, and temperature\par \par Lumbar/Hip Spine:\par Gait - non-antalgic, able to heel and toe walk\par Inspection: normal muscle bulk without asymmetry\par Palpation: TTP over lumbar paraspinal, mild over right GT and right knee joint line\par ROM lumbar: within functional limits - pain worse with extension\par MMT: 5/5 bilateral lower extremities (HF, KE, KF, DF, PF, EHL)\par Reflexes: symmetric bilateral patella and ankle jerk\par Sensory: intact to light touch in all dermatomes of the bilateral lower extremities\par Provocative testing:\par Facet loading - positive\par Seated slump - negative\par FADIR - right pos\par DIETER - equivocal

## 2020-10-22 ENCOUNTER — APPOINTMENT (OUTPATIENT)
Dept: MRI IMAGING | Facility: CLINIC | Age: 80
End: 2020-10-22
Payer: MEDICARE

## 2020-10-22 ENCOUNTER — OUTPATIENT (OUTPATIENT)
Dept: OUTPATIENT SERVICES | Facility: HOSPITAL | Age: 80
LOS: 1 days | End: 2020-10-22
Payer: MEDICARE

## 2020-10-22 DIAGNOSIS — Z98.89 OTHER SPECIFIED POSTPROCEDURAL STATES: Chronic | ICD-10-CM

## 2020-10-22 DIAGNOSIS — H26.9 UNSPECIFIED CATARACT: Chronic | ICD-10-CM

## 2020-10-22 DIAGNOSIS — Z00.8 ENCOUNTER FOR OTHER GENERAL EXAMINATION: ICD-10-CM

## 2020-10-22 PROCEDURE — 72148 MRI LUMBAR SPINE W/O DYE: CPT

## 2020-10-22 PROCEDURE — 72148 MRI LUMBAR SPINE W/O DYE: CPT | Mod: 26

## 2020-10-26 ENCOUNTER — LABORATORY RESULT (OUTPATIENT)
Age: 80
End: 2020-10-26

## 2020-10-26 ENCOUNTER — APPOINTMENT (OUTPATIENT)
Dept: RHEUMATOLOGY | Facility: CLINIC | Age: 80
End: 2020-10-26
Payer: MEDICARE

## 2020-10-26 PROCEDURE — 96413 CHEMO IV INFUSION 1 HR: CPT

## 2020-10-26 PROCEDURE — 36415 COLL VENOUS BLD VENIPUNCTURE: CPT

## 2020-10-28 ENCOUNTER — APPOINTMENT (OUTPATIENT)
Dept: PHYSICAL MEDICINE AND REHAB | Facility: CLINIC | Age: 80
End: 2020-10-28
Payer: MEDICARE

## 2020-10-28 VITALS
SYSTOLIC BLOOD PRESSURE: 150 MMHG | DIASTOLIC BLOOD PRESSURE: 85 MMHG | TEMPERATURE: 97.3 F | OXYGEN SATURATION: 98 % | HEART RATE: 96 BPM

## 2020-10-28 DIAGNOSIS — Z01.818 ENCOUNTER FOR OTHER PREPROCEDURAL EXAMINATION: ICD-10-CM

## 2020-10-28 PROCEDURE — 99214 OFFICE O/P EST MOD 30 MIN: CPT

## 2020-10-28 NOTE — PHYSICAL EXAM
[FreeTextEntry1] : General: NAD, alert and oriented x 3\par Psych: normal affect, intact judgment and insight\par HEENT: NC/AT, normal visual tracking\par Pulmonary: normal respiratory effort, chest expansion appears symmetrical\par CV: extremities appear pink and well perfused\par Abd: non-distended\par Ext: no c/c/e\par skin: normal color, appearance, and temperature\par \par Lumbar/Hip Spine:\par Gait - non-antalgic, able to heel and toe walk\par Inspection: normal muscle bulk without asymmetry\par Palpation: TTP over lumbar paraspinal, mild over right GT and right knee joint line\par ROM lumbar: within functional limits - pain worse with extension\par MMT: 5/5 bilateral lower extremities (HF, KE, KF, DF, PF, EHL)\par Reflexes: symmetric bilateral patella and ankle jerk\par Sensory: intact to light touch in all dermatomes of the bilateral lower extremities\par Provocative testing:\par Facet loading - positive\par Seated slump - negative\par FADIR - right pos\par DIETER - equivocal to right

## 2020-10-28 NOTE — DATA REVIEWED
[Plain X-Rays] : plain X-Rays [CT Scan] : CT Scan [MRI] : MRI [FreeTextEntry1] : Lumbar MRI (10/2020)\par Status post posterior decompression and posterior instrumented fusion from L3 to L5 with adjacent segmental disease resulting in severe spinal canal stenosis and moderate to severe bilateral foraminal narrowing at L1-L2\par \par Hip Xray - no significant hip OA - notes sclerotic changes to right hip\par \par Abd CT - notes 3 level fusion

## 2020-10-28 NOTE — ASSESSMENT
[FreeTextEntry1] : DEBBIE NUÑEZ,  a 80 year-old female with hx of PLDF 3 level fusion - symptoms consistent with lumbar spondylosis + right hip pathology > right knee pain. Lumbar MRI results reveiwed. Generalized body ache may be attributed to her fibromyalgia and RA.  \par \par - discussed options of right hip CSI and lumbar DONNIE/MBB/RFA - risks/benefits and procedure details reviewed\par - limited in what can be done for right knee given hx of TKR - refer back to ortho\par - stop meloxicam since it causes her reflux and has been chronically using it - try voltaren topical for knee and back\par -  Will proceed with Right L1/2 TFESI.  If no relief will consider right hip intraarticular CSI under fluoro\par - For axil back pain will later have lumbar L3, L4, L5 MBB/RFA\par - Briefly discussed SCS if no relief after above procedures

## 2020-10-28 NOTE — HISTORY OF PRESENT ILLNESS
[FreeTextEntry1] : Ms. DEBBIE NUÑEZ is a 80 year old female s/p  PLDF 3 level fusion (2004 L4/5 level, 2008 L3-5).  Here today for clinical f/u and MRI review. Since the last appointment reports unchanged pain/symptoms.  Worse with walking ~1 block (limited by back pain), prolong sitting, transition from sit to stand. And right groin pain with certain movement. Slightly better with sitting  Pain is rated 10/10 when it shoots down right leg - right groin and posterior leg to knee.  Unable to sleep, waking up every hour. Denies B/B habit changes, saddle anesthesia, or extremity weakness.\par \par Prior Treatments\par Injections: AZUCENA 2 years ago- with benefits\par Surgery: PLDF 3 level fusion (2004/2008 surgery), right TKR (2016)\par Medication: meloxicam QD, cymbalta for fibro, orencia for RA\par

## 2020-10-29 ENCOUNTER — TRANSCRIPTION ENCOUNTER (OUTPATIENT)
Age: 80
End: 2020-10-29

## 2020-10-30 ENCOUNTER — APPOINTMENT (OUTPATIENT)
Dept: DISASTER EMERGENCY | Facility: CLINIC | Age: 80
End: 2020-10-30

## 2020-11-03 ENCOUNTER — APPOINTMENT (OUTPATIENT)
Dept: PHYSICAL MEDICINE AND REHAB | Facility: CLINIC | Age: 80
End: 2020-11-03

## 2020-11-03 ENCOUNTER — RX RENEWAL (OUTPATIENT)
Age: 80
End: 2020-11-03

## 2020-11-03 ENCOUNTER — OUTPATIENT (OUTPATIENT)
Dept: OUTPATIENT SERVICES | Facility: HOSPITAL | Age: 80
LOS: 1 days | End: 2020-11-03
Payer: MEDICARE

## 2020-11-03 DIAGNOSIS — M54.16 RADICULOPATHY, LUMBAR REGION: ICD-10-CM

## 2020-11-03 DIAGNOSIS — Z98.89 OTHER SPECIFIED POSTPROCEDURAL STATES: Chronic | ICD-10-CM

## 2020-11-03 DIAGNOSIS — H26.9 UNSPECIFIED CATARACT: Chronic | ICD-10-CM

## 2020-11-03 PROCEDURE — 64483 NJX AA&/STRD TFRM EPI L/S 1: CPT | Mod: RT

## 2020-11-03 PROCEDURE — 64483 NJX AA&/STRD TFRM EPI L/S 1: CPT

## 2020-11-06 ENCOUNTER — LABORATORY RESULT (OUTPATIENT)
Age: 80
End: 2020-11-06

## 2020-11-06 ENCOUNTER — APPOINTMENT (OUTPATIENT)
Dept: DISASTER EMERGENCY | Facility: CLINIC | Age: 80
End: 2020-11-06

## 2020-11-09 ENCOUNTER — NON-APPOINTMENT (OUTPATIENT)
Age: 80
End: 2020-11-09

## 2020-11-09 ENCOUNTER — APPOINTMENT (OUTPATIENT)
Dept: OPHTHALMOLOGY | Facility: CLINIC | Age: 80
End: 2020-11-09
Payer: MEDICARE

## 2020-11-09 PROCEDURE — 92012 INTRM OPH EXAM EST PATIENT: CPT

## 2020-11-10 ENCOUNTER — APPOINTMENT (OUTPATIENT)
Dept: PHYSICAL MEDICINE AND REHAB | Facility: CLINIC | Age: 80
End: 2020-11-10

## 2020-11-10 ENCOUNTER — OUTPATIENT (OUTPATIENT)
Dept: OUTPATIENT SERVICES | Facility: HOSPITAL | Age: 80
LOS: 1 days | End: 2020-11-10
Payer: MEDICARE

## 2020-11-10 DIAGNOSIS — M16.11 UNILATERAL PRIMARY OSTEOARTHRITIS, RIGHT HIP: ICD-10-CM

## 2020-11-10 DIAGNOSIS — M25.551 PAIN IN RIGHT HIP: ICD-10-CM

## 2020-11-10 DIAGNOSIS — M54.16 RADICULOPATHY, LUMBAR REGION: ICD-10-CM

## 2020-11-10 DIAGNOSIS — E11.65 TYPE 2 DIABETES MELLITUS WITH HYPERGLYCEMIA: ICD-10-CM

## 2020-11-10 DIAGNOSIS — Z98.89 OTHER SPECIFIED POSTPROCEDURAL STATES: Chronic | ICD-10-CM

## 2020-11-10 DIAGNOSIS — H26.9 UNSPECIFIED CATARACT: Chronic | ICD-10-CM

## 2020-11-10 PROCEDURE — 82962 GLUCOSE BLOOD TEST: CPT

## 2020-11-10 PROCEDURE — 20610 DRAIN/INJ JOINT/BURSA W/O US: CPT

## 2020-11-10 PROCEDURE — 77002 NEEDLE LOCALIZATION BY XRAY: CPT | Mod: 26

## 2020-11-10 PROCEDURE — 20610 DRAIN/INJ JOINT/BURSA W/O US: CPT | Mod: RT

## 2020-11-10 PROCEDURE — 77002 NEEDLE LOCALIZATION BY XRAY: CPT

## 2020-11-18 ENCOUNTER — APPOINTMENT (OUTPATIENT)
Dept: PHYSICAL MEDICINE AND REHAB | Facility: CLINIC | Age: 80
End: 2020-11-18
Payer: MEDICARE

## 2020-11-18 VITALS
SYSTOLIC BLOOD PRESSURE: 152 MMHG | DIASTOLIC BLOOD PRESSURE: 75 MMHG | TEMPERATURE: 97.5 F | HEART RATE: 88 BPM | OXYGEN SATURATION: 98 %

## 2020-11-18 PROCEDURE — 99214 OFFICE O/P EST MOD 30 MIN: CPT

## 2020-11-18 NOTE — HISTORY OF PRESENT ILLNESS
[FreeTextEntry1] : Ms. DEBBIE NUÑEZ is a 80 year old female s/p  PLDF 3 level fusion (2004 L4/5 level, 2008 L3-5). s/p Right L1/2 TFESI and right hip CSI with significant benefits.  She's here today reports axil back pain.  She subsequently notes HA with pressure to both eyes - worse in the AM.  She's going to f/u with her neuro for evaluation.\par \par Prior Treatments\par Injections: LESI 11/3/2020), Right hip CSI (11/10/2020) AZUCENA 2 years ago- with benefits\par Surgery: PLDF 3 level fusion (2004/2008 surgery), right TKR (2016)\par Medication: meloxicam QD, cymbalta for fibro, orencia for RA\par

## 2020-11-18 NOTE — PHYSICAL EXAM
[FreeTextEntry1] : General: NAD, alert and oriented x 3\par Psych: normal affect, intact judgment and insight\par HEENT: NC/AT, normal visual tracking\par Pulmonary: normal respiratory effort, chest expansion appears symmetrical\par CV: extremities appear pink and well perfused\par Abd: non-distended\par Ext: no c/c/e\par skin: normal color, appearance, and temperature\par \par Lumbar/Hip Spine:\par Gait - non-antalgic, able to heel and toe walk\par Inspection: normal muscle bulk without asymmetry\par Palpation: TTP over lumbar paraspinal, mild over right GT and right knee joint line\par ROM lumbar: within functional limits - pain worse with extension\par MMT: 5/5 bilateral lower extremities (HF, KE, KF, DF, PF, EHL)\par Reflexes: symmetric bilateral patella and ankle jerk\par Sensory: intact to light touch in all dermatomes of the bilateral lower extremities\par Provocative testing:\par Facet loading - positive\par Seated slump - negative\par FADIR - neg\par DIETER - equivocal to right

## 2020-11-18 NOTE — ASSESSMENT
[FreeTextEntry1] : DEBBIE NUÑEZ,  a 80 year-old female with hx of PLDF 3 level fusion - symptoms consistent with lumbar spondylosis. Generalized body ache may be attributed to her fibromyalgia and RA.  s/p LESI and right hip CSI with significant improvement now only with axil back pain\par - discussed options of lumbar MBB/RFA - risks/benefits and procedure details reviewed\par - interim: continue with home exercises/stretches

## 2020-11-20 ENCOUNTER — APPOINTMENT (OUTPATIENT)
Dept: DISASTER EMERGENCY | Facility: CLINIC | Age: 80
End: 2020-11-20

## 2020-11-23 ENCOUNTER — APPOINTMENT (OUTPATIENT)
Dept: RHEUMATOLOGY | Facility: CLINIC | Age: 80
End: 2020-11-23
Payer: MEDICARE

## 2020-11-23 PROCEDURE — 36415 COLL VENOUS BLD VENIPUNCTURE: CPT

## 2020-11-23 PROCEDURE — 96413 CHEMO IV INFUSION 1 HR: CPT

## 2020-11-24 ENCOUNTER — APPOINTMENT (OUTPATIENT)
Dept: PHYSICAL MEDICINE AND REHAB | Facility: CLINIC | Age: 80
End: 2020-11-24

## 2020-11-24 ENCOUNTER — OUTPATIENT (OUTPATIENT)
Dept: OUTPATIENT SERVICES | Facility: HOSPITAL | Age: 80
LOS: 1 days | End: 2020-11-24
Payer: MEDICARE

## 2020-11-24 DIAGNOSIS — H26.9 UNSPECIFIED CATARACT: Chronic | ICD-10-CM

## 2020-11-24 DIAGNOSIS — Z98.89 OTHER SPECIFIED POSTPROCEDURAL STATES: Chronic | ICD-10-CM

## 2020-11-24 DIAGNOSIS — M47.816 SPONDYLOSIS WITHOUT MYELOPATHY OR RADICULOPATHY, LUMBAR REGION: ICD-10-CM

## 2020-11-24 PROCEDURE — 64494 INJ PARAVERT F JNT L/S 2 LEV: CPT

## 2020-11-24 PROCEDURE — 64493 INJ PARAVERT F JNT L/S 1 LEV: CPT | Mod: 50

## 2020-11-24 PROCEDURE — 64493 INJ PARAVERT F JNT L/S 1 LEV: CPT

## 2020-11-27 ENCOUNTER — APPOINTMENT (OUTPATIENT)
Dept: DISASTER EMERGENCY | Facility: CLINIC | Age: 80
End: 2020-11-27

## 2020-11-27 LAB — SARS-COV-2 N GENE NPH QL NAA+PROBE: NOT DETECTED

## 2020-12-01 ENCOUNTER — APPOINTMENT (OUTPATIENT)
Dept: PHYSICAL MEDICINE AND REHAB | Facility: CLINIC | Age: 80
End: 2020-12-01

## 2020-12-01 ENCOUNTER — OUTPATIENT (OUTPATIENT)
Dept: OUTPATIENT SERVICES | Facility: HOSPITAL | Age: 80
LOS: 1 days | End: 2020-12-01
Payer: MEDICARE

## 2020-12-01 DIAGNOSIS — Z98.89 OTHER SPECIFIED POSTPROCEDURAL STATES: Chronic | ICD-10-CM

## 2020-12-01 DIAGNOSIS — M47.816 SPONDYLOSIS WITHOUT MYELOPATHY OR RADICULOPATHY, LUMBAR REGION: ICD-10-CM

## 2020-12-01 DIAGNOSIS — H26.9 UNSPECIFIED CATARACT: Chronic | ICD-10-CM

## 2020-12-01 PROCEDURE — 64493 INJ PARAVERT F JNT L/S 1 LEV: CPT | Mod: 50

## 2020-12-01 PROCEDURE — 64494 INJ PARAVERT F JNT L/S 2 LEV: CPT

## 2020-12-01 PROCEDURE — 64493 INJ PARAVERT F JNT L/S 1 LEV: CPT

## 2020-12-04 ENCOUNTER — APPOINTMENT (OUTPATIENT)
Dept: DISASTER EMERGENCY | Facility: CLINIC | Age: 80
End: 2020-12-04

## 2020-12-05 LAB — SARS-COV-2 N GENE NPH QL NAA+PROBE: NOT DETECTED

## 2020-12-08 ENCOUNTER — OUTPATIENT (OUTPATIENT)
Dept: OUTPATIENT SERVICES | Facility: HOSPITAL | Age: 80
LOS: 1 days | End: 2020-12-08
Payer: MEDICARE

## 2020-12-08 ENCOUNTER — APPOINTMENT (OUTPATIENT)
Dept: PHYSICAL MEDICINE AND REHAB | Facility: CLINIC | Age: 80
End: 2020-12-08

## 2020-12-08 DIAGNOSIS — M54.16 RADICULOPATHY, LUMBAR REGION: ICD-10-CM

## 2020-12-08 DIAGNOSIS — H26.9 UNSPECIFIED CATARACT: Chronic | ICD-10-CM

## 2020-12-08 DIAGNOSIS — Z98.89 OTHER SPECIFIED POSTPROCEDURAL STATES: Chronic | ICD-10-CM

## 2020-12-08 PROCEDURE — 64635 DESTROY LUMB/SAC FACET JNT: CPT

## 2020-12-08 PROCEDURE — 64635 DESTROY LUMB/SAC FACET JNT: CPT | Mod: RT

## 2020-12-08 PROCEDURE — 64636 DESTROY L/S FACET JNT ADDL: CPT

## 2020-12-11 ENCOUNTER — APPOINTMENT (OUTPATIENT)
Dept: DISASTER EMERGENCY | Facility: CLINIC | Age: 80
End: 2020-12-11

## 2020-12-13 LAB — SARS-COV-2 N GENE NPH QL NAA+PROBE: NOT DETECTED

## 2020-12-15 ENCOUNTER — OUTPATIENT (OUTPATIENT)
Dept: OUTPATIENT SERVICES | Facility: HOSPITAL | Age: 80
LOS: 1 days | End: 2020-12-15
Payer: MEDICARE

## 2020-12-15 ENCOUNTER — APPOINTMENT (OUTPATIENT)
Dept: PHYSICAL MEDICINE AND REHAB | Facility: CLINIC | Age: 80
End: 2020-12-15

## 2020-12-15 DIAGNOSIS — Z98.89 OTHER SPECIFIED POSTPROCEDURAL STATES: Chronic | ICD-10-CM

## 2020-12-15 DIAGNOSIS — M47.816 SPONDYLOSIS WITHOUT MYELOPATHY OR RADICULOPATHY, LUMBAR REGION: ICD-10-CM

## 2020-12-15 DIAGNOSIS — H26.9 UNSPECIFIED CATARACT: Chronic | ICD-10-CM

## 2020-12-15 PROCEDURE — 64635 DESTROY LUMB/SAC FACET JNT: CPT | Mod: LT,58

## 2020-12-15 PROCEDURE — 64635 DESTROY LUMB/SAC FACET JNT: CPT

## 2020-12-15 PROCEDURE — 64636 DESTROY L/S FACET JNT ADDL: CPT

## 2020-12-20 ENCOUNTER — LABORATORY RESULT (OUTPATIENT)
Age: 80
End: 2020-12-20

## 2020-12-21 ENCOUNTER — APPOINTMENT (OUTPATIENT)
Dept: RHEUMATOLOGY | Facility: CLINIC | Age: 80
End: 2020-12-21
Payer: MEDICARE

## 2020-12-21 PROCEDURE — 96413 CHEMO IV INFUSION 1 HR: CPT

## 2020-12-21 PROCEDURE — 36415 COLL VENOUS BLD VENIPUNCTURE: CPT

## 2020-12-22 LAB
SARS-COV-2 N GENE NPH QL NAA+PROBE: NOT DETECTED
SARS-COV-2 N GENE NPH QL NAA+PROBE: NOT DETECTED

## 2021-01-06 ENCOUNTER — APPOINTMENT (OUTPATIENT)
Dept: PHYSICAL MEDICINE AND REHAB | Facility: CLINIC | Age: 81
End: 2021-01-06
Payer: MEDICARE

## 2021-01-06 VITALS
HEART RATE: 82 BPM | OXYGEN SATURATION: 95 % | DIASTOLIC BLOOD PRESSURE: 82 MMHG | TEMPERATURE: 96.5 F | SYSTOLIC BLOOD PRESSURE: 134 MMHG

## 2021-01-06 PROCEDURE — 99213 OFFICE O/P EST LOW 20 MIN: CPT

## 2021-01-07 ENCOUNTER — TRANSCRIPTION ENCOUNTER (OUTPATIENT)
Age: 81
End: 2021-01-07

## 2021-01-08 ENCOUNTER — LABORATORY RESULT (OUTPATIENT)
Age: 81
End: 2021-01-08

## 2021-01-08 ENCOUNTER — APPOINTMENT (OUTPATIENT)
Dept: DISASTER EMERGENCY | Facility: CLINIC | Age: 81
End: 2021-01-08

## 2021-01-09 NOTE — PHYSICAL EXAM
[FreeTextEntry1] : General: NAD, alert and oriented x 3\par Psych: normal affect, intact judgment and insight\par HEENT: NC/AT, normal visual tracking\par Pulmonary: normal respiratory effort, chest expansion appears symmetrical\par CV: extremities appear pink and well perfused\par Abd: non-distended\par Ext: no c/c/e\par skin: normal color, appearance, and temperature\par \par Lumbar/Hip Spine:\par Gait - non-antalgic, able to heel and toe walk\par Inspection: normal muscle bulk without asymmetry\par Palpation: TTP over lumbar paraspinal, mild over right GT and right knee joint line\par ROM lumbar: within functional limits - pain worse with extension\par MMT: 5/5 bilateral lower extremities (HF, KE, KF, DF, PF, EHL)\par Reflexes: symmetric bilateral patella and ankle jerk\par Sensory: intact to light touch in all dermatomes of the bilateral lower extremities\par Provocative testing:\par Facet loading - positive\par Seated slump - negative\par FADIR - neg\par DIETER - equivocal to right. \par

## 2021-01-09 NOTE — HISTORY OF PRESENT ILLNESS
[FreeTextEntry1] : Patient presents for follow up and reports some improvement in back stiffness but still having significant low back pain. Pain is moderate and affecting quality of life.

## 2021-01-11 ENCOUNTER — NON-APPOINTMENT (OUTPATIENT)
Age: 81
End: 2021-01-11

## 2021-01-11 ENCOUNTER — APPOINTMENT (OUTPATIENT)
Dept: OPHTHALMOLOGY | Facility: CLINIC | Age: 81
End: 2021-01-11
Payer: MEDICARE

## 2021-01-11 PROCEDURE — 92012 INTRM OPH EXAM EST PATIENT: CPT

## 2021-01-12 ENCOUNTER — APPOINTMENT (OUTPATIENT)
Dept: PHYSICAL MEDICINE AND REHAB | Facility: CLINIC | Age: 81
End: 2021-01-12

## 2021-01-12 ENCOUNTER — OUTPATIENT (OUTPATIENT)
Dept: OUTPATIENT SERVICES | Facility: HOSPITAL | Age: 81
LOS: 1 days | End: 2021-01-12
Payer: MEDICARE

## 2021-01-12 DIAGNOSIS — H26.9 UNSPECIFIED CATARACT: Chronic | ICD-10-CM

## 2021-01-12 DIAGNOSIS — Z98.89 OTHER SPECIFIED POSTPROCEDURAL STATES: Chronic | ICD-10-CM

## 2021-01-12 DIAGNOSIS — M53.3 SACROCOCCYGEAL DISORDERS, NOT ELSEWHERE CLASSIFIED: ICD-10-CM

## 2021-01-12 PROCEDURE — G0260: CPT

## 2021-01-12 PROCEDURE — 27096 INJECT SACROILIAC JOINT: CPT | Mod: LT

## 2021-01-13 ENCOUNTER — NON-APPOINTMENT (OUTPATIENT)
Age: 81
End: 2021-01-13

## 2021-01-14 DIAGNOSIS — M46.1 SACROILIITIS, NOT ELSEWHERE CLASSIFIED: ICD-10-CM

## 2021-01-19 ENCOUNTER — LABORATORY RESULT (OUTPATIENT)
Age: 81
End: 2021-01-19

## 2021-01-19 ENCOUNTER — APPOINTMENT (OUTPATIENT)
Dept: RHEUMATOLOGY | Facility: CLINIC | Age: 81
End: 2021-01-19
Payer: MEDICARE

## 2021-01-19 PROCEDURE — 96413 CHEMO IV INFUSION 1 HR: CPT

## 2021-01-19 PROCEDURE — 36415 COLL VENOUS BLD VENIPUNCTURE: CPT

## 2021-02-03 ENCOUNTER — APPOINTMENT (OUTPATIENT)
Dept: PHYSICAL MEDICINE AND REHAB | Facility: CLINIC | Age: 81
End: 2021-02-03
Payer: MEDICARE

## 2021-02-03 VITALS
TEMPERATURE: 97.4 F | OXYGEN SATURATION: 94 % | DIASTOLIC BLOOD PRESSURE: 87 MMHG | SYSTOLIC BLOOD PRESSURE: 153 MMHG | HEART RATE: 106 BPM

## 2021-02-03 DIAGNOSIS — M53.3 SACROCOCCYGEAL DISORDERS, NOT ELSEWHERE CLASSIFIED: ICD-10-CM

## 2021-02-03 PROCEDURE — 99213 OFFICE O/P EST LOW 20 MIN: CPT

## 2021-02-04 ENCOUNTER — TRANSCRIPTION ENCOUNTER (OUTPATIENT)
Age: 81
End: 2021-02-04

## 2021-02-07 NOTE — ASSESSMENT
[FreeTextEntry1] : Pain returned. Repeat left SI joint injection and consider left sided SI joint RFA if pain returns again. Patient will call to schedule.

## 2021-02-07 NOTE — HISTORY OF PRESENT ILLNESS
[FreeTextEntry1] : Patient presents for follow up and reports she had significant improvement in low back pain after left SI joint injection, however pain returned after 2 weeks. Pain is now back to pre injection level. Pain is a 2-7/10. Also complaining of right sided low back pain now.

## 2021-02-16 ENCOUNTER — LABORATORY RESULT (OUTPATIENT)
Age: 81
End: 2021-02-16

## 2021-02-16 ENCOUNTER — APPOINTMENT (OUTPATIENT)
Dept: RHEUMATOLOGY | Facility: CLINIC | Age: 81
End: 2021-02-16
Payer: MEDICARE

## 2021-02-16 PROCEDURE — 96413 CHEMO IV INFUSION 1 HR: CPT

## 2021-02-16 PROCEDURE — 36415 COLL VENOUS BLD VENIPUNCTURE: CPT

## 2021-03-14 ENCOUNTER — LABORATORY RESULT (OUTPATIENT)
Age: 81
End: 2021-03-14

## 2021-03-15 ENCOUNTER — APPOINTMENT (OUTPATIENT)
Dept: RHEUMATOLOGY | Facility: CLINIC | Age: 81
End: 2021-03-15
Payer: MEDICARE

## 2021-03-15 PROCEDURE — 36415 COLL VENOUS BLD VENIPUNCTURE: CPT

## 2021-03-15 PROCEDURE — 96413 CHEMO IV INFUSION 1 HR: CPT

## 2021-03-23 DIAGNOSIS — H01.116 ALLERGIC DERMATITIS OF LEFT EYE, UNSPECIFIED EYELID: ICD-10-CM

## 2021-04-12 ENCOUNTER — LABORATORY RESULT (OUTPATIENT)
Age: 81
End: 2021-04-12

## 2021-04-12 ENCOUNTER — APPOINTMENT (OUTPATIENT)
Dept: OPHTHALMOLOGY | Facility: CLINIC | Age: 81
End: 2021-04-12
Payer: MEDICARE

## 2021-04-12 ENCOUNTER — APPOINTMENT (OUTPATIENT)
Dept: RHEUMATOLOGY | Facility: CLINIC | Age: 81
End: 2021-04-12
Payer: MEDICARE

## 2021-04-12 ENCOUNTER — NON-APPOINTMENT (OUTPATIENT)
Age: 81
End: 2021-04-12

## 2021-04-12 PROCEDURE — 92012 INTRM OPH EXAM EST PATIENT: CPT

## 2021-04-12 PROCEDURE — 96413 CHEMO IV INFUSION 1 HR: CPT

## 2021-04-12 PROCEDURE — 36415 COLL VENOUS BLD VENIPUNCTURE: CPT

## 2021-05-07 NOTE — ED ADULT NURSE NOTE - RESPIRATORY WDL
Breathing spontaneous and unlabored. Breath sounds clear and equal bilaterally with regular rhythm.
07-May-2021 14:19

## 2021-05-10 ENCOUNTER — LABORATORY RESULT (OUTPATIENT)
Age: 81
End: 2021-05-10

## 2021-05-10 ENCOUNTER — APPOINTMENT (OUTPATIENT)
Dept: RHEUMATOLOGY | Facility: CLINIC | Age: 81
End: 2021-05-10
Payer: MEDICARE

## 2021-05-10 PROCEDURE — 96365 THER/PROPH/DIAG IV INF INIT: CPT

## 2021-05-10 PROCEDURE — 36415 COLL VENOUS BLD VENIPUNCTURE: CPT

## 2021-05-10 PROCEDURE — 96413 CHEMO IV INFUSION 1 HR: CPT

## 2021-05-19 ENCOUNTER — APPOINTMENT (OUTPATIENT)
Dept: RHEUMATOLOGY | Facility: CLINIC | Age: 81
End: 2021-05-19
Payer: MEDICARE

## 2021-05-19 VITALS
SYSTOLIC BLOOD PRESSURE: 159 MMHG | TEMPERATURE: 97.3 F | HEART RATE: 96 BPM | HEIGHT: 64 IN | DIASTOLIC BLOOD PRESSURE: 87 MMHG | OXYGEN SATURATION: 97 %

## 2021-05-19 DIAGNOSIS — M15.9 POLYOSTEOARTHRITIS, UNSPECIFIED: ICD-10-CM

## 2021-05-19 PROCEDURE — 99214 OFFICE O/P EST MOD 30 MIN: CPT

## 2021-05-19 RX ORDER — DULOXETINE HYDROCHLORIDE 30 MG/1
30 CAPSULE, DELAYED RELEASE PELLETS ORAL
Qty: 90 | Refills: 0 | Status: COMPLETED | COMMUNITY
Start: 2017-02-08 | End: 2021-05-19

## 2021-05-20 LAB
COVID-19 SPIKE DOMAIN ANTIBODY INTERPRETATION: POSITIVE
HBV CORE IGM SER QL: NONREACTIVE
HBV SURFACE AB SER QL: NONREACTIVE
HBV SURFACE AG SER QL: NONREACTIVE
HCV AB SER QL: NONREACTIVE
HCV S/CO RATIO: 0.23 S/CO
SARS-COV-2 AB SERPL IA-ACNC: 163 U/ML

## 2021-05-21 LAB
M TB IFN-G BLD-IMP: NEGATIVE
QUANTIFERON TB PLUS MITOGEN MINUS NIL: 8.54 IU/ML
QUANTIFERON TB PLUS NIL: 0.04 IU/ML
QUANTIFERON TB PLUS TB1 MINUS NIL: 0 IU/ML
QUANTIFERON TB PLUS TB2 MINUS NIL: -0.01 IU/ML

## 2021-06-07 ENCOUNTER — LABORATORY RESULT (OUTPATIENT)
Age: 81
End: 2021-06-07

## 2021-06-07 ENCOUNTER — APPOINTMENT (OUTPATIENT)
Dept: RHEUMATOLOGY | Facility: CLINIC | Age: 81
End: 2021-06-07
Payer: MEDICARE

## 2021-06-07 PROCEDURE — 96413 CHEMO IV INFUSION 1 HR: CPT

## 2021-06-07 PROCEDURE — 36415 COLL VENOUS BLD VENIPUNCTURE: CPT

## 2021-06-12 NOTE — PATIENT PROFILE ADULT. - FUNCTIONAL SCREEN CURRENT LEVEL: COMMUNICATION, MLM
0) understands/communicates without difficulty

## 2021-07-06 ENCOUNTER — APPOINTMENT (OUTPATIENT)
Dept: RHEUMATOLOGY | Facility: CLINIC | Age: 81
End: 2021-07-06
Payer: MEDICARE

## 2021-07-06 ENCOUNTER — LABORATORY RESULT (OUTPATIENT)
Age: 81
End: 2021-07-06

## 2021-07-06 PROCEDURE — 36415 COLL VENOUS BLD VENIPUNCTURE: CPT

## 2021-07-06 PROCEDURE — 96413 CHEMO IV INFUSION 1 HR: CPT

## 2021-07-06 PROCEDURE — 96365 THER/PROPH/DIAG IV INF INIT: CPT

## 2021-08-02 ENCOUNTER — LABORATORY RESULT (OUTPATIENT)
Age: 81
End: 2021-08-02

## 2021-08-02 ENCOUNTER — APPOINTMENT (OUTPATIENT)
Dept: RHEUMATOLOGY | Facility: CLINIC | Age: 81
End: 2021-08-02
Payer: MEDICARE

## 2021-08-02 PROCEDURE — 96365 THER/PROPH/DIAG IV INF INIT: CPT

## 2021-08-02 PROCEDURE — 96413 CHEMO IV INFUSION 1 HR: CPT

## 2021-08-02 PROCEDURE — 36415 COLL VENOUS BLD VENIPUNCTURE: CPT

## 2021-08-16 ENCOUNTER — NON-APPOINTMENT (OUTPATIENT)
Age: 81
End: 2021-08-16

## 2021-08-16 ENCOUNTER — APPOINTMENT (OUTPATIENT)
Dept: OPHTHALMOLOGY | Facility: CLINIC | Age: 81
End: 2021-08-16
Payer: MEDICARE

## 2021-08-16 PROCEDURE — 92014 COMPRE OPH EXAM EST PT 1/>: CPT

## 2021-08-30 ENCOUNTER — LABORATORY RESULT (OUTPATIENT)
Age: 81
End: 2021-08-30

## 2021-08-30 ENCOUNTER — APPOINTMENT (OUTPATIENT)
Dept: RHEUMATOLOGY | Facility: CLINIC | Age: 81
End: 2021-08-30
Payer: MEDICARE

## 2021-08-30 PROCEDURE — 36415 COLL VENOUS BLD VENIPUNCTURE: CPT

## 2021-08-30 PROCEDURE — 96365 THER/PROPH/DIAG IV INF INIT: CPT

## 2021-08-30 PROCEDURE — 96413 CHEMO IV INFUSION 1 HR: CPT

## 2021-09-27 ENCOUNTER — APPOINTMENT (OUTPATIENT)
Dept: RHEUMATOLOGY | Facility: CLINIC | Age: 81
End: 2021-09-27

## 2021-10-25 ENCOUNTER — LABORATORY RESULT (OUTPATIENT)
Age: 81
End: 2021-10-25

## 2021-10-25 ENCOUNTER — APPOINTMENT (OUTPATIENT)
Dept: RHEUMATOLOGY | Facility: CLINIC | Age: 81
End: 2021-10-25
Payer: MEDICARE

## 2021-10-25 PROCEDURE — 96365 THER/PROPH/DIAG IV INF INIT: CPT

## 2021-10-25 PROCEDURE — 36415 COLL VENOUS BLD VENIPUNCTURE: CPT

## 2021-11-17 ENCOUNTER — NON-APPOINTMENT (OUTPATIENT)
Age: 81
End: 2021-11-17

## 2021-11-17 ENCOUNTER — APPOINTMENT (OUTPATIENT)
Dept: OPHTHALMOLOGY | Facility: CLINIC | Age: 81
End: 2021-11-17
Payer: MEDICARE

## 2021-11-17 PROCEDURE — 92012 INTRM OPH EXAM EST PATIENT: CPT

## 2021-11-22 ENCOUNTER — APPOINTMENT (OUTPATIENT)
Dept: RHEUMATOLOGY | Facility: CLINIC | Age: 81
End: 2021-11-22
Payer: MEDICARE

## 2021-11-22 ENCOUNTER — LABORATORY RESULT (OUTPATIENT)
Age: 81
End: 2021-11-22

## 2021-11-22 PROCEDURE — 96365 THER/PROPH/DIAG IV INF INIT: CPT

## 2021-11-22 PROCEDURE — 36415 COLL VENOUS BLD VENIPUNCTURE: CPT

## 2021-12-20 ENCOUNTER — APPOINTMENT (OUTPATIENT)
Dept: RHEUMATOLOGY | Facility: CLINIC | Age: 81
End: 2021-12-20
Payer: MEDICARE

## 2021-12-20 ENCOUNTER — LABORATORY RESULT (OUTPATIENT)
Age: 81
End: 2021-12-20

## 2021-12-20 PROCEDURE — 36415 COLL VENOUS BLD VENIPUNCTURE: CPT

## 2021-12-20 PROCEDURE — 96365 THER/PROPH/DIAG IV INF INIT: CPT

## 2021-12-30 ENCOUNTER — NON-APPOINTMENT (OUTPATIENT)
Age: 81
End: 2021-12-30

## 2022-01-18 ENCOUNTER — LABORATORY RESULT (OUTPATIENT)
Age: 82
End: 2022-01-18

## 2022-01-18 ENCOUNTER — APPOINTMENT (OUTPATIENT)
Dept: RHEUMATOLOGY | Facility: CLINIC | Age: 82
End: 2022-01-18
Payer: MEDICARE

## 2022-01-18 PROCEDURE — 96365 THER/PROPH/DIAG IV INF INIT: CPT

## 2022-01-18 PROCEDURE — 36415 COLL VENOUS BLD VENIPUNCTURE: CPT

## 2022-02-22 ENCOUNTER — LABORATORY RESULT (OUTPATIENT)
Age: 82
End: 2022-02-22

## 2022-02-22 ENCOUNTER — APPOINTMENT (OUTPATIENT)
Dept: RHEUMATOLOGY | Facility: CLINIC | Age: 82
End: 2022-02-22
Payer: MEDICARE

## 2022-02-22 PROCEDURE — 36415 COLL VENOUS BLD VENIPUNCTURE: CPT

## 2022-02-22 PROCEDURE — 96365 THER/PROPH/DIAG IV INF INIT: CPT

## 2022-03-21 ENCOUNTER — APPOINTMENT (OUTPATIENT)
Dept: RHEUMATOLOGY | Facility: CLINIC | Age: 82
End: 2022-03-21
Payer: MEDICARE

## 2022-03-21 ENCOUNTER — LABORATORY RESULT (OUTPATIENT)
Age: 82
End: 2022-03-21

## 2022-03-21 PROCEDURE — 96365 THER/PROPH/DIAG IV INF INIT: CPT

## 2022-03-21 PROCEDURE — 36415 COLL VENOUS BLD VENIPUNCTURE: CPT

## 2022-03-23 ENCOUNTER — APPOINTMENT (OUTPATIENT)
Dept: OPHTHALMOLOGY | Facility: CLINIC | Age: 82
End: 2022-03-23
Payer: MEDICARE

## 2022-03-23 ENCOUNTER — NON-APPOINTMENT (OUTPATIENT)
Age: 82
End: 2022-03-23

## 2022-03-23 PROCEDURE — 92014 COMPRE OPH EXAM EST PT 1/>: CPT

## 2022-03-23 PROCEDURE — 92250 FUNDUS PHOTOGRAPHY W/I&R: CPT

## 2022-04-11 ENCOUNTER — TRANSCRIPTION ENCOUNTER (OUTPATIENT)
Age: 82
End: 2022-04-11

## 2022-04-18 ENCOUNTER — LABORATORY RESULT (OUTPATIENT)
Age: 82
End: 2022-04-18

## 2022-04-18 ENCOUNTER — APPOINTMENT (OUTPATIENT)
Dept: RHEUMATOLOGY | Facility: CLINIC | Age: 82
End: 2022-04-18
Payer: MEDICARE

## 2022-04-18 PROCEDURE — 36415 COLL VENOUS BLD VENIPUNCTURE: CPT

## 2022-04-18 PROCEDURE — 96365 THER/PROPH/DIAG IV INF INIT: CPT

## 2022-05-17 RX ORDER — ABATACEPT 125 MG/ML
125 INJECTION, SOLUTION SUBCUTANEOUS
Qty: 12 | Refills: 0 | Status: COMPLETED | COMMUNITY
Start: 2020-04-13 | End: 2020-11-23

## 2022-05-18 ENCOUNTER — LABORATORY RESULT (OUTPATIENT)
Age: 82
End: 2022-05-18

## 2022-05-18 ENCOUNTER — APPOINTMENT (OUTPATIENT)
Dept: RHEUMATOLOGY | Facility: CLINIC | Age: 82
End: 2022-05-18
Payer: MEDICARE

## 2022-05-18 PROCEDURE — 36415 COLL VENOUS BLD VENIPUNCTURE: CPT

## 2022-05-18 PROCEDURE — 96365 THER/PROPH/DIAG IV INF INIT: CPT

## 2022-05-24 NOTE — PATIENT PROFILE ADULT. - NS PRO CONTRA FLU 1
Last office visit: 03/30/22    Per note from 03/30/22: Failed/prev: gabapentin (tried 1 week),     PDMP reviewed: no results  Last fill date: not on med list  # dispensed: NA  Due for refill: no  Next scheduled OV: 06/09/22 2:00pm    Spoke with patient who does not have Gabapentin on hand and has not taken it for awhile.  Patient will discuss  Further at upcoming appointment.  But she did disclose having in past and using it along with a muscle relaxer as needed when she was in pain (not as it was prescribed).  No refill requested at this time.  Writer called [facility]- no patient by this name in system is what writer was told.  Disregard request.  Patient now receives all scripts through [facility].   yes

## 2022-06-13 ENCOUNTER — APPOINTMENT (OUTPATIENT)
Dept: RHEUMATOLOGY | Facility: CLINIC | Age: 82
End: 2022-06-13

## 2022-06-14 ENCOUNTER — LABORATORY RESULT (OUTPATIENT)
Age: 82
End: 2022-06-14

## 2022-06-14 ENCOUNTER — APPOINTMENT (OUTPATIENT)
Dept: RHEUMATOLOGY | Facility: CLINIC | Age: 82
End: 2022-06-14
Payer: MEDICARE

## 2022-06-14 VITALS
SYSTOLIC BLOOD PRESSURE: 122 MMHG | OXYGEN SATURATION: 97 % | RESPIRATION RATE: 16 BRPM | HEART RATE: 88 BPM | DIASTOLIC BLOOD PRESSURE: 75 MMHG

## 2022-06-14 VITALS
TEMPERATURE: 98.2 F | HEART RATE: 89 BPM | DIASTOLIC BLOOD PRESSURE: 75 MMHG | OXYGEN SATURATION: 95 % | RESPIRATION RATE: 16 BRPM | SYSTOLIC BLOOD PRESSURE: 144 MMHG

## 2022-06-14 PROCEDURE — 36415 COLL VENOUS BLD VENIPUNCTURE: CPT

## 2022-06-14 PROCEDURE — 96365 THER/PROPH/DIAG IV INF INIT: CPT

## 2022-06-14 PROCEDURE — 96366 THER/PROPH/DIAG IV INF ADDON: CPT

## 2022-06-14 RX ORDER — ABATACEPT 250 MG/15ML
250 INJECTION, POWDER, LYOPHILIZED, FOR SOLUTION INTRAVENOUS
Qty: 0 | Refills: 0 | Status: COMPLETED | OUTPATIENT
Start: 2022-06-07

## 2022-07-01 ENCOUNTER — APPOINTMENT (OUTPATIENT)
Dept: RHEUMATOLOGY | Facility: CLINIC | Age: 82
End: 2022-07-01

## 2022-07-01 VITALS
DIASTOLIC BLOOD PRESSURE: 85 MMHG | SYSTOLIC BLOOD PRESSURE: 174 MMHG | BODY MASS INDEX: 32.61 KG/M2 | WEIGHT: 191 LBS | TEMPERATURE: 97.2 F | HEIGHT: 64 IN | HEART RATE: 72 BPM | OXYGEN SATURATION: 94 %

## 2022-07-01 DIAGNOSIS — Z11.59 ENCOUNTER FOR SCREENING FOR OTHER VIRAL DISEASES: ICD-10-CM

## 2022-07-01 DIAGNOSIS — M81.0 AGE-RELATED OSTEOPOROSIS W/OUT CURRENT PATHOLOGICAL FRACTURE: ICD-10-CM

## 2022-07-01 DIAGNOSIS — K21.9 GASTRO-ESOPHAGEAL REFLUX DISEASE W/OUT ESOPHAGITIS: ICD-10-CM

## 2022-07-01 PROCEDURE — 99214 OFFICE O/P EST MOD 30 MIN: CPT

## 2022-07-01 RX ORDER — CLOBETASOL PROPIONATE 0.5 MG/ML
0.05 SOLUTION TOPICAL
Qty: 50 | Refills: 0 | Status: COMPLETED | COMMUNITY
Start: 2022-06-03

## 2022-07-01 RX ORDER — METHYLPREDNISOLONE 4 MG/1
4 TABLET ORAL
Qty: 21 | Refills: 0 | Status: COMPLETED | COMMUNITY
Start: 2022-06-14

## 2022-07-01 RX ORDER — LEVOCETIRIZINE DIHYDROCHLORIDE 5 MG/1
5 TABLET ORAL
Qty: 30 | Refills: 0 | Status: COMPLETED | COMMUNITY
Start: 2022-04-04

## 2022-07-01 RX ORDER — OMEPRAZOLE 40 MG/1
40 CAPSULE, DELAYED RELEASE ORAL
Refills: 0 | Status: ACTIVE | COMMUNITY
Start: 2022-03-09

## 2022-07-01 RX ORDER — DIPHENOXYLATE HYDROCHLORIDE AND ATROPINE SULFATE 2.5; .025 MG/1; MG/1
2.5-0.025 TABLET ORAL
Qty: 60 | Refills: 0 | Status: COMPLETED | COMMUNITY
Start: 2022-03-30

## 2022-07-01 RX ORDER — ZOLPIDEM TARTRATE 10 MG/1
10 TABLET ORAL
Qty: 30 | Refills: 0 | Status: ACTIVE | COMMUNITY
Start: 2022-03-14

## 2022-07-11 ENCOUNTER — APPOINTMENT (OUTPATIENT)
Dept: RHEUMATOLOGY | Facility: CLINIC | Age: 82
End: 2022-07-11

## 2022-07-11 VITALS
SYSTOLIC BLOOD PRESSURE: 160 MMHG | TEMPERATURE: 97.5 F | OXYGEN SATURATION: 99 % | DIASTOLIC BLOOD PRESSURE: 80 MMHG | RESPIRATION RATE: 16 BRPM | HEART RATE: 76 BPM

## 2022-07-11 VITALS
SYSTOLIC BLOOD PRESSURE: 160 MMHG | DIASTOLIC BLOOD PRESSURE: 77 MMHG | OXYGEN SATURATION: 99 % | HEART RATE: 70 BPM | RESPIRATION RATE: 16 BRPM

## 2022-07-11 LAB
COVID-19 NUCLEOCAPSID  GAM ANTIBODY INTERPRETATION: NEGATIVE
COVID-19 SPIKE DOMAIN ANTIBODY INTERPRETATION: POSITIVE
SARS-COV-2 AB SERPL IA-ACNC: >250 U/ML
SARS-COV-2 AB SERPL QL IA: 0.06 INDEX

## 2022-07-11 PROCEDURE — 36415 COLL VENOUS BLD VENIPUNCTURE: CPT

## 2022-07-11 PROCEDURE — 96365 THER/PROPH/DIAG IV INF INIT: CPT

## 2022-07-11 RX ORDER — ABATACEPT 250 MG/15ML
250 INJECTION, POWDER, LYOPHILIZED, FOR SOLUTION INTRAVENOUS
Qty: 0 | Refills: 0 | Status: COMPLETED | OUTPATIENT
Start: 2022-07-05

## 2022-07-12 LAB
HBV CORE IGG+IGM SER QL: NONREACTIVE
HBV CORE IGM SER QL: NONREACTIVE
HBV SURFACE AB SER QL: NONREACTIVE
HBV SURFACE AG SER QL: NONREACTIVE
HCV AB SER QL: NONREACTIVE
HCV S/CO RATIO: 0.21 S/CO

## 2022-07-20 ENCOUNTER — NON-APPOINTMENT (OUTPATIENT)
Age: 82
End: 2022-07-20

## 2022-07-20 ENCOUNTER — APPOINTMENT (OUTPATIENT)
Dept: OPHTHALMOLOGY | Facility: CLINIC | Age: 82
End: 2022-07-20

## 2022-07-20 PROCEDURE — 92014 COMPRE OPH EXAM EST PT 1/>: CPT

## 2022-07-20 PROCEDURE — 92250 FUNDUS PHOTOGRAPHY W/I&R: CPT

## 2022-07-27 ENCOUNTER — APPOINTMENT (OUTPATIENT)
Dept: NEUROSURGERY | Facility: CLINIC | Age: 82
End: 2022-07-27

## 2022-08-09 ENCOUNTER — LABORATORY RESULT (OUTPATIENT)
Age: 82
End: 2022-08-09

## 2022-08-09 ENCOUNTER — APPOINTMENT (OUTPATIENT)
Dept: RHEUMATOLOGY | Facility: CLINIC | Age: 82
End: 2022-08-09

## 2022-08-09 VITALS
RESPIRATION RATE: 16 BRPM | OXYGEN SATURATION: 97 % | SYSTOLIC BLOOD PRESSURE: 128 MMHG | TEMPERATURE: 97.9 F | DIASTOLIC BLOOD PRESSURE: 74 MMHG | HEART RATE: 89 BPM

## 2022-08-09 VITALS
DIASTOLIC BLOOD PRESSURE: 70 MMHG | SYSTOLIC BLOOD PRESSURE: 112 MMHG | HEART RATE: 76 BPM | RESPIRATION RATE: 16 BRPM | OXYGEN SATURATION: 98 %

## 2022-08-09 PROCEDURE — 36415 COLL VENOUS BLD VENIPUNCTURE: CPT

## 2022-08-09 PROCEDURE — 96365 THER/PROPH/DIAG IV INF INIT: CPT

## 2022-08-09 RX ORDER — ABATACEPT 250 MG/15ML
250 INJECTION, POWDER, LYOPHILIZED, FOR SOLUTION INTRAVENOUS
Qty: 0 | Refills: 0 | Status: COMPLETED | OUTPATIENT
Start: 2022-08-02

## 2022-08-22 ENCOUNTER — APPOINTMENT (OUTPATIENT)
Dept: RHEUMATOLOGY | Facility: CLINIC | Age: 82
End: 2022-08-22

## 2022-08-24 ENCOUNTER — APPOINTMENT (OUTPATIENT)
Dept: SPINE | Facility: CLINIC | Age: 82
End: 2022-08-24

## 2022-08-24 VITALS — BODY MASS INDEX: 32.61 KG/M2 | HEIGHT: 64 IN | WEIGHT: 191 LBS

## 2022-08-24 VITALS — OXYGEN SATURATION: 99 % | HEART RATE: 84 BPM | SYSTOLIC BLOOD PRESSURE: 148 MMHG | DIASTOLIC BLOOD PRESSURE: 105 MMHG

## 2022-08-24 PROCEDURE — 99204 OFFICE O/P NEW MOD 45 MIN: CPT

## 2022-09-07 ENCOUNTER — APPOINTMENT (OUTPATIENT)
Dept: SPINE | Facility: CLINIC | Age: 82
End: 2022-09-07

## 2022-09-07 ENCOUNTER — APPOINTMENT (OUTPATIENT)
Dept: RHEUMATOLOGY | Facility: CLINIC | Age: 82
End: 2022-09-07

## 2022-09-07 ENCOUNTER — LABORATORY RESULT (OUTPATIENT)
Age: 82
End: 2022-09-07

## 2022-09-07 VITALS
SYSTOLIC BLOOD PRESSURE: 153 MMHG | BODY MASS INDEX: 32.61 KG/M2 | WEIGHT: 191 LBS | DIASTOLIC BLOOD PRESSURE: 79 MMHG | HEART RATE: 102 BPM | HEIGHT: 64 IN | OXYGEN SATURATION: 96 %

## 2022-09-07 VITALS — HEART RATE: 87 BPM | RESPIRATION RATE: 96 BRPM | SYSTOLIC BLOOD PRESSURE: 150 MMHG | DIASTOLIC BLOOD PRESSURE: 76 MMHG

## 2022-09-07 VITALS
TEMPERATURE: 97.4 F | RESPIRATION RATE: 16 BRPM | HEART RATE: 102 BPM | OXYGEN SATURATION: 95 % | SYSTOLIC BLOOD PRESSURE: 135 MMHG | DIASTOLIC BLOOD PRESSURE: 79 MMHG

## 2022-09-07 DIAGNOSIS — M47.816 SPONDYLOSIS W/OUT MYELOPATHY OR RADICULOPATHY, LUMBAR REGION: ICD-10-CM

## 2022-09-07 DIAGNOSIS — Z98.1 ARTHRODESIS STATUS: ICD-10-CM

## 2022-09-07 DIAGNOSIS — M54.9 DORSALGIA, UNSPECIFIED: ICD-10-CM

## 2022-09-07 PROCEDURE — 96365 THER/PROPH/DIAG IV INF INIT: CPT

## 2022-09-07 PROCEDURE — 99213 OFFICE O/P EST LOW 20 MIN: CPT

## 2022-09-07 PROCEDURE — 36415 COLL VENOUS BLD VENIPUNCTURE: CPT

## 2022-09-07 RX ORDER — PREDNISOLONE ACETATE 10 MG/ML
1 SUSPENSION/ DROPS OPHTHALMIC 4 TIMES DAILY
Qty: 1 | Refills: 2 | Status: COMPLETED | COMMUNITY
Start: 2019-05-13 | End: 2022-09-07

## 2022-09-07 RX ORDER — ASPIRIN 81 MG/1
81 TABLET, CHEWABLE ORAL
Qty: 30 | Refills: 6 | Status: DISCONTINUED | COMMUNITY
Start: 2017-11-09 | End: 2022-09-07

## 2022-09-07 RX ORDER — ABATACEPT 250 MG/15ML
250 INJECTION, POWDER, LYOPHILIZED, FOR SOLUTION INTRAVENOUS
Qty: 0 | Refills: 0 | Status: COMPLETED | OUTPATIENT
Start: 2022-08-30

## 2022-09-07 RX ORDER — ROSUVASTATIN CALCIUM 20 MG/1
20 TABLET, FILM COATED ORAL
Qty: 30 | Refills: 3 | Status: DISCONTINUED | COMMUNITY
Start: 2017-11-24 | End: 2022-09-07

## 2022-09-07 RX ORDER — TRAZODONE HYDROCHLORIDE 50 MG/1
50 TABLET ORAL
Qty: 30 | Refills: 0 | Status: DISCONTINUED | COMMUNITY
Start: 2018-01-11 | End: 2022-09-07

## 2022-09-08 DIAGNOSIS — R74.01 ELEVATION OF LEVELS OF LIVER TRANSAMINASE LEVELS: ICD-10-CM

## 2022-10-03 ENCOUNTER — APPOINTMENT (OUTPATIENT)
Dept: RHEUMATOLOGY | Facility: CLINIC | Age: 82
End: 2022-10-03

## 2022-10-03 ENCOUNTER — LABORATORY RESULT (OUTPATIENT)
Age: 82
End: 2022-10-03

## 2022-10-03 VITALS
OXYGEN SATURATION: 97 % | HEART RATE: 75 BPM | TEMPERATURE: 97.8 F | DIASTOLIC BLOOD PRESSURE: 79 MMHG | RESPIRATION RATE: 16 BRPM | SYSTOLIC BLOOD PRESSURE: 157 MMHG

## 2022-10-03 VITALS
DIASTOLIC BLOOD PRESSURE: 75 MMHG | SYSTOLIC BLOOD PRESSURE: 157 MMHG | RESPIRATION RATE: 16 BRPM | OXYGEN SATURATION: 97 % | HEART RATE: 69 BPM

## 2022-10-03 PROCEDURE — 36415 COLL VENOUS BLD VENIPUNCTURE: CPT

## 2022-10-03 PROCEDURE — 96365 THER/PROPH/DIAG IV INF INIT: CPT

## 2022-10-03 RX ORDER — ABATACEPT 250 MG/15ML
250 INJECTION, POWDER, LYOPHILIZED, FOR SOLUTION INTRAVENOUS
Qty: 0 | Refills: 0 | Status: COMPLETED | OUTPATIENT
Start: 2022-09-27

## 2022-10-31 ENCOUNTER — APPOINTMENT (OUTPATIENT)
Dept: RHEUMATOLOGY | Facility: CLINIC | Age: 82
End: 2022-10-31

## 2022-10-31 VITALS
SYSTOLIC BLOOD PRESSURE: 147 MMHG | DIASTOLIC BLOOD PRESSURE: 81 MMHG | TEMPERATURE: 97.2 F | HEART RATE: 103 BPM | OXYGEN SATURATION: 95 % | RESPIRATION RATE: 16 BRPM

## 2022-10-31 VITALS
DIASTOLIC BLOOD PRESSURE: 81 MMHG | HEART RATE: 82 BPM | OXYGEN SATURATION: 98 % | RESPIRATION RATE: 16 BRPM | SYSTOLIC BLOOD PRESSURE: 145 MMHG

## 2022-10-31 PROCEDURE — 96365 THER/PROPH/DIAG IV INF INIT: CPT

## 2022-10-31 RX ORDER — ABATACEPT 250 MG/15ML
250 INJECTION, POWDER, LYOPHILIZED, FOR SOLUTION INTRAVENOUS
Qty: 0 | Refills: 0 | Status: COMPLETED | OUTPATIENT
Start: 2022-10-25

## 2022-11-28 ENCOUNTER — APPOINTMENT (OUTPATIENT)
Dept: RHEUMATOLOGY | Facility: CLINIC | Age: 82
End: 2022-11-28

## 2022-11-28 ENCOUNTER — LABORATORY RESULT (OUTPATIENT)
Age: 82
End: 2022-11-28

## 2022-11-28 VITALS
DIASTOLIC BLOOD PRESSURE: 84 MMHG | SYSTOLIC BLOOD PRESSURE: 176 MMHG | OXYGEN SATURATION: 96 % | HEART RATE: 75 BPM | RESPIRATION RATE: 16 BRPM

## 2022-11-28 VITALS
TEMPERATURE: 98.1 F | OXYGEN SATURATION: 98 % | RESPIRATION RATE: 16 BRPM | DIASTOLIC BLOOD PRESSURE: 92 MMHG | HEART RATE: 90 BPM | SYSTOLIC BLOOD PRESSURE: 131 MMHG

## 2022-11-28 PROCEDURE — 96365 THER/PROPH/DIAG IV INF INIT: CPT

## 2022-11-28 PROCEDURE — 36415 COLL VENOUS BLD VENIPUNCTURE: CPT

## 2022-11-28 RX ORDER — ABATACEPT 250 MG/15ML
250 INJECTION, POWDER, LYOPHILIZED, FOR SOLUTION INTRAVENOUS
Qty: 0 | Refills: 0 | Status: COMPLETED | OUTPATIENT
Start: 2022-11-22

## 2022-11-28 NOTE — HISTORY OF PRESENT ILLNESS
[4] : 4 [N/A] : N/A [Denies] : Denies [No] : No [Yes] : Yes [Declined] : Declined [Left upper extremity] : Left upper extremity [24g] : 24g [Start Time: ___] : Medication Start Time: [unfilled] [End Time: ___] : Medication End Time: [unfilled] [IV discontinued. Intact. No signs or symptoms of IV complications noted. Time: ___] : IV discontinued. Intact. No signs or symptoms of IV complications noted. Time: [unfilled] [Patient  instructed to seek medical attention with signs and symptoms of adverse effects] : Patient  instructed to seek medical attention with signs and symptoms of adverse effects [Patient left unit in no acute distress] : Patient left unit in no acute distress [Medications administered as ordered and tolerated well.] : Medications administered as ordered and tolerated well. [Blood drawn at time of visit] : Blood drawn at time of visit [de-identified] : fingers [de-identified] : Median cubital vein [de-identified] : Routine labs drawn as per protocol [de-identified] : Patient presents for Orencia infusion, doing well overall. Patient reports pain in her fingers rated a 4 on the pain scale above, joint stiffness from her neck to her shoulders, denies any swelling. Patient admits to taking amoxicillin after getting 2 teeth removed 2 weeks ago, patient will be getting 6 implants within the coming months. Patient tolerated infusion well.

## 2022-12-22 PROBLEM — M81.0 OSTEOPOROSIS, POSTMENOPAUSAL: Status: ACTIVE | Noted: 2022-07-01

## 2022-12-27 ENCOUNTER — LABORATORY RESULT (OUTPATIENT)
Age: 82
End: 2022-12-27

## 2022-12-27 ENCOUNTER — APPOINTMENT (OUTPATIENT)
Dept: RHEUMATOLOGY | Facility: CLINIC | Age: 82
End: 2022-12-27

## 2022-12-27 VITALS
HEART RATE: 74 BPM | OXYGEN SATURATION: 98 % | SYSTOLIC BLOOD PRESSURE: 153 MMHG | RESPIRATION RATE: 16 BRPM | DIASTOLIC BLOOD PRESSURE: 88 MMHG

## 2022-12-27 VITALS
HEART RATE: 91 BPM | OXYGEN SATURATION: 97 % | SYSTOLIC BLOOD PRESSURE: 164 MMHG | RESPIRATION RATE: 16 BRPM | TEMPERATURE: 97.5 F | DIASTOLIC BLOOD PRESSURE: 91 MMHG

## 2022-12-27 PROCEDURE — 36415 COLL VENOUS BLD VENIPUNCTURE: CPT

## 2022-12-27 PROCEDURE — 96365 THER/PROPH/DIAG IV INF INIT: CPT

## 2022-12-27 RX ORDER — ABATACEPT 250 MG/15ML
250 INJECTION, POWDER, LYOPHILIZED, FOR SOLUTION INTRAVENOUS
Qty: 0 | Refills: 0 | Status: COMPLETED | OUTPATIENT
Start: 2022-12-20

## 2022-12-27 NOTE — HISTORY OF PRESENT ILLNESS
[4] : 4 [N/A] : N/A [Denies] : Denies [No] : No [Yes] : Yes [Right upper extremity] : Right upper extremity [24g] : 24g [Start Time: ___] : Medication Start Time: [unfilled] [End Time: ___] : Medication End Time: [unfilled] [IV discontinued. Intact. No signs or symptoms of IV complications noted. Time: ___] : IV discontinued. Intact. No signs or symptoms of IV complications noted. Time: [unfilled] [Patient  instructed to seek medical attention with signs and symptoms of adverse effects] : Patient  instructed to seek medical attention with signs and symptoms of adverse effects [Patient left unit in no acute distress] : Patient left unit in no acute distress [Medications administered as ordered and tolerated well.] : Medications administered as ordered and tolerated well. [Blood drawn at time of visit] : Blood drawn at time of visit [de-identified] : fingers [de-identified] : AC [de-identified] : Orencia 750 mg IVSS\par \par Routine labs drawn as per protocol [de-identified] : Patient presents for Orencia infusion, doing well overall. Patient reports pain in her fingers as rated  above, joint stiffness from her neck to her shoulders, denies any swelling.

## 2023-01-18 ENCOUNTER — APPOINTMENT (OUTPATIENT)
Dept: OPHTHALMOLOGY | Facility: CLINIC | Age: 83
End: 2023-01-18
Payer: MEDICARE

## 2023-01-18 ENCOUNTER — NON-APPOINTMENT (OUTPATIENT)
Age: 83
End: 2023-01-18

## 2023-01-18 PROCEDURE — 92012 INTRM OPH EXAM EST PATIENT: CPT

## 2023-01-24 ENCOUNTER — APPOINTMENT (OUTPATIENT)
Dept: RHEUMATOLOGY | Facility: CLINIC | Age: 83
End: 2023-01-24
Payer: MEDICARE

## 2023-01-24 VITALS
OXYGEN SATURATION: 97 % | DIASTOLIC BLOOD PRESSURE: 83 MMHG | RESPIRATION RATE: 16 BRPM | HEART RATE: 77 BPM | SYSTOLIC BLOOD PRESSURE: 139 MMHG

## 2023-01-24 VITALS
HEART RATE: 81 BPM | OXYGEN SATURATION: 97 % | TEMPERATURE: 98 F | SYSTOLIC BLOOD PRESSURE: 153 MMHG | RESPIRATION RATE: 16 BRPM | DIASTOLIC BLOOD PRESSURE: 73 MMHG

## 2023-01-24 PROCEDURE — 96365 THER/PROPH/DIAG IV INF INIT: CPT

## 2023-01-24 PROCEDURE — 36415 COLL VENOUS BLD VENIPUNCTURE: CPT

## 2023-01-24 RX ORDER — ABATACEPT 250 MG/15ML
250 INJECTION, POWDER, LYOPHILIZED, FOR SOLUTION INTRAVENOUS
Qty: 0 | Refills: 0 | Status: COMPLETED | OUTPATIENT
Start: 2023-01-17

## 2023-01-24 NOTE — HISTORY OF PRESENT ILLNESS
[3] : 3 [N/A] : N/A [Denies] : Denies [No] : No [Yes] : Yes [22g] : 22g [Start Time: ___] : Medication Start Time: [unfilled] [End Time: ___] : Medication End Time: [unfilled] [IV discontinued. Intact. No signs or symptoms of IV complications noted. Time: ___] : IV discontinued. Intact. No signs or symptoms of IV complications noted. Time: [unfilled] [Patient  instructed to seek medical attention with signs and symptoms of adverse effects] : Patient  instructed to seek medical attention with signs and symptoms of adverse effects [Patient left unit in no acute distress] : Patient left unit in no acute distress [Medications administered as ordered and tolerated well.] : Medications administered as ordered and tolerated well. [Blood drawn at time of visit] : Blood drawn at time of visit [de-identified] : left shoulder [de-identified] : pinched nerve pain [de-identified] : eye infection/ on ABX [de-identified] : left arm median cubital vein  [de-identified] : labs with every other infusion- none today [de-identified] : Patient presents for scheduled  Orencia infusion, ambulatory ion NAD. Today, patient reports pain as rated above/ receives PT x 2 a week for the same. Patient also reports  limited ROM  to left arm.  Patient reports stiffness to lower back and digits, denies any swelling. Reports moderate daily fatigue.

## 2023-01-30 ENCOUNTER — NON-APPOINTMENT (OUTPATIENT)
Age: 83
End: 2023-01-30

## 2023-01-30 ENCOUNTER — APPOINTMENT (OUTPATIENT)
Dept: NEUROSURGERY | Facility: CLINIC | Age: 83
End: 2023-01-30
Payer: MEDICARE

## 2023-01-30 DIAGNOSIS — R26.2 DIFFICULTY IN WALKING, NOT ELSEWHERE CLASSIFIED: ICD-10-CM

## 2023-01-30 DIAGNOSIS — R26.9 UNSPECIFIED ABNORMALITIES OF GAIT AND MOBILITY: ICD-10-CM

## 2023-01-30 PROCEDURE — 99213 OFFICE O/P EST LOW 20 MIN: CPT

## 2023-02-01 NOTE — ASSESSMENT
[FreeTextEntry1] : Ms. DEBBIE NUÑEZ is presenting with Cervical Spinal Stenosis\par Dr. Griggs explained in great detail a diagnosis of Cervical Spinal Stenosis\par The recommendation is for the following:\par Continual monitoring\par PT and OT for muscular strengthening exercise\par Follow up in 6 weeks\par \par \par \par Please see Dr. Griggs's dictation for details.\par I, Dr. Allan Griggs evaluated the patient with the nurse practitioner Alan Pat and established the plan of care. I personally discuss this patient with the nurse practitioner at the time of the visit. I agree with the assessment and plan as written, unless noted below.\par \par

## 2023-02-01 NOTE — HISTORY OF PRESENT ILLNESS
[FreeTextEntry1] : left arm heaviness, gait instability [de-identified] : Ms. DEBBIE NUÑEZ is a 83 year presenting with a PMHx of osteoporosis, Hypothyroid, Sjorgeon's syndrome, Lumbar Stenosis S/P Lumbar surgeries X 2 (12 years ago?) spinal stimulator trail by Dr. Dudley  who presents for comprehensive neurosurgical evaluation of Lumbar Spine. She was seen and evaluated by Dr. Missael Leo.\par Left arm weakness and heaviness and neck pain. \par

## 2023-02-01 NOTE — PHYSICAL EXAM
[Neck Appearance] : the appearance of the neck was normal [] : no respiratory distress [Heart Rate And Rhythm] : heart rate was normal and rhythm regular [FreeTextEntry1] : Slow steady gait

## 2023-02-21 ENCOUNTER — APPOINTMENT (OUTPATIENT)
Dept: RHEUMATOLOGY | Facility: CLINIC | Age: 83
End: 2023-02-21
Payer: MEDICARE

## 2023-02-21 VITALS
OXYGEN SATURATION: 96 % | RESPIRATION RATE: 16 BRPM | HEART RATE: 88 BPM | SYSTOLIC BLOOD PRESSURE: 160 MMHG | DIASTOLIC BLOOD PRESSURE: 83 MMHG

## 2023-02-21 VITALS
RESPIRATION RATE: 16 BRPM | OXYGEN SATURATION: 95 % | TEMPERATURE: 97.2 F | DIASTOLIC BLOOD PRESSURE: 85 MMHG | SYSTOLIC BLOOD PRESSURE: 170 MMHG | HEART RATE: 99 BPM

## 2023-02-21 LAB
ALBUMIN SERPL ELPH-MCNC: 3.9 G/DL
ALP BLD-CCNC: 128 U/L
ALT SERPL-CCNC: 29 U/L
ANION GAP SERPL CALC-SCNC: 13 MMOL/L
AST SERPL-CCNC: 24 U/L
BASOPHILS # BLD AUTO: 0.06 K/UL
BASOPHILS NFR BLD AUTO: 0.8 %
BILIRUB SERPL-MCNC: 0.2 MG/DL
BUN SERPL-MCNC: 18 MG/DL
CALCIUM SERPL-MCNC: 9.6 MG/DL
CHLORIDE SERPL-SCNC: 102 MMOL/L
CO2 SERPL-SCNC: 25 MMOL/L
CREAT SERPL-MCNC: 0.79 MG/DL
CRP SERPL-MCNC: 9 MG/L
EGFR: 75 ML/MIN/1.73M2
EOSINOPHIL # BLD AUTO: 0.67 K/UL
EOSINOPHIL NFR BLD AUTO: 8.5 %
ERYTHROCYTE [SEDIMENTATION RATE] IN BLOOD BY WESTERGREN METHOD: 25 MM/HR
HCT VFR BLD CALC: 39.6 %
HGB BLD-MCNC: 12.3 G/DL
IMM GRANULOCYTES NFR BLD AUTO: 0.3 %
LYMPHOCYTES # BLD AUTO: 2.9 K/UL
LYMPHOCYTES NFR BLD AUTO: 36.8 %
MAN DIFF?: NORMAL
MCHC RBC-ENTMCNC: 29.2 PG
MCHC RBC-ENTMCNC: 31.1 GM/DL
MCV RBC AUTO: 94.1 FL
MONOCYTES # BLD AUTO: 0.49 K/UL
MONOCYTES NFR BLD AUTO: 6.2 %
NEUTROPHILS # BLD AUTO: 3.73 K/UL
NEUTROPHILS NFR BLD AUTO: 47.4 %
PLATELET # BLD AUTO: 279 K/UL
POTASSIUM SERPL-SCNC: 4 MMOL/L
PROT SERPL-MCNC: 6.1 G/DL
RBC # BLD: 4.21 M/UL
RBC # FLD: 13.9 %
SODIUM SERPL-SCNC: 140 MMOL/L
WBC # FLD AUTO: 7.87 K/UL

## 2023-02-21 PROCEDURE — 96365 THER/PROPH/DIAG IV INF INIT: CPT

## 2023-02-21 PROCEDURE — 36415 COLL VENOUS BLD VENIPUNCTURE: CPT

## 2023-02-21 RX ORDER — ABATACEPT 250 MG/15ML
250 INJECTION, POWDER, LYOPHILIZED, FOR SOLUTION INTRAVENOUS
Qty: 0 | Refills: 0 | Status: COMPLETED | OUTPATIENT
Start: 2023-02-14

## 2023-02-21 NOTE — HISTORY OF PRESENT ILLNESS
[4] : 4 [N/A] : N/A [Denies] : Denies [No] : No [Yes] : Yes [22g] : 22g [Start Time: ___] : Medication Start Time: [unfilled] [End Time: ___] : Medication End Time: [unfilled] [IV discontinued. Intact. No signs or symptoms of IV complications noted. Time: ___] : IV discontinued. Intact. No signs or symptoms of IV complications noted. Time: [unfilled] [Patient  instructed to seek medical attention with signs and symptoms of adverse effects] : Patient  instructed to seek medical attention with signs and symptoms of adverse effects [Patient left unit in no acute distress] : Patient left unit in no acute distress [Medications administered as ordered and tolerated well.] : Medications administered as ordered and tolerated well. [Blood drawn at time of visit] : Blood drawn at time of visit [de-identified] : left shoulder, lower back [de-identified] : achy [de-identified] : left arm median cubital vein  [de-identified] : labs with every other infusion- labs drawn today [de-identified] : Patient presents for scheduled  Orencia infusion, ambulatory ion NAD. Today, patient reports pain as rated above. Patient also reports  limited ROM  to left arm.  Patient reports stiffness to lower back and digits, denies any swelling. Reports moderate daily fatigue and also admits having "poor balance" and holding to something when walking, denies falls.  No other concerns verbalized.

## 2023-02-22 ENCOUNTER — APPOINTMENT (OUTPATIENT)
Dept: OPHTHALMOLOGY | Facility: CLINIC | Age: 83
End: 2023-02-22
Payer: MEDICARE

## 2023-02-22 ENCOUNTER — NON-APPOINTMENT (OUTPATIENT)
Age: 83
End: 2023-02-22

## 2023-02-22 PROCEDURE — 92012 INTRM OPH EXAM EST PATIENT: CPT

## 2023-02-27 ENCOUNTER — NON-APPOINTMENT (OUTPATIENT)
Age: 83
End: 2023-02-27

## 2023-02-27 ENCOUNTER — APPOINTMENT (OUTPATIENT)
Dept: OPHTHALMOLOGY | Facility: CLINIC | Age: 83
End: 2023-02-27
Payer: MEDICARE

## 2023-02-27 PROCEDURE — 92012 INTRM OPH EXAM EST PATIENT: CPT

## 2023-03-01 ENCOUNTER — APPOINTMENT (OUTPATIENT)
Dept: OPHTHALMOLOGY | Facility: CLINIC | Age: 83
End: 2023-03-01
Payer: MEDICARE

## 2023-03-01 ENCOUNTER — NON-APPOINTMENT (OUTPATIENT)
Age: 83
End: 2023-03-01

## 2023-03-01 PROCEDURE — 92012 INTRM OPH EXAM EST PATIENT: CPT

## 2023-03-13 RX ORDER — ABATACEPT 250 MG/15ML
250 INJECTION, POWDER, LYOPHILIZED, FOR SOLUTION INTRAVENOUS
Qty: 0 | Refills: 0 | Status: COMPLETED | OUTPATIENT
Start: 2022-05-19 | End: 1900-01-01

## 2023-03-20 ENCOUNTER — APPOINTMENT (OUTPATIENT)
Dept: OPHTHALMOLOGY | Facility: CLINIC | Age: 83
End: 2023-03-20
Payer: MEDICARE

## 2023-03-20 ENCOUNTER — APPOINTMENT (OUTPATIENT)
Dept: RHEUMATOLOGY | Facility: CLINIC | Age: 83
End: 2023-03-20
Payer: MEDICARE

## 2023-03-20 ENCOUNTER — NON-APPOINTMENT (OUTPATIENT)
Age: 83
End: 2023-03-20

## 2023-03-20 VITALS
DIASTOLIC BLOOD PRESSURE: 74 MMHG | OXYGEN SATURATION: 97 % | SYSTOLIC BLOOD PRESSURE: 156 MMHG | RESPIRATION RATE: 16 BRPM | HEART RATE: 88 BPM

## 2023-03-20 VITALS
OXYGEN SATURATION: 98 % | RESPIRATION RATE: 16 BRPM | HEART RATE: 85 BPM | BODY MASS INDEX: 27.46 KG/M2 | TEMPERATURE: 97.2 F | WEIGHT: 160 LBS | SYSTOLIC BLOOD PRESSURE: 163 MMHG | DIASTOLIC BLOOD PRESSURE: 82 MMHG

## 2023-03-20 PROCEDURE — 96365 THER/PROPH/DIAG IV INF INIT: CPT

## 2023-03-20 PROCEDURE — 92012 INTRM OPH EXAM EST PATIENT: CPT

## 2023-03-20 PROCEDURE — 36415 COLL VENOUS BLD VENIPUNCTURE: CPT

## 2023-03-20 RX ORDER — ABATACEPT 250 MG/15ML
250 INJECTION, POWDER, LYOPHILIZED, FOR SOLUTION INTRAVENOUS
Qty: 0 | Refills: 0 | Status: COMPLETED | OUTPATIENT
Start: 2023-03-14

## 2023-03-20 NOTE — HISTORY OF PRESENT ILLNESS
[N/A] : N/A [Denies] : Denies [No] : No [Yes] : Yes [Declined] : Declined [5] : 5 [Right upper extremity] : Right upper extremity [22g] : 22g [Start Time: ___] : Medication Start Time: [unfilled] [End Time: ___] : Medication End Time: [unfilled] [IV discontinued. Intact. No signs or symptoms of IV complications noted. Time: ___] : IV discontinued. Intact. No signs or symptoms of IV complications noted. Time: [unfilled] [Patient  instructed to seek medical attention with signs and symptoms of adverse effects] : Patient  instructed to seek medical attention with signs and symptoms of adverse effects [Patient left unit in no acute distress] : Patient left unit in no acute distress [Medications administered as ordered and tolerated well.] : Medications administered as ordered and tolerated well. [Blood drawn at time of visit] : Blood drawn at time of visit [de-identified] : right wrist [de-identified] : Right medial vein [de-identified] : Patient presented for Orencia infusion. Patient reports to have pain on right wrist as rated as above. Other than that, no other complaints or symptoms verbalized. Patient BP elevated today. Patient denies of chest pain, SOB, dizziness, HA.  No recent infection or abx use.

## 2023-03-24 ENCOUNTER — APPOINTMENT (OUTPATIENT)
Dept: OPHTHALMOLOGY | Facility: CLINIC | Age: 83
End: 2023-03-24

## 2023-04-17 ENCOUNTER — APPOINTMENT (OUTPATIENT)
Dept: RHEUMATOLOGY | Facility: CLINIC | Age: 83
End: 2023-04-17
Payer: MEDICARE

## 2023-04-17 VITALS
DIASTOLIC BLOOD PRESSURE: 80 MMHG | RESPIRATION RATE: 16 BRPM | SYSTOLIC BLOOD PRESSURE: 147 MMHG | OXYGEN SATURATION: 96 % | HEART RATE: 88 BPM

## 2023-04-17 VITALS
RESPIRATION RATE: 16 BRPM | DIASTOLIC BLOOD PRESSURE: 102 MMHG | OXYGEN SATURATION: 97 % | SYSTOLIC BLOOD PRESSURE: 151 MMHG | TEMPERATURE: 97.8 F | HEART RATE: 102 BPM

## 2023-04-17 PROCEDURE — 96365 THER/PROPH/DIAG IV INF INIT: CPT

## 2023-04-17 PROCEDURE — 36415 COLL VENOUS BLD VENIPUNCTURE: CPT

## 2023-04-17 RX ORDER — ABATACEPT 250 MG/15ML
250 INJECTION, POWDER, LYOPHILIZED, FOR SOLUTION INTRAVENOUS
Qty: 0 | Refills: 0 | Status: COMPLETED | OUTPATIENT
Start: 2023-04-11

## 2023-04-17 NOTE — ED ADULT NURSE NOTE - CAS DISCH ACCOMP BY
CERTIFICATE OF RETURN TO GYM        Regarding: Alessandra Quintero        This is to certify that Alessandra Quintero has been under my care from 4/17/2023 and is cleared to return to Gym class with no use of the right upper extremity.     RESTRICTIONS: No use of the right upper extremity      REMARKS: The patient will be re-evaluated in 2 months.        SIGNATURE:___________________________________________,   4/17/2023                          Boo Ndiaye MD        
Self

## 2023-04-17 NOTE — HISTORY OF PRESENT ILLNESS
[4] : 4 [N/A] : N/A [Denies] : Denies [No] : No [Yes] : Yes [22g] : 22g [Start Time: ___] : Medication Start Time: [unfilled] [End Time: ___] : Medication End Time: [unfilled] [IV discontinued. Intact. No signs or symptoms of IV complications noted. Time: ___] : IV discontinued. Intact. No signs or symptoms of IV complications noted. Time: [unfilled] [Patient  instructed to seek medical attention with signs and symptoms of adverse effects] : Patient  instructed to seek medical attention with signs and symptoms of adverse effects [Patient left unit in no acute distress] : Patient left unit in no acute distress [Medications administered as ordered and tolerated well.] : Medications administered as ordered and tolerated well. [Blood drawn at time of visit] : Blood drawn at time of visit [de-identified] :  shoulders, lower back and knees [de-identified] : achy [de-identified] : right arm median cubital vein  [de-identified] : labs with every other infusion- labs drawn today [de-identified] : Patient presents for scheduled  Orencia infusion, ambulatory ion NAD. Today, patient reports pain with ambulation and movements as rated  above and also reports  limited ROM  to left arm.  Patient reports stiffness to lower back and digits lasting about 20 minutes and fatigue. Denies any joints swelling. Patient reports taking ABX for left eye infection and also stated that she received cortisone shot last week for her back.  No other concerns or symptoms  verbalized.

## 2023-04-20 LAB
ALBUMIN SERPL ELPH-MCNC: 4 G/DL
ALP BLD-CCNC: 136 U/L
ALT SERPL-CCNC: 32 U/L
ANION GAP SERPL CALC-SCNC: 14 MMOL/L
AST SERPL-CCNC: 23 U/L
BASOPHILS # BLD AUTO: 0.09 K/UL
BASOPHILS NFR BLD AUTO: 0.8 %
BILIRUB SERPL-MCNC: 0.2 MG/DL
BUN SERPL-MCNC: 23 MG/DL
CALCIUM SERPL-MCNC: 9.6 MG/DL
CHLORIDE SERPL-SCNC: 100 MMOL/L
CO2 SERPL-SCNC: 24 MMOL/L
CREAT SERPL-MCNC: 0.75 MG/DL
CRP SERPL-MCNC: 5 MG/L
EGFR: 80 ML/MIN/1.73M2
EOSINOPHIL # BLD AUTO: 0.96 K/UL
EOSINOPHIL NFR BLD AUTO: 8.8 %
ERYTHROCYTE [SEDIMENTATION RATE] IN BLOOD BY WESTERGREN METHOD: 35 MM/HR
HCT VFR BLD CALC: 38.9 %
HGB BLD-MCNC: 12.3 G/DL
IMM GRANULOCYTES NFR BLD AUTO: 0.3 %
LYMPHOCYTES # BLD AUTO: 3.76 K/UL
LYMPHOCYTES NFR BLD AUTO: 34.5 %
MAN DIFF?: NORMAL
MCHC RBC-ENTMCNC: 29.4 PG
MCHC RBC-ENTMCNC: 31.6 GM/DL
MCV RBC AUTO: 93.1 FL
MONOCYTES # BLD AUTO: 0.62 K/UL
MONOCYTES NFR BLD AUTO: 5.7 %
NEUTROPHILS # BLD AUTO: 5.43 K/UL
NEUTROPHILS NFR BLD AUTO: 49.9 %
PLATELET # BLD AUTO: 303 K/UL
POTASSIUM SERPL-SCNC: 4.3 MMOL/L
PROT SERPL-MCNC: 6 G/DL
RBC # BLD: 4.18 M/UL
RBC # FLD: 15.3 %
SODIUM SERPL-SCNC: 139 MMOL/L
WBC # FLD AUTO: 10.89 K/UL

## 2023-05-12 ENCOUNTER — APPOINTMENT (OUTPATIENT)
Dept: RHEUMATOLOGY | Facility: CLINIC | Age: 83
End: 2023-05-12
Payer: MEDICARE

## 2023-05-12 VITALS
HEART RATE: 84 BPM | RESPIRATION RATE: 16 BRPM | TEMPERATURE: 97.1 F | DIASTOLIC BLOOD PRESSURE: 72 MMHG | SYSTOLIC BLOOD PRESSURE: 131 MMHG | OXYGEN SATURATION: 98 % | WEIGHT: 160 LBS | BODY MASS INDEX: 27.31 KG/M2 | HEIGHT: 64 IN

## 2023-05-12 PROCEDURE — 99214 OFFICE O/P EST MOD 30 MIN: CPT

## 2023-05-12 RX ORDER — ZOSTER VACCINE RECOMBINANT, ADJUVANTED 50 MCG/0.5
50 KIT INTRAMUSCULAR
Qty: 2 | Refills: 0 | Status: COMPLETED | COMMUNITY
Start: 2019-10-30 | End: 2023-05-12

## 2023-05-12 RX ORDER — NORTRIPTYLINE HYDROCHLORIDE 10 MG/1
10 CAPSULE ORAL
Qty: 60 | Refills: 1 | Status: ACTIVE | COMMUNITY
Start: 2023-05-12 | End: 1900-01-01

## 2023-05-13 NOTE — PHYSICAL EXAM
[General Appearance - In No Acute Distress] : in no acute distress [General Appearance - Alert] : alert [General Appearance - Well Nourished] : well nourished [General Appearance - Well Developed] : well developed [Sclera] : the sclera and conjunctiva were normal [Nasal Cavity] : the nasal mucosa and septum were normal [Outer Ear] : the ears and nose were normal in appearance [FreeTextEntry1] : no thrsh, no oral ukcers [Respiration, Rhythm And Depth] : normal respiratory rhythm and effort [Heart Sounds] : normal S1 and S2 [Auscultation Breath Sounds / Voice Sounds] : lungs were clear to auscultation bilaterally [Heart Sounds Gallop] : no gallops [Murmurs] : no murmurs [Edema] : there was no peripheral edema [Bowel Sounds] : normal bowel sounds [Abdomen Soft] : soft [Abdomen Tenderness] : non-tender [Musculoskeletal - Swelling] : no joint swelling seen [] : no rash [No Focal Deficits] : no focal deficits [Oriented To Time, Place, And Person] : oriented to person, place, and time [Impaired Insight] : insight and judgment were intact

## 2023-05-13 NOTE — HISTORY OF PRESENT ILLNESS
Detail Level: Detailed [de-identified] : Last seen in July, 2022 [FreeTextEntry1] :  Interval history:\par ---------------------\par on Orencia IV monthly\par on Cymbalta 90 mg a day\par under pain management care now - Dr Dudley - was started  on Nortriptyline 20 mg hs \par feels depressed\par

## 2023-05-13 NOTE — CONSULT LETTER
[Dear  ___] : Dear  [unfilled], [Courtesy Letter:] : I had the pleasure of seeing your patient, [unfilled], in my office today. [Consult Closing:] : Thank you very much for allowing me to participate in the care of this patient.  If you have any questions, please do not hesitate to contact me. [Please see my note below.] : Please see my note below. [Sincerely,] : Sincerely, [FreeTextEntry2] : Orlando Dudley MD\par Phone: [phone] [FreeTextEntry3] : Radha Lombardi MD\par Director, [facility], \par Rheumatology Division, Department of Medicine\par , \par [employer] \par at [facility]\par \par [address], [address]\par [place] [address]\par Tel: [phone]\par

## 2023-05-13 NOTE — ASSESSMENT
[FreeTextEntry1] : \par Rheumatoid arthritis on IV Orencia - in remission\par DDD/ faucet arthropathy, s/p spinal sx\par Fibromyalgia\par knee OA , s/p TKR 11/6/17\par TB screen in May2022 - neg\par \par - labs for hep \par - will try to d/c  Orencia and observe off biologic\par - continue Cymbalta 90 mg a day\par - PT\par \par RTO 4-6 months or earlier if needed. \par

## 2023-05-15 ENCOUNTER — APPOINTMENT (OUTPATIENT)
Dept: RHEUMATOLOGY | Facility: CLINIC | Age: 83
End: 2023-05-15

## 2023-05-15 LAB
ALBUMIN SERPL ELPH-MCNC: 4.1 G/DL
ALP BLD-CCNC: 115 U/L
ALT SERPL-CCNC: 24 U/L
ANION GAP SERPL CALC-SCNC: 11 MMOL/L
AST SERPL-CCNC: 29 U/L
BASOPHILS # BLD AUTO: 0.07 K/UL
BASOPHILS NFR BLD AUTO: 0.9 %
BILIRUB SERPL-MCNC: 0.3 MG/DL
BUN SERPL-MCNC: 28 MG/DL
CALCIUM SERPL-MCNC: 10.5 MG/DL
CHLORIDE SERPL-SCNC: 103 MMOL/L
CO2 SERPL-SCNC: 26 MMOL/L
CREAT SERPL-MCNC: 1.14 MG/DL
CRP SERPL-MCNC: 10 MG/L
EGFR: 48 ML/MIN/1.73M2
EOSINOPHIL # BLD AUTO: 0.33 K/UL
EOSINOPHIL NFR BLD AUTO: 4.1 %
ERYTHROCYTE [SEDIMENTATION RATE] IN BLOOD BY WESTERGREN METHOD: 28 MM/HR
GLUCOSE SERPL-MCNC: 233 MG/DL
HBV CORE IGG+IGM SER QL: NONREACTIVE
HBV CORE IGM SER QL: NONREACTIVE
HBV SURFACE AB SER QL: NONREACTIVE
HBV SURFACE AG SER QL: NONREACTIVE
HCT VFR BLD CALC: 38.4 %
HCV AB SER QL: NONREACTIVE
HCV S/CO RATIO: 0.19 S/CO
HGB BLD-MCNC: 11.7 G/DL
IMM GRANULOCYTES NFR BLD AUTO: 0.1 %
LYMPHOCYTES # BLD AUTO: 3.27 K/UL
LYMPHOCYTES NFR BLD AUTO: 40.5 %
M TB IFN-G BLD-IMP: NEGATIVE
MAN DIFF?: NORMAL
MCHC RBC-ENTMCNC: 29.3 PG
MCHC RBC-ENTMCNC: 30.5 GM/DL
MCV RBC AUTO: 96 FL
MONOCYTES # BLD AUTO: 0.54 K/UL
MONOCYTES NFR BLD AUTO: 6.7 %
NEUTROPHILS # BLD AUTO: 3.86 K/UL
NEUTROPHILS NFR BLD AUTO: 47.7 %
PLATELET # BLD AUTO: 313 K/UL
POTASSIUM SERPL-SCNC: 5 MMOL/L
PROT SERPL-MCNC: 6.1 G/DL
QUANTIFERON TB PLUS MITOGEN MINUS NIL: >10 IU/ML
QUANTIFERON TB PLUS NIL: 0.02 IU/ML
QUANTIFERON TB PLUS TB1 MINUS NIL: 0 IU/ML
QUANTIFERON TB PLUS TB2 MINUS NIL: 0 IU/ML
RBC # BLD: 4 M/UL
RBC # FLD: 15.7 %
SODIUM SERPL-SCNC: 139 MMOL/L
WBC # FLD AUTO: 8.08 K/UL

## 2023-05-17 LAB
AA PROT SER-MCNC: 2.4 UG/ML
BIOMARKER COMMENT: NORMAL
CRP SERPL-MCNC: 14 MG/L
EGF SERPL-MCNC: 28 PG/ML
FOOTNOTE: NORMAL
IL6 SERPL-MCNC: 17 PG/ML
LEPTIN SERPL-MCNC: 36 NG/ML
Lab: NORMAL
Lab: NORMAL
MMP-1 SERPL-MCNC: 6 NG/ML
MMP-3 SERPL-MCNC: 46 NG/ML
RA DAS LEVEL QL VECTRADA: NORMAL
RESISTIN SERPL-MCNC: 6.6 NG/ML
RISK OF RADIOGRAPHIC PROGRESS: 7 %
TNFRSF1A SERPL-MCNC: 2.1 NG/ML
VAP-1 SERPL-MCNC: 1.6 UG/ML
VECTRA SCORE: 46
VEGF-A SERPL-MCNC: 210 PG/ML
YKL-40 RESULT: 260 NG/ML

## 2023-05-22 LAB
14-3-3 ETA AG SER IA-MCNC: <0.2 NG/ML
CCP ANTIBODIES IGG/IGA: >250 UNITS
RHEUMATOID FACTOR IDENTRA: 236.4 UNITS/ML

## 2023-05-31 ENCOUNTER — APPOINTMENT (OUTPATIENT)
Dept: OPHTHALMOLOGY | Facility: CLINIC | Age: 83
End: 2023-05-31
Payer: MEDICARE

## 2023-05-31 ENCOUNTER — NON-APPOINTMENT (OUTPATIENT)
Age: 83
End: 2023-05-31

## 2023-05-31 PROCEDURE — 92012 INTRM OPH EXAM EST PATIENT: CPT

## 2023-06-10 ENCOUNTER — INPATIENT (INPATIENT)
Facility: HOSPITAL | Age: 83
LOS: 2 days | Discharge: HOME | DRG: 638 | End: 2023-06-13
Attending: INTERNAL MEDICINE | Admitting: INTERNAL MEDICINE
Payer: MEDICARE

## 2023-06-10 VITALS
OXYGEN SATURATION: 100 % | DIASTOLIC BLOOD PRESSURE: 84 MMHG | HEART RATE: 69 BPM | SYSTOLIC BLOOD PRESSURE: 146 MMHG | TEMPERATURE: 99 F | WEIGHT: 160.06 LBS | RESPIRATION RATE: 17 BRPM | HEIGHT: 64 IN

## 2023-06-10 DIAGNOSIS — N39.0 URINARY TRACT INFECTION, SITE NOT SPECIFIED: ICD-10-CM

## 2023-06-10 DIAGNOSIS — Z98.89 OTHER SPECIFIED POSTPROCEDURAL STATES: Chronic | ICD-10-CM

## 2023-06-10 DIAGNOSIS — H26.9 UNSPECIFIED CATARACT: Chronic | ICD-10-CM

## 2023-06-10 LAB
ALBUMIN SERPL ELPH-MCNC: 3.6 G/DL — SIGNIFICANT CHANGE UP (ref 3.3–5)
ALP SERPL-CCNC: 119 U/L — SIGNIFICANT CHANGE UP (ref 40–120)
ALT FLD-CCNC: 30 U/L — SIGNIFICANT CHANGE UP (ref 10–45)
ANION GAP SERPL CALC-SCNC: 11 MMOL/L — SIGNIFICANT CHANGE UP (ref 5–17)
APPEARANCE UR: CLEAR — SIGNIFICANT CHANGE UP
APTT BLD: 27.1 SEC — LOW (ref 27.5–35.5)
AST SERPL-CCNC: 23 U/L — SIGNIFICANT CHANGE UP (ref 10–40)
BACTERIA # UR AUTO: NEGATIVE — SIGNIFICANT CHANGE UP
BASE EXCESS BLDV CALC-SCNC: -1.7 MMOL/L — SIGNIFICANT CHANGE UP (ref -2–3)
BASOPHILS # BLD AUTO: 0.08 K/UL — SIGNIFICANT CHANGE UP (ref 0–0.2)
BASOPHILS NFR BLD AUTO: 0.8 % — SIGNIFICANT CHANGE UP (ref 0–2)
BILIRUB SERPL-MCNC: 0.3 MG/DL — SIGNIFICANT CHANGE UP (ref 0.2–1.2)
BILIRUB UR-MCNC: NEGATIVE — SIGNIFICANT CHANGE UP
BUN SERPL-MCNC: 32 MG/DL — HIGH (ref 7–23)
CA-I SERPL-SCNC: 1.26 MMOL/L — SIGNIFICANT CHANGE UP (ref 1.15–1.33)
CALCIUM SERPL-MCNC: 9.1 MG/DL — SIGNIFICANT CHANGE UP (ref 8.4–10.5)
CHLORIDE BLDV-SCNC: 97 MMOL/L — SIGNIFICANT CHANGE UP (ref 96–108)
CHLORIDE SERPL-SCNC: 98 MMOL/L — SIGNIFICANT CHANGE UP (ref 96–108)
CO2 BLDV-SCNC: 27 MMOL/L — HIGH (ref 22–26)
CO2 SERPL-SCNC: 22 MMOL/L — SIGNIFICANT CHANGE UP (ref 22–31)
COLOR SPEC: SIGNIFICANT CHANGE UP
CREAT SERPL-MCNC: 0.93 MG/DL — SIGNIFICANT CHANGE UP (ref 0.5–1.3)
DIFF PNL FLD: NEGATIVE — SIGNIFICANT CHANGE UP
EGFR: 62 ML/MIN/1.73M2 — SIGNIFICANT CHANGE UP
EOSINOPHIL # BLD AUTO: 0.18 K/UL — SIGNIFICANT CHANGE UP (ref 0–0.5)
EOSINOPHIL NFR BLD AUTO: 1.8 % — SIGNIFICANT CHANGE UP (ref 0–6)
EPI CELLS # UR: 1 /HPF — SIGNIFICANT CHANGE UP
ETHANOL SERPL-MCNC: <10 MG/DL — SIGNIFICANT CHANGE UP (ref 0–10)
GAS PNL BLDV: 130 MMOL/L — LOW (ref 136–145)
GAS PNL BLDV: SIGNIFICANT CHANGE UP
GLUCOSE BLDV-MCNC: SIGNIFICANT CHANGE UP MG/DL (ref 70–99)
GLUCOSE SERPL-MCNC: 574 MG/DL — CRITICAL HIGH (ref 70–99)
GLUCOSE UR QL: ABNORMAL
HCO3 BLDV-SCNC: 26 MMOL/L — SIGNIFICANT CHANGE UP (ref 22–29)
HCT VFR BLD CALC: 37.9 % — SIGNIFICANT CHANGE UP (ref 34.5–45)
HCT VFR BLDA CALC: 38 % — SIGNIFICANT CHANGE UP (ref 34.5–46.5)
HGB BLD CALC-MCNC: 12.5 G/DL — SIGNIFICANT CHANGE UP (ref 11.7–16.1)
HGB BLD-MCNC: 11.9 G/DL — SIGNIFICANT CHANGE UP (ref 11.5–15.5)
HYALINE CASTS # UR AUTO: 1 /LPF — SIGNIFICANT CHANGE UP (ref 0–2)
IMM GRANULOCYTES NFR BLD AUTO: 0.5 % — SIGNIFICANT CHANGE UP (ref 0–0.9)
INR BLD: 1.09 RATIO — SIGNIFICANT CHANGE UP (ref 0.88–1.16)
KETONES UR-MCNC: NEGATIVE — SIGNIFICANT CHANGE UP
LACTATE BLDV-MCNC: 2.1 MMOL/L — HIGH (ref 0.5–2)
LEUKOCYTE ESTERASE UR-ACNC: ABNORMAL
LYMPHOCYTES # BLD AUTO: 2.52 K/UL — SIGNIFICANT CHANGE UP (ref 1–3.3)
LYMPHOCYTES # BLD AUTO: 24.6 % — SIGNIFICANT CHANGE UP (ref 13–44)
MAGNESIUM SERPL-MCNC: 1.8 MG/DL — SIGNIFICANT CHANGE UP (ref 1.6–2.6)
MCHC RBC-ENTMCNC: 29 PG — SIGNIFICANT CHANGE UP (ref 27–34)
MCHC RBC-ENTMCNC: 31.4 GM/DL — LOW (ref 32–36)
MCV RBC AUTO: 92.4 FL — SIGNIFICANT CHANGE UP (ref 80–100)
MONOCYTES # BLD AUTO: 0.71 K/UL — SIGNIFICANT CHANGE UP (ref 0–0.9)
MONOCYTES NFR BLD AUTO: 6.9 % — SIGNIFICANT CHANGE UP (ref 2–14)
NEUTROPHILS # BLD AUTO: 6.71 K/UL — SIGNIFICANT CHANGE UP (ref 1.8–7.4)
NEUTROPHILS NFR BLD AUTO: 65.4 % — SIGNIFICANT CHANGE UP (ref 43–77)
NITRITE UR-MCNC: NEGATIVE — SIGNIFICANT CHANGE UP
NRBC # BLD: 0 /100 WBCS — SIGNIFICANT CHANGE UP (ref 0–0)
PCO2 BLDV: 53 MMHG — HIGH (ref 39–42)
PCP SPEC-MCNC: SIGNIFICANT CHANGE UP
PH BLDV: 7.29 — LOW (ref 7.32–7.43)
PH UR: 6 — SIGNIFICANT CHANGE UP (ref 5–8)
PHOSPHATE SERPL-MCNC: 3.3 MG/DL — SIGNIFICANT CHANGE UP (ref 2.5–4.5)
PLATELET # BLD AUTO: 277 K/UL — SIGNIFICANT CHANGE UP (ref 150–400)
PO2 BLDV: 28 MMHG — SIGNIFICANT CHANGE UP (ref 25–45)
POTASSIUM BLDV-SCNC: 4.7 MMOL/L — SIGNIFICANT CHANGE UP (ref 3.5–5.1)
POTASSIUM SERPL-MCNC: 4.5 MMOL/L — SIGNIFICANT CHANGE UP (ref 3.5–5.3)
POTASSIUM SERPL-SCNC: 4.5 MMOL/L — SIGNIFICANT CHANGE UP (ref 3.5–5.3)
PROT SERPL-MCNC: 6.2 G/DL — SIGNIFICANT CHANGE UP (ref 6–8.3)
PROT UR-MCNC: ABNORMAL
PROTHROM AB SERPL-ACNC: 12.5 SEC — SIGNIFICANT CHANGE UP (ref 10.5–13.4)
RBC # BLD: 4.1 M/UL — SIGNIFICANT CHANGE UP (ref 3.8–5.2)
RBC # FLD: 14.6 % — HIGH (ref 10.3–14.5)
RBC CASTS # UR COMP ASSIST: 2 /HPF — SIGNIFICANT CHANGE UP (ref 0–4)
SAO2 % BLDV: 39.6 % — LOW (ref 67–88)
SODIUM SERPL-SCNC: 131 MMOL/L — LOW (ref 135–145)
SP GR SPEC: 1.03 — HIGH (ref 1.01–1.02)
UROBILINOGEN FLD QL: NEGATIVE — SIGNIFICANT CHANGE UP
WBC # BLD: 10.25 K/UL — SIGNIFICANT CHANGE UP (ref 3.8–10.5)
WBC # FLD AUTO: 10.25 K/UL — SIGNIFICANT CHANGE UP (ref 3.8–10.5)
WBC UR QL: 37 /HPF — HIGH (ref 0–5)

## 2023-06-10 PROCEDURE — 70498 CT ANGIOGRAPHY NECK: CPT | Mod: 26,MA

## 2023-06-10 PROCEDURE — 99291 CRITICAL CARE FIRST HOUR: CPT

## 2023-06-10 PROCEDURE — 70496 CT ANGIOGRAPHY HEAD: CPT | Mod: 26,MA

## 2023-06-10 PROCEDURE — 70450 CT HEAD/BRAIN W/O DYE: CPT | Mod: 26,MA,XU

## 2023-06-10 PROCEDURE — 0042T: CPT | Mod: MA

## 2023-06-10 RX ORDER — DULOXETINE HYDROCHLORIDE 30 MG/1
1 CAPSULE, DELAYED RELEASE ORAL
Qty: 0 | Refills: 0 | DISCHARGE

## 2023-06-10 RX ORDER — HYDROCORTISONE 1 %
1 OINTMENT (GRAM) TOPICAL
Qty: 0 | Refills: 0 | DISCHARGE

## 2023-06-10 RX ORDER — ATORVASTATIN CALCIUM 80 MG/1
80 TABLET, FILM COATED ORAL AT BEDTIME
Refills: 0 | Status: DISCONTINUED | OUTPATIENT
Start: 2023-06-10 | End: 2023-06-13

## 2023-06-10 RX ORDER — SODIUM CHLORIDE 9 MG/ML
1000 INJECTION INTRAMUSCULAR; INTRAVENOUS; SUBCUTANEOUS ONCE
Refills: 0 | Status: COMPLETED | OUTPATIENT
Start: 2023-06-10 | End: 2023-06-10

## 2023-06-10 RX ORDER — PANTOPRAZOLE SODIUM 20 MG/1
40 TABLET, DELAYED RELEASE ORAL
Refills: 0 | Status: DISCONTINUED | OUTPATIENT
Start: 2023-06-10 | End: 2023-06-13

## 2023-06-10 RX ORDER — CEFTRIAXONE 500 MG/1
1000 INJECTION, POWDER, FOR SOLUTION INTRAMUSCULAR; INTRAVENOUS ONCE
Refills: 0 | Status: COMPLETED | OUTPATIENT
Start: 2023-06-10 | End: 2023-06-10

## 2023-06-10 RX ORDER — NORTRIPTYLINE HYDROCHLORIDE 10 MG/1
10 CAPSULE ORAL AT BEDTIME
Refills: 0 | Status: DISCONTINUED | OUTPATIENT
Start: 2023-06-10 | End: 2023-06-13

## 2023-06-10 RX ORDER — INSULIN LISPRO 100/ML
VIAL (ML) SUBCUTANEOUS
Refills: 0 | Status: DISCONTINUED | OUTPATIENT
Start: 2023-06-10 | End: 2023-06-13

## 2023-06-10 RX ORDER — LEVOTHYROXINE SODIUM 125 MCG
1 TABLET ORAL
Qty: 0 | Refills: 0 | DISCHARGE

## 2023-06-10 RX ORDER — ZOLPIDEM TARTRATE 10 MG/1
5 TABLET ORAL AT BEDTIME
Refills: 0 | Status: DISCONTINUED | OUTPATIENT
Start: 2023-06-10 | End: 2023-06-13

## 2023-06-10 RX ORDER — INSULIN GLARGINE 100 [IU]/ML
20 INJECTION, SOLUTION SUBCUTANEOUS AT BEDTIME
Refills: 0 | Status: DISCONTINUED | OUTPATIENT
Start: 2023-06-10 | End: 2023-06-11

## 2023-06-10 RX ORDER — DEXTROSE 50 % IN WATER 50 %
25 SYRINGE (ML) INTRAVENOUS ONCE
Refills: 0 | Status: DISCONTINUED | OUTPATIENT
Start: 2023-06-10 | End: 2023-06-13

## 2023-06-10 RX ORDER — DULOXETINE HYDROCHLORIDE 30 MG/1
90 CAPSULE, DELAYED RELEASE ORAL DAILY
Refills: 0 | Status: DISCONTINUED | OUTPATIENT
Start: 2023-06-10 | End: 2023-06-13

## 2023-06-10 RX ORDER — SODIUM CHLORIDE 9 MG/ML
1000 INJECTION, SOLUTION INTRAVENOUS
Refills: 0 | Status: DISCONTINUED | OUTPATIENT
Start: 2023-06-10 | End: 2023-06-13

## 2023-06-10 RX ORDER — TRAZODONE HCL 50 MG
0.5 TABLET ORAL
Qty: 0 | Refills: 0 | DISCHARGE

## 2023-06-10 RX ORDER — DEXTROSE 50 % IN WATER 50 %
12.5 SYRINGE (ML) INTRAVENOUS ONCE
Refills: 0 | Status: DISCONTINUED | OUTPATIENT
Start: 2023-06-10 | End: 2023-06-13

## 2023-06-10 RX ORDER — GABAPENTIN 400 MG/1
800 CAPSULE ORAL
Refills: 0 | Status: DISCONTINUED | OUTPATIENT
Start: 2023-06-10 | End: 2023-06-13

## 2023-06-10 RX ORDER — ZOLPIDEM TARTRATE 10 MG/1
5 TABLET ORAL AT BEDTIME
Refills: 0 | Status: DISCONTINUED | OUTPATIENT
Start: 2023-06-10 | End: 2023-06-11

## 2023-06-10 RX ORDER — METFORMIN HYDROCHLORIDE 850 MG/1
2 TABLET ORAL
Qty: 0 | Refills: 0 | DISCHARGE

## 2023-06-10 RX ORDER — DULOXETINE HYDROCHLORIDE 30 MG/1
60 CAPSULE, DELAYED RELEASE ORAL DAILY
Refills: 0 | Status: DISCONTINUED | OUTPATIENT
Start: 2023-06-10 | End: 2023-06-10

## 2023-06-10 RX ORDER — ASPIRIN/CALCIUM CARB/MAGNESIUM 324 MG
81 TABLET ORAL DAILY
Refills: 0 | Status: DISCONTINUED | OUTPATIENT
Start: 2023-06-10 | End: 2023-06-13

## 2023-06-10 RX ORDER — RIVAROXABAN 15 MG-20MG
10 KIT ORAL DAILY
Refills: 0 | Status: DISCONTINUED | OUTPATIENT
Start: 2023-06-10 | End: 2023-06-13

## 2023-06-10 RX ORDER — FOLIC ACID 0.8 MG
1 TABLET ORAL
Qty: 0 | Refills: 0 | DISCHARGE

## 2023-06-10 RX ORDER — INSULIN LISPRO 100/ML
2 VIAL (ML) SUBCUTANEOUS
Refills: 0 | Status: DISCONTINUED | OUTPATIENT
Start: 2023-06-10 | End: 2023-06-11

## 2023-06-10 RX ORDER — LEVOTHYROXINE SODIUM 125 MCG
112 TABLET ORAL DAILY
Refills: 0 | Status: DISCONTINUED | OUTPATIENT
Start: 2023-06-10 | End: 2023-06-13

## 2023-06-10 RX ORDER — DEXTROSE 50 % IN WATER 50 %
15 SYRINGE (ML) INTRAVENOUS ONCE
Refills: 0 | Status: DISCONTINUED | OUTPATIENT
Start: 2023-06-10 | End: 2023-06-13

## 2023-06-10 RX ORDER — ABATACEPT 125 MG/ML
0 INJECTION, SOLUTION SUBCUTANEOUS
Qty: 0 | Refills: 0 | DISCHARGE

## 2023-06-10 RX ORDER — METHOTREXATE 2.5 MG/1
5 TABLET ORAL
Qty: 0 | Refills: 0 | DISCHARGE

## 2023-06-10 RX ORDER — GLUCAGON INJECTION, SOLUTION 0.5 MG/.1ML
1 INJECTION, SOLUTION SUBCUTANEOUS ONCE
Refills: 0 | Status: DISCONTINUED | OUTPATIENT
Start: 2023-06-10 | End: 2023-06-13

## 2023-06-10 RX ORDER — CEFTRIAXONE 500 MG/1
1000 INJECTION, POWDER, FOR SOLUTION INTRAMUSCULAR; INTRAVENOUS EVERY 24 HOURS
Refills: 0 | Status: COMPLETED | OUTPATIENT
Start: 2023-06-10 | End: 2023-06-12

## 2023-06-10 RX ADMIN — SODIUM CHLORIDE 1000 MILLILITER(S): 9 INJECTION INTRAMUSCULAR; INTRAVENOUS; SUBCUTANEOUS at 14:26

## 2023-06-10 RX ADMIN — CEFTRIAXONE 100 MILLIGRAM(S): 500 INJECTION, POWDER, FOR SOLUTION INTRAMUSCULAR; INTRAVENOUS at 23:00

## 2023-06-10 RX ADMIN — SODIUM CHLORIDE 1000 MILLILITER(S): 9 INJECTION INTRAMUSCULAR; INTRAVENOUS; SUBCUTANEOUS at 12:54

## 2023-06-10 RX ADMIN — CEFTRIAXONE 100 MILLIGRAM(S): 500 INJECTION, POWDER, FOR SOLUTION INTRAMUSCULAR; INTRAVENOUS at 15:23

## 2023-06-10 NOTE — H&P ADULT - HISTORY OF PRESENT ILLNESS
83-year-old female with a past medical history of hypertension, diabetes on metformin who presents emergency department complaining of dizziness  arriving via EMS.  Patient states that she was golfing with her friends today at 9 AM when she suddenly felt dizzy and had double vision.  Patient states that she felt like the room was spinning and she had blurry vision.  Patient states that the symptoms have since subsided.  Patient also believes that she has a UTI, has not seen a doctor for this. Patient arrived with lethargy and finger stick of 500.

## 2023-06-10 NOTE — ED ADULT NURSE REASSESSMENT NOTE - NS ED NURSE REASSESS COMMENT FT1
Pt AAOx4, resp nonlabored, resting comfortably in bed. Food tray provided.  Patient aware of being admitted to the hospital. Pt awaiting for a bed assignment. pt updated plan of care. Comfort care & safety measures continued,  IV site looks clean & dry, no signs of infiltration noted. Bed in the lowest position. Call bell within reach. Continue to monitor.

## 2023-06-10 NOTE — ED PROVIDER NOTE - CLINICAL SUMMARY MEDICAL DECISION MAKING FREE TEXT BOX
Patient presents emergency department complaining of dizziness.  Patient was activated for code stroke.  Patient evaluated neurology.  Patient code stroke evaluation is negative at this time.  Patient neurology team recommending MRI and admission.  Patient was also found to have UTI.  Patient will be admitted for UTI treatment and further work-up of code stroke.

## 2023-06-10 NOTE — ED ADULT TRIAGE NOTE - BP NONINVASIVE SYSTOLIC (MM HG)
[place] [facility]   [address]   [place] [address] [phone]      Leon Borrero :1958 MRN:[id number]    2021 Time Session Began: 2:30pm  Time Session Ended: 3:13pm    Due to COVID-19 precautions, this visit was performed via live interactive two-way Video visit with patient's verbal consent.   Clinician Location:Home.  Patient Location: Home.  Verified patient identity:  [x] Yes    Session Type: 45 Minute Therapy (50317)  Others Present: N/A    Suicide/Homicide/Violence Ideation: No  If Yes, explain: N/A    Need for Community Resources Assessed: No  Resources Needed: No  If Yes, what resources: N/A    Current Outpatient Medications   Medication Sig   • clopidogrel (PLAVIX) 75 MG tablet Take 1 tablet by mouth daily.   • omeprazole (PriLOSEC) 40 MG capsule TAKE 1 CAPSULE BY MOUTH DAILY   • buPROPion XL (WELLBUTRIN XL) 300 MG 24 hr tablet Take 1 tablet by mouth daily.   • predniSONE (DELTASONE) 20 MG tablet Take 20 mg by mouth daily. Pt to adjust 20-40 mg based on his resp status   • metoPROLOL succinate (TOPROL-XL) 25 MG 24 hr tablet Take 1 tablet by mouth 2 times daily.   • furosemide (Lasix) 40 MG tablet Take 1 tablet by mouth daily. Do not start before 2020.   • DULoxetine (CYMBALTA) 20 MG capsule TAKE 2 CAPSULES BY MOUTH EVERY DAY   • azithromycin (ZITHROMAX) 500 MG tablet Take 1 tablet by mouth 3 days a week. Monday, Wednesday and Friday   • Eliquis 5 MG Tab Take 5 mg by mouth 2 times daily.   • ipratropium-albuterol (DUONEB) 0.5-2.5 (3) MG/3ML nebulizer solution Take 3 mLs by nebulization every 6 hours as needed.    • oxyCODONE-acetaminophen (PERCOCET) 5-325 MG per tablet Take 1 tablet by mouth 3 times daily.   • Insulin Lispro, 1 Unit Dial, (HumaLOG KwikPen) 100 UNIT/ML pen-injector Inject 25 Units into the skin 3 times daily (with meals). PRN BS over 150   • magnesium oxide 400 (241.3 Mg) MG Tab Take 1 tablet by  mouth daily.   • potassium CHLORIDE (KLOR-CON M) 20 MEQ tawanna ER tablet Take 1 tablet by mouth daily.   • atorvastatin (LIPITOR) 80 MG tablet Take 1 tablet by mouth daily.   • acetaminophen (TYLENOL) 500 MG tablet Take 1,000 mg by mouth every 4 hours as needed for Pain.    • tadalafil (CIALIS) 5 MG tablet Take 1 tablet by mouth daily.   • zileuton 600 MG extended-release tablet Take 600 mg by mouth 2 times daily.   • albuterol (PROAIR HFA) 108 (90 Base) MCG/ACT inhaler Inhale 2 puffs into the lungs every 4 hours as needed for Shortness of Breath or Wheezing.   • tamsulosin (FLOMAX) 0.4 MG Cap TAKE 1 CAPSULE BY MOUTH DAILY AFTER A MEAL   • fluticasone-umeclidin-vilanterol (TRELEGY ELLIPTA) 100-62.5-25 MCG/INH inhaler Inhale 1 puff into the lungs daily. Hold unti lf/u with dr. ervin.   • TRESIBA FLEXTOUCH 200 UNIT/ML pen-injector Inject 50 Units into the skin at bedtime.   • amphetamine-dextroamphetamine XR (ADDERALL XR) 15 MG 24 hr capsule Take 1 capsule by mouth daily.   • gabapentin (NEURONTIN) 300 MG capsule Take 600 mg by mouth nightly.    • roflumilast (DALIRESP) 500 MCG Tab Take 1 tablet by mouth daily.   • testosterone cypionate (DEPO-TESTOSTERONE) 200 MG/ML injectable solution INJECT 300 MG IM Q 2 WEEKS   • empagliflozin (JARDIANCE) 25 MG tablet Take 25 mg by mouth daily (before breakfast).   • exenatide ER (BYDUREON) 2 MG pen-injector Inject 1 syringe SQ once per week   • Respiratory Therapy Supplies (NEBULIZER) Device Inhale 1 inhalation into the lungs 4 times daily.   • albuterol (VENTOLIN) (2.5 MG/3ML) 0.083% nebulizer solution Take 3 mLs by nebulization 4 times daily.   • DISPENSE Please dispense 14 fr coude male long self catheters to use tid for diagnosis of urinary retention R33.9, length of need: indefinite (Patient not taking: Reported on 11/24/2020)     No current facility-administered medications for this visit.        Change in Medication(s) Reported: No    If Yes, explain: N/A    Patient/Family  146 Education Provided: Yes  Patient/Family Displays Understanding: Yes  If No, explain: N/A    Chief complaint in patient's own words: \"Tired.\"    Progress Note containing chief complaint and symptoms/problems related to the complaint:    Data: Leon is a 62-year-old male who was contacted by telephone for follow-up in regards to increased symptoms of depression and anxiety. Leon overall has continued to notice an improvement in mood but does note that it continues to fluctuate. Leon overall reported he has been doing \"okay\" and has been getting things done around his house. Leon has been working on getting the upstairs of his house ready to rent out and only has a couple tasks left to complete. Leon did report needing to have a heart surgery in the near future and does believe the challenge with his heart negatively affects his energy. Leon has continued to pace activities. Leon relayed he also has to have another eye surgery as he has still been having difficulties. Leon has noticed an improvement in sleep since starting a sleep medication and is able to fall back to sleep if he wakes up in the middle of the night. Leon has been working on improving his diet and has been cutting back on portions. Leon has noticed that he has been smiling and laughing more than he had been. Leon continues to pray, meditate, and utilizes positive affirmations.       Action of the provider: Provider actively listened and offered support while helping Leon process his thought and feelings. This provider continued to provide education on depression, anxiety, radical acceptance, and cognitive behavioral therapy in regards to how our thoughts can affect our mood and our behaviors. This provider continued to provide education on behavioral activation, the benefits of partaking in pleasurable activities, and pacing activities. This provider spent time discussing the benefits of deep breathing. Intervention:  Supportive, Behavioral, Cognitive,  Insight     Response of patient: Leon was alert and oriented x4. Patient presented as motivated. Patient presented as calm and cooperative. Mood sounded dysphoric with congruent affect. Speech was slow in rate, tone, and volume. Motor activity was unremarkable. Thought process was future oriented and goal directed. Patient denied any suicidal ideation, homicidal ideation, or self-harm ideation. Leon presents with insight into his situation and has been trying to take things one day at a time.      Plan: Leon will return in 2 weeks or sooner if needed. Leon plans to continue utilizing coping skills and continue pacing activities.     Diagnosis: Major Depression Recurrent : 1 Mild    Treatment Plan:  Unchanged     Discharge Plan: N/A     Next Appointment: 2 Weeks  Lisandra Hawkins LCSW

## 2023-06-10 NOTE — CONSULT NOTE ADULT - SUBJECTIVE AND OBJECTIVE BOX
Neurology - Consult Note    -  Spectra: [phone] ([facility]), [phone] ([facility])  -    HPI: Patient DEBBIE NUÑEZ is a 83y (1940) wo/man with a PMHx significant for ***      Review of Systems:  INCOMPLETE   CONSTITUTIONAL: No fevers or chills  EYES AND ENT: No visual changes or no throat pain   NECK: No pain or stiffness  RESPIRATORY: No hemoptysis or shortness of breath  CARDIOVASCULAR: No chest pain or palpitations  GASTROINTESTINAL: No melena or hematochezia  GENITOURINARY: No dysuria or hematuria  NEUROLOGICAL: +As stated in HPI above  SKIN: No itching, burning, rashes, or lesions   All other review of systems is negative unless indicated above.    Allergies:  Remicade (Anaphylaxis)      PMHx/PSHx/Family Hx: As above, otherwise see below   Sjoegren syndrome    Rheumatoid arthritis    Osteopenia    Biliary cirrhosis    Nephrolithiasis    Type 2 diabetes mellitus    Hypothyroid        Social Hx:  No current use of tobacco, alcohol, or illicit drugs  Lives with ***    Medications:  MEDICATIONS  (STANDING):    MEDICATIONS  (PRN):      Vitals:  T(C): 37 (06-10-23 @ 12:28), Max: 37 (06-10-23 @ 12:28)  HR: 69 (06-10-23 @ 12:28) (69 - 69)  BP: 146/84 (06-10-23 @ 12:28) (146/84 - 146/84)  RR: 17 (06-10-23 @ 12:28) (17 - 17)  SpO2: 100% (06-10-23 @ 12:28) (100% - 100%)    Physical Examination: INCOMPLETE  General - NAD    Neurologic Exam:  Mental status - Awake, Alert, Oriented to person, place, and time. Speech fluent, repetition and naming intact. Follows simple and complex commands. Attention/concentration, recent and remote memory (including registration and recall), and fund of knowledge intact    Cranial nerves - PERRLA, VFF, EOMI, face sensation (V1-V3) intact b/l, facial strength intact without asymmetry b/l, hearing intact b/l, palate with symmetric elevation, trapezius OR sternocleidomastiod 5/5 strength b/l, tongue midline on protrusion with full lateral movement    Motor - Normal bulk and tone throughout. No pronator drift.  Strength testing            Deltoid      Biceps      Triceps              R            5                 5               5                     5                             L             5                 5               5                     5                                           Hip Flexion     Knee Flexion    Knee Extension    Dorsiflexion    Plantar Flexion  R              5                           5                       5                           5                            5                            L              5                           5                        5                           5                            5                              Sensation - Light touch/temperature OR pain/vibration intact throughout    DTR's -             Biceps      Triceps     Brachioradialis      Patellar    Ankle    Toes/plantar response  R             2+             2+                  2+                       2+            2+                 Down  L              2+             2+                 2+                        2+           2+                 Down    Coordination - Finger to Nose intact b/l. No tremors appreciated    Gait and station - Normal casual gait. Romberg (-)    Labs:                        11.9   10.25 )-----------( 277      ( 10 Sylvester 2023 13:03 )             37.9     CAPILLARY BLOOD GLUCOSE    POCT Blood Glucose.: 525 mg/dL (10 Sylvester 2023 12:42)    Radiology:     Neurology - Consult Note    -  Spectra: [phone] ([facility]), [phone] ([facility])  -    HPI: Patient DEBBIE NUÑEZ is a 83y (1940) woman with a PMHx significant for RA, DM, HTN, HLD presenting with AMS. Code stroke called 1:06pm. LKW 8am when eating breakfast. Patient was golfing this morning when she apparently became dizzy and altered (per EMS). Patient reports she had double vision, now resolved. NIHSS 2 for slight LUE drift, partial field cut. Patient was difficult to follow commands due to lethargy and seemed minorly confused. Upon re-examination patient intermittently continues to fall asleep. She says she feels very tired. Unprompted pt states "I think I'm having a UTI". She admits to increased frequency, urgency, and possibly an episode of incontinence. When asked if this feels similar to prior UTIs she says yes.     Review of Systems:     Allergies:  Remicade (Anaphylaxis)    PMHx/PSHx/Family Hx: As above, otherwise see below   Sjoegren syndrome  Rheumatoid arthritis  Osteopenia  Biliary cirrhosis  Nephrolithiasis  Type 2 diabetes mellitus  Hypothyroid    Social Hx:  No current use of tobacco, alcohol, or illicit drugs    Medications:  MEDICATIONS  (STANDING):    MEDICATIONS  (PRN):      Vitals:  T(C): 37 (06-10-23 @ 12:28), Max: 37 (06-10-23 @ 12:28)  HR: 69 (06-10-23 @ 12:28) (69 - 69)  BP: 146/84 (06-10-23 @ 12:28) (146/84 - 146/84)  RR: 17 (06-10-23 @ 12:28) (17 - 17)  SpO2: 100% (06-10-23 @ 12:28) (100% - 100%)    Physical Examination: INCOMPLETE  General - NAD    Neurologic Exam:  Mental status - Awake, Alert, Oriented to person, place, and time. Speech fluent, repetition and naming intact. Follows simple and complex commands. Attention/concentration, recent and remote memory (including registration and recall), and fund of knowledge intact    Cranial nerves - PERRLA, VFF, EOMI, face sensation (V1-V3) intact b/l, facial strength intact without asymmetry b/l, hearing intact b/l, palate with symmetric elevation, trapezius OR sternocleidomastiod 5/5 strength b/l, tongue midline on protrusion with full lateral movement    Motor - Normal bulk and tone throughout. No pronator drift.  Strength testing            Deltoid      Biceps      Triceps              R            5                 5               5                     5                             L             5                 5               5                     5                                           Hip Flexion     Knee Flexion    Knee Extension    Dorsiflexion    Plantar Flexion  R              5                           5                       5                           5                            5                            L              5                           5                        5                           5                            5                              Sensation - Light touch/temperature OR pain/vibration intact throughout    DTR's -             Biceps      Triceps     Brachioradialis      Patellar    Ankle    Toes/plantar response  R             2+             2+                  2+                       2+            2+                 Down  L              2+             2+                 2+                        2+           2+                 Down    Coordination - Finger to Nose intact b/l. No tremors appreciated    Gait and station - Normal casual gait. Romberg (-)    Labs:                        11.9   10.25 )-----------( 277      ( 10 Sylvester 2023 13:03 )             37.9     CAPILLARY BLOOD GLUCOSE    POCT Blood Glucose.: 525 mg/dL (10 Sylvester 2023 12:42)    Radiology:

## 2023-06-10 NOTE — ED PROVIDER NOTE - PROGRESS NOTE DETAILS
pt assessed is sleepy w/ nystagmus horizontal w/ slow to respond to questions, reports that she has dizziness that started while golfing, no hx of kidney abnormalities, no allergy to iv contrast, made a code stroke for dizziness, reports last normal was 945 AM, pt made a code stroke

## 2023-06-10 NOTE — ED ADULT NURSE NOTE - OBJECTIVE STATEMENT
84 y/o female with PMH of Rheumatoid arthritis, Osteopenia, Biliary cirrhosis, Nephrolithiasis, Type 2 diabetes mellitus arrives to the ER by ambulance.  Pt reports having a sudden onset of double vision, dizziness while playing golf today. Pt feeling lethargic since the morning.  Pt denies SOB, chest pain, N/V/D.  At arrival  pt is complaints of feeling weak, not willing to provide more details of events leading to the symptoms.  A&Ox4, speaking coherently, following commands. Airway is patent, breathing spontaneously and unlabored. Skin is dry, warm. Abdomen is soft, no distended, no tender. Full ROM in all extremities.  Patient undressed and placed into gown, FS  518. 1 L NC administered. 20G placed RAC, blood sent to the lab. Pt placed on continuous cardiac monitor, NSR noted. code stroke called at 1303, pt transported to CT scan. Comfort and safety provided, side rails up with bed locked and in lowest position for safety. call bell within reach. [place] provided. will continue to reassess.

## 2023-06-10 NOTE — ED PROVIDER NOTE - OBJECTIVE STATEMENT
Patient is a 83-year-old female with a past medical history of hypertension, diabetes on metformin who presents emergency department complaining of dizziness  arriving via EMS.  Patient states that she was golfing with her friends today at 9 AM when she suddenly felt dizzy and had double vision.  Patient states that she felt like the room was spinning and she had blurry vision.  Patient states that the symptoms have since subsided.  Patient also believes that she has a UTI, has not seen a doctor for this. Patient arrived with lethargy and finger stick of 500. .

## 2023-06-10 NOTE — H&P ADULT - NSHPPHYSICALEXAM_GEN_ALL_CORE
T(C): 36.7 (06-10-23 @ 19:30), Max: 37 (06-10-23 @ 12:28)  HR: 72 (06-10-23 @ 19:30) (69 - 80)  BP: 160/72 (06-10-23 @ 19:30) (146/84 - 190/87)  RR: 19 (06-10-23 @ 19:30) (15 - 20)  SpO2: 95% (06-10-23 @ 19:30) (94% - 100%)    PHYSICAL EXAM:  GENERAL: NAD, well-developed  HEAD:  Atraumatic, Normocephalic  EYES: EOMI, PERRLA, conjunctiva and sclera clear  NECK: Supple, No JVD  CHEST/LUNG: Clear to auscultation bilaterally; No wheeze  HEART: Regular rate and rhythm; No murmurs, rubs, or gallops  ABDOMEN: Soft, Nontender, Nondistended; Bowel sounds present  EXTREMITIES:  2+ Peripheral Pulses, No clubbing, cyanosis, or edema  PSYCH: AAOx3  NEUROLOGY: non-focal  SKIN: No rashes or lesions

## 2023-06-10 NOTE — ED ADULT NURSE REASSESSMENT NOTE - NS ED NURSE REASSESS COMMENT FT1
report received from RN YANDEL, RADHA, pt resting comfortably and no acute distress noted. neuro intact, safety and comfort measures maintained, pending dispo

## 2023-06-10 NOTE — H&P ADULT - ASSESSMENT
83-year-old female with a past medical history of hypertension, diabetes on metformin who presents emergency department complaining of dizziness  arriving via EMS.  Patient states that she was golfing with her friends today at 9 AM when she suddenly felt dizzy and had double vision.  Patient states that she felt like the room was spinning and she had blurry vision.  Patient states that the symptoms have since subsided.  Patient also believes that she has a UTI, has not seen a doctor for this. Patient arrived with lethargy and finger stick of 500.    A/P  recurrent vertigo prob 2/2 UTI and uncontrolled DM  cont CTX, UCx sent  place on Insulin on aliding scale and Lantus 20 u at hs, endo called  CTH neg, get MR brain  dvt ppx po xarelto

## 2023-06-10 NOTE — CONSULT NOTE ADULT - ATTENDING COMMENTS
Date of service: 6/11/2023    84 yo female w/ hx of RA, DM, HTN, HLD, p/w as a code stroke for altered mental status. Pt was reportedly golfing earlier in the day 6/10, when she became dizzy and altered and brought to ED by EMS. On arrival to ED, patient was lethargic, had difficulty following some commands and seemed a little confused. Labs significant for hyperglycemia (574) and + UTI. Neurology consulted to transient AMS. CT head w/ ? age indeterminate right anterior pontine lacunar infarct.     additional hx- Pt reports a hx of "TIA" several years ago- "passed out". Also reports a seizure event at a store when she was younger, never sought medical attention for this. She is currently followed by neurologist, Dr Jordan, for mild cognitive difficulties and episodes of disorientation. She was referred to neuropsych and is likely planned for a PET scan as outpatient. She reports she had a MRI brain 3-4 weeks ago- does not know results.     This AM, pt is AO x 4. There is end gaze nystagmus on exam, otherwise neurological exam non focal.   She reports feeling better this AM.     Imp:- # Transient altered mental status- likely encephalopathy in setting of UTI and hyperglycemia. Low concern for subclinical seizure or cerebrovascular ischemic event.            # Age indeterminate right anterior pontine lacunar infarct- ? artifact vs old stroke    Rec:   [] MRI brain without contrast for further evaluation of ? pontine stroke  [] Routine EEG to rule out seizures  [] Rest as per primary team

## 2023-06-10 NOTE — PATIENT PROFILE ADULT - FALL HARM RISK - HARM RISK INTERVENTIONS

## 2023-06-10 NOTE — ED PROVIDER NOTE - NSICDXPASTMEDICALHX_GEN_ALL_CORE_FT
PAST MEDICAL HISTORY:  Biliary cirrhosis     Hypothyroid     Nephrolithiasis     Osteopenia     Rheumatoid arthritis     Sjoegren syndrome     Type 2 diabetes mellitus

## 2023-06-10 NOTE — CONSULT NOTE ADULT - ASSESSMENT
Assessment: 83y (1940) woman with a PMHx significant for RA, DM, HTN, HLD presenting with AMS. Code stroke called 1:06pm. LKW 8am when eating breakfast. Patient was golfing this morning when she apparently became dizzy and altered (per EMS). Patient reports she had double vision, now resolved. NIHSS 2 for slight LUE drift, partial field cut. Patient was difficult to follow commands due to lethargy and seemed minorly confused. Upon re-examination patient intermittently continues to fall asleep. She says she feels very tired. Unprompted pt states "I think I'm having a UTI". She admits to increased frequency, urgency, and possibly an episode of incontinence. When asked if this feels similar to prior UTIs she says yes. Glucose significant for 574. UA significant for LE (large), WBC (37), glucose 1000. 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 Likely chronic to my eye. Very unlikely symptoms are due to this chronic infarct. Not a tenecteplase candidate due to out of the window. Not a MT candidate due to no LVO    LKW 8am (6/10)  NIHSS 2   mRS 0    Impression: AMS, lethargy, urinary symptoms 2/2 UTI & hyperglycemia      Plan  [] follow up CTA h/n & perfusion report.  [] can start aspirin 81mg daily  [] A1c, lipid panel  [] MRI brain without contrast to assess infarct  [] UTI & hyperglycemia management per primary team    Case discussed with Neurology attending, to be seen in am    Assessment: 83y (1940) woman with a PMHx significant for RA, DM, HTN, HLD presenting with AMS. Code stroke called 1:06pm. LKW 8am when eating breakfast. Patient was golfing this morning when she apparently became dizzy and altered (per EMS). Patient reports she had double vision, now resolved. NIHSS 2 for slight LUE drift, partial field cut. Patient was difficult to follow commands due to lethargy and seemed minorly confused. Upon re-examination patient intermittently continues to fall asleep. She says she feels very tired. Unprompted pt states "I think I'm having a UTI". She admits to increased frequency, urgency, and possibly an episode of incontinence. When asked if this feels similar to prior UTIs she says yes. Glucose significant for 574. UA significant for LE (large), WBC (37), glucose 1000. 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 Likely chronic to my eye. Very unlikely symptoms are due to this chronic infarct. Not a tenecteplase candidate due to out of the window. Not a MT candidate due to no LVO    LKW 8am (6/10)  NIHSS 2   mRS 0    Impression: AMS, lethargy, urinary symptoms 2/2 UTI & hyperglycemia      Plan  [] follow up CTA h/n & perfusion report.  [] will consider starting anti-platelets if MRI shows infarct  [] A1c, lipid panel  [] MRI brain without contrast to assess infarct  [] UTI & hyperglycemia management per primary team    Case discussed with Neurology attending, to be seen in am

## 2023-06-10 NOTE — ED PROVIDER NOTE - ATTENDING CONTRIBUTION TO CARE
83 F w/ pmh of HTN DM, presents to the ER w/ dizziness and lethargy, pt last normal at 945 AM, pt w/ no head strike no LOC, +nausea, pt slow to respond to questions.   On exam, pt is awake and alert in no distress, has clear lungs soft abdomen I have reviewed the triage vital signs. Const: well-developed, pt nontoxic appearing, Eyes: no conjunctival injection and no scleral icterus ENMT: Moist mucus membranes, CVS: +S1/S2, radial/DP pulse 2+ bilaterally RESP: Unlabored respiratory effort, Clear to auscultation bilaterally GI: Nontender/Nondistended soft abdomen, no CVA tenderness MSK: Extremities w/o deformity or ttp, Psych: Awake, Alert, & Orientedx3;  Appropriate mood and affect, cooperative Neuro: 5/5 bilateral elbow flexion/extension, 5/5 bilateral wrist flexion/extension, 5/5 bilateral hand , 5/5 bilateral hip flexion/extension, 5/5 bilateral knee flexion/extension, 5/5 bilateral dorsiflexion/plantarflexion, intact sensation in the bilateral arms and legs to light touch, normal finger to nose bilaterally w/ no dysmetria, EOMI, CN2-12 intact, pupils are 4 mm and reactive bilaterally  Plan for labs imaging and reassessment - dispo pending results, made a code stroke, 83 F w/ pmh of HTN DM, presents to the ER w/ dizziness and lethargy, pt last normal at 945 AM, pt w/ no head strike no LOC, +nausea, pt slow to respond to questions.   On exam, pt is awake and alert in no distress, has clear lungs soft abdomen I have reviewed the triage vital signs. Const: well-developed, pt nontoxic appearing, Eyes: no conjunctival injection and no scleral icterus ENMT: Moist mucus membranes, CVS: +S1/S2, radial/DP pulse 2+ bilaterally RESP: Unlabored respiratory effort, Clear to auscultation bilaterally GI: Nontender/Nondistended soft abdomen, no CVA tenderness MSK: Extremities w/o deformity or ttp, Psych: slow to respond to questions, cooperative Neuro: horizontal nystagmus w/ lateral gaze, moving arms and legs, intact sensation in the bilateral arms and legs to light touch, unable to perform finger to nose bilaterally, EOMI, CN2-12 intact, pupils are 4 mm and reactive bilaterally  Plan for labs imaging and reassessment - dispo pending results, made a code stroke,  due to acute onset dizziness and lethargy

## 2023-06-11 DIAGNOSIS — E03.9 HYPOTHYROIDISM, UNSPECIFIED: ICD-10-CM

## 2023-06-11 DIAGNOSIS — E11.9 TYPE 2 DIABETES MELLITUS WITHOUT COMPLICATIONS: ICD-10-CM

## 2023-06-11 DIAGNOSIS — I10 ESSENTIAL (PRIMARY) HYPERTENSION: ICD-10-CM

## 2023-06-11 LAB
A1C WITH ESTIMATED AVERAGE GLUCOSE RESULT: 11.5 % — HIGH (ref 4–5.6)
CULTURE RESULTS: SIGNIFICANT CHANGE UP
ESTIMATED AVERAGE GLUCOSE: 283 MG/DL — HIGH (ref 68–114)
GLUCOSE BLDC GLUCOMTR-MCNC: 165 MG/DL — HIGH (ref 70–99)
GLUCOSE BLDC GLUCOMTR-MCNC: 222 MG/DL — HIGH (ref 70–99)
GLUCOSE BLDC GLUCOMTR-MCNC: 262 MG/DL — HIGH (ref 70–99)
GLUCOSE BLDC GLUCOMTR-MCNC: 275 MG/DL — HIGH (ref 70–99)
SPECIMEN SOURCE: SIGNIFICANT CHANGE UP

## 2023-06-11 PROCEDURE — 99223 1ST HOSP IP/OBS HIGH 75: CPT

## 2023-06-11 RX ORDER — HALOPERIDOL DECANOATE 100 MG/ML
2.5 INJECTION INTRAMUSCULAR ONCE
Refills: 0 | Status: DISCONTINUED | OUTPATIENT
Start: 2023-06-11 | End: 2023-06-11

## 2023-06-11 RX ORDER — LISINOPRIL 2.5 MG/1
5 TABLET ORAL DAILY
Refills: 0 | Status: DISCONTINUED | OUTPATIENT
Start: 2023-06-11 | End: 2023-06-13

## 2023-06-11 RX ORDER — HYDRALAZINE HCL 50 MG
10 TABLET ORAL ONCE
Refills: 0 | Status: COMPLETED | OUTPATIENT
Start: 2023-06-11 | End: 2023-06-11

## 2023-06-11 RX ORDER — INSULIN LISPRO 100/ML
5 VIAL (ML) SUBCUTANEOUS
Refills: 0 | Status: DISCONTINUED | OUTPATIENT
Start: 2023-06-11 | End: 2023-06-12

## 2023-06-11 RX ORDER — INSULIN GLARGINE 100 [IU]/ML
12 INJECTION, SOLUTION SUBCUTANEOUS AT BEDTIME
Refills: 0 | Status: DISCONTINUED | OUTPATIENT
Start: 2023-06-11 | End: 2023-06-12

## 2023-06-11 RX ADMIN — GABAPENTIN 800 MILLIGRAM(S): 400 CAPSULE ORAL at 05:44

## 2023-06-11 RX ADMIN — Medication 3: at 08:59

## 2023-06-11 RX ADMIN — CEFTRIAXONE 100 MILLIGRAM(S): 500 INJECTION, POWDER, FOR SOLUTION INTRAMUSCULAR; INTRAVENOUS at 23:08

## 2023-06-11 RX ADMIN — Medication 2 UNIT(S): at 08:59

## 2023-06-11 RX ADMIN — Medication 3: at 12:17

## 2023-06-11 RX ADMIN — Medication 5 UNIT(S): at 17:47

## 2023-06-11 RX ADMIN — Medication 112 MICROGRAM(S): at 05:44

## 2023-06-11 RX ADMIN — Medication 2: at 17:46

## 2023-06-11 RX ADMIN — RIVAROXABAN 10 MILLIGRAM(S): KIT at 11:28

## 2023-06-11 RX ADMIN — PANTOPRAZOLE SODIUM 40 MILLIGRAM(S): 20 TABLET, DELAYED RELEASE ORAL at 05:44

## 2023-06-11 RX ADMIN — Medication 10 MILLIGRAM(S): at 10:07

## 2023-06-11 RX ADMIN — DULOXETINE HYDROCHLORIDE 90 MILLIGRAM(S): 30 CAPSULE, DELAYED RELEASE ORAL at 11:29

## 2023-06-11 RX ADMIN — INSULIN GLARGINE 12 UNIT(S): 100 INJECTION, SOLUTION SUBCUTANEOUS at 21:31

## 2023-06-11 RX ADMIN — NORTRIPTYLINE HYDROCHLORIDE 10 MILLIGRAM(S): 10 CAPSULE ORAL at 21:24

## 2023-06-11 RX ADMIN — GABAPENTIN 800 MILLIGRAM(S): 400 CAPSULE ORAL at 17:38

## 2023-06-11 RX ADMIN — Medication 2 UNIT(S): at 12:17

## 2023-06-11 RX ADMIN — LISINOPRIL 5 MILLIGRAM(S): 2.5 TABLET ORAL at 17:37

## 2023-06-11 RX ADMIN — Medication 81 MILLIGRAM(S): at 11:28

## 2023-06-11 NOTE — CONSULT NOTE ADULT - ASSESSMENT
84 yo female w/ hx of RA, DM, HTN, HLD, p/w as a code stroke for altered mental status. Now with UTI.   Assessment  DMT2: 83y Female with DM T2 with hyperglycemia, A1C 11.5% , was on oral meds at home, now on basal bolus insulin with coverage, blood sugars running high, eating meals.  UTI: on abx, stable, monitored. Urine cultures, glucosuria   Hypothyroidism: On Synthroid 112  mcg po daily, compliant with Synthroid intake, asymptomatic. Fulll TFT's pending.   HTN: on antihypertensive medications, monitored, asymptomatic.    Discussed plan and management wit Dr Gemma Menendez MD  Cell: [phone]  Office: [phone]

## 2023-06-11 NOTE — PROGRESS NOTE ADULT - ASSESSMENT
83-year-old female with a past medical history of hypertension, diabetes on metformin who presents emergency department complaining of dizziness  arriving via EMS.  Patient states that she was golfing with her friends today at 9 AM when she suddenly felt dizzy and had double vision.  Patient states that she felt like the room was spinning and she had blurry vision.  Patient states that the symptoms have since subsided.  Patient also believes that she has a UTI, has not seen a doctor for this. Patient arrived with lethargy and finger stick of 500.    A/P  recurrent vertigo prob 2/2 UTI and uncontrolled DM  cont CTX, UCx sent  cont  Insulin on sliding scale and Lantus 20 u at hs, endo called  HA1C : 11.5, ptn prob will need to transition to Insulin  CTH neg, awaiting MR brain  dvt ppx po xarelto

## 2023-06-11 NOTE — CONSULT NOTE ADULT - SUBJECTIVE AND OBJECTIVE BOX
HPI:   83-year-old female with a past medical history of hypertension, diabetes on metformin who presents emergency department complaining of dizziness  arriving via EMS.  Patient states that she was golfing with her friends today at 9 AM when she suddenly felt dizzy and had double vision.  Patient states that she felt like the room was spinning and she had blurry vision.  Patient states that the symptoms have since subsided.  Patient also believes that she has a UTI, has not seen a doctor for this. Patient arrived with lethargy and finger stick of 500. (10 Sylvester 2023 22:21)      Patient has history of diabetes, A1C 11.5 %, on home oral DM meds at home.       PAST MEDICAL & SURGICAL HISTORY:  Sjoegren syndrome      Rheumatoid arthritis      Osteopenia      Biliary cirrhosis      Nephrolithiasis      Type 2 diabetes mellitus      Hypothyroid      Status post laminectomy      Cataract  left eye cataract surgery 3/3/2014          FAMILY HISTORY:  No pertinent family history in first degree relatives        Social History:            HOME MEDICATIONS:  Home Medications:  calcipotriene 0.005% topical cream: Apply topically to affected area once a day as needed (10 Sylvester 2023 19:58)  celecoxib 200 mg oral capsule: 1 cap(s) orally once a day as needed (10 Sylvester 2023 19:58)  DULoxetine 30 mg oral delayed release capsule: 1 cap(s) orally once a day - total daily dose 90mg (10 Sylvester 2023 19:58)  DULoxetine 60 mg oral delayed release capsule: 1 cap(s) orally once a day - total daily dose 90mg (10 Sylvester 2023 19:58)  gabapentin 800 mg oral tablet: 1 tab(s) orally 2 times a day (10 Sylvester 2023 19:58)  levothyroxine 112 mcg (0.112 mg) oral tablet: 1 tab(s) orally once a day (10 Sylvester 2023 19:58)  losartan 25 mg oral tablet: 1 tab(s) orally once a day (10 Sylvester 2023 19:58)  metFORMIN 500 mg oral tablet, extended release: 2 tab(s) orally once a day (in the evening) (10 Sylvester 2023 19:58)  Kyaw 128 ophthalmic ointment: Apply to each eye once a day (at bedtime) (10 Sylvester 2023 21:10)  nortriptyline 10 mg oral capsule: 2 cap(s) orally once a day (at bedtime) (10 Sylvester 2023 19:58)  omeprazole 40 mg oral delayed release capsule: 1 cap(s) orally once a day (10 Sylvester 2023 19:58)  Systane ophthalmic solution: 1 drop(s) in each eye 4 times a day as needed (10 Sylvester 2023 21:10)  zolpidem 10 mg oral tablet: 1 tab(s) orally once a day (at bedtime) as needed (10 Sylvester 2023 19:58)            MEDICATIONS  (STANDING):  aspirin enteric coated 81 milliGRAM(s) Oral daily  atorvastatin 80 milliGRAM(s) Oral at bedtime  cefTRIAXone   IVPB 1000 milliGRAM(s) IV Intermittent every 24 hours  dextrose 5%. 1000 milliLiter(s) (50 mL/Hr) IV Continuous <Continuous>  dextrose 5%. 1000 milliLiter(s) (100 mL/Hr) IV Continuous <Continuous>  dextrose 50% Injectable 25 Gram(s) IV Push once  dextrose 50% Injectable 12.5 Gram(s) IV Push once  dextrose 50% Injectable 25 Gram(s) IV Push once  DULoxetine 90 milliGRAM(s) Oral daily  gabapentin 800 milliGRAM(s) Oral two times a day  glucagon  Injectable 1 milliGRAM(s) IntraMuscular once  insulin glargine Injectable (LANTUS) 12 Unit(s) SubCutaneous at bedtime  insulin lispro (ADMELOG) corrective regimen sliding scale   SubCutaneous three times a day before meals  insulin lispro Injectable (ADMELOG) 5 Unit(s) SubCutaneous three times a day before meals  levothyroxine 112 MICROGram(s) Oral daily  lisinopril 5 milliGRAM(s) Oral daily  nortriptyline 10 milliGRAM(s) Oral at bedtime  pantoprazole    Tablet 40 milliGRAM(s) Oral before breakfast  rivaroxaban 10 milliGRAM(s) Oral daily    MEDICATIONS  (PRN):  dextrose Oral Gel 15 Gram(s) Oral once PRN Blood Glucose LESS THAN 70 milliGRAM(s)/deciliter  zolpidem 5 milliGRAM(s) Oral at bedtime PRN Insomnia      Allergies    Remicade (Anaphylaxis)    Intolerances        Review of Systems:  Neuro: No HA, no dizziness  Cardiovascular: No chest pain, no palpitations  Respiratory: no SOB, no cough  GI: No nausea, vomiting, abdominal pain  MSK: Denies joint/muscle pain      ALL OTHER SYSTEMS REVIEWED AND NEGATIVE      PHYSICAL EXAM:  VITALS: T(C): 36.6 (06-11-23 @ 12:22)  T(F): 97.8 (06-11-23 @ 12:22), Max: 98.3 (06-10-23 @ 23:58)  HR: 80 (06-11-23 @ 14:39) (67 - 80)  BP: 152/79 (06-11-23 @ 14:39) (152/79 - 190/84)  RR:  (18 - 20)  SpO2:  (95% - 97%)  Wt(kg): --  GENERAL: NAD, well-groomed, well-developed  NEURO:  alert and oriented  RESPIRATORY: Clear to auscultation bilaterally; No rales, rhonchi, wheezing  CARDIOVASCULAR: Si S2  GI: Soft, non distended, normal bowel sounds  MUSCULOSKELETAL: Moves all extremities equally       POCT Blood Glucose.: 262 mg/dL (06-11-23 @ 12:09)  POCT Blood Glucose.: 275 mg/dL (06-11-23 @ 08:13)  POCT Blood Glucose.: 304 mg/dL (06-10-23 @ 16:08)  POCT Blood Glucose.: 525 mg/dL (06-10-23 @ 12:42)  POCT Blood Glucose.: 518 mg/dL (06-10-23 @ 12:41)                            11.9   10.25 )-----------( 277      ( 10 Sylvester 2023 13:03 )             37.9       06-10    131<L>  |  98  |  32<H>  ----------------------------<  574<HH>  4.5   |  22  |  0.93    eGFR: 62    Ca    9.1      06-10  Mg     1.8     06-10  Phos  3.3     06-10    TPro  6.2  /  Alb  3.6  /  TBili  0.3  /  DBili  x   /  AST  23  /  ALT  30  /  AlkPhos  119  06-10    Thyroid Function Tests:    Diet, DASH/TLC:   Sodium & Cholesterol Restricted  Consistent Carbohydrate Evening Snack (CSTCHOSN) (06-10-23 @ 22:33) [Active]        A1C with Estimated Average Glucose Result: 11.5 % (06-11-23 @ 07:40)                     HPI:   83-year-old female with a past medical history of hypertension, diabetes on metformin who presents emergency department complaining of dizziness   arriving via EMS.  Patient states that she was golfing with her friends today at 9 AM when she suddenly felt dizzy and had double vision.  Patient states that she felt like the room was spinning and she had blurry vision.  Patient states that the symptoms have since subsided.  Patient also believes that she has a UTI, has not seen a doctor for this. Patient arrived with lethargy and finger stick of 500. (10 Sylvester 2023 22:21)      Patient has history of diabetes, A1C 11.5 %, on home oral DM meds at home.       PAST MEDICAL & SURGICAL HISTORY:  Sjoegren syndrome      Rheumatoid arthritis      Osteopenia      Biliary cirrhosis      Nephrolithiasis      Type 2 diabetes mellitus      Hypothyroid      Status post laminectomy      Cataract  left eye cataract surgery 3/3/2014          FAMILY HISTORY:  No pertinent family history in first degree relatives        Social History:            HOME MEDICATIONS:  Home Medications:  calcipotriene 0.005% topical cream: Apply topically to affected area once a day as needed (10 Sylvester 2023 19:58)  celecoxib 200 mg oral capsule: 1 cap(s) orally once a day as needed (10 Sylvester 2023 19:58)  DULoxetine 30 mg oral delayed release capsule: 1 cap(s) orally once a day - total daily dose 90mg (10 Sylvester 2023 19:58)  DULoxetine 60 mg oral delayed release capsule: 1 cap(s) orally once a day - total daily dose 90mg (10 Sylvester 2023 19:58)  gabapentin 800 mg oral tablet: 1 tab(s) orally 2 times a day (10 Sylvester 2023 19:58)  levothyroxine 112 mcg (0.112 mg) oral tablet: 1 tab(s) orally once a day (10 Sylvester 2023 19:58)  losartan 25 mg oral tablet: 1 tab(s) orally once a day (10 Sylvester 2023 19:58)  metFORMIN 500 mg oral tablet, extended release: 2 tab(s) orally once a day (in the evening) (10 Sylvester 2023 19:58)  Kyaw 128 ophthalmic ointment: Apply to each eye once a day (at bedtime) (10 Sylvester 2023 21:10)  nortriptyline 10 mg oral capsule: 2 cap(s) orally once a day (at bedtime) (10 Sylvester 2023 19:58)  omeprazole 40 mg oral delayed release capsule: 1 cap(s) orally once a day (10 Sylvester 2023 19:58)  Systane ophthalmic solution: 1 drop(s) in each eye 4 times a day as needed (10 Sylvester 2023 21:10)  zolpidem 10 mg oral tablet: 1 tab(s) orally once a day (at bedtime) as needed (10 Sylvester 2023 19:58)            MEDICATIONS  (STANDING):  aspirin enteric coated 81 milliGRAM(s) Oral daily  atorvastatin 80 milliGRAM(s) Oral at bedtime  cefTRIAXone   IVPB 1000 milliGRAM(s) IV Intermittent every 24 hours  dextrose 5%. 1000 milliLiter(s) (50 mL/Hr) IV Continuous <Continuous>  dextrose 5%. 1000 milliLiter(s) (100 mL/Hr) IV Continuous <Continuous>  dextrose 50% Injectable 25 Gram(s) IV Push once  dextrose 50% Injectable 12.5 Gram(s) IV Push once  dextrose 50% Injectable 25 Gram(s) IV Push once  DULoxetine 90 milliGRAM(s) Oral daily  gabapentin 800 milliGRAM(s) Oral two times a day  glucagon  Injectable 1 milliGRAM(s) IntraMuscular once  insulin glargine Injectable (LANTUS) 12 Unit(s) SubCutaneous at bedtime  insulin lispro (ADMELOG) corrective regimen sliding scale   SubCutaneous three times a day before meals  insulin lispro Injectable (ADMELOG) 5 Unit(s) SubCutaneous three times a day before meals  levothyroxine 112 MICROGram(s) Oral daily  lisinopril 5 milliGRAM(s) Oral daily  nortriptyline 10 milliGRAM(s) Oral at bedtime  pantoprazole    Tablet 40 milliGRAM(s) Oral before breakfast  rivaroxaban 10 milliGRAM(s) Oral daily    MEDICATIONS  (PRN):  dextrose Oral Gel 15 Gram(s) Oral once PRN Blood Glucose LESS THAN 70 milliGRAM(s)/deciliter  zolpidem 5 milliGRAM(s) Oral at bedtime PRN Insomnia      Allergies    Remicade (Anaphylaxis)    Intolerances        Review of Systems:  Neuro: No HA, no dizziness  Cardiovascular: No chest pain, no palpitations  Respiratory: no SOB, no cough  GI: No nausea, vomiting, abdominal pain  MSK: Denies joint/muscle pain      ALL OTHER SYSTEMS REVIEWED AND NEGATIVE      PHYSICAL EXAM:  VITALS: T(C): 36.6 (06-11-23 @ 12:22)  T(F): 97.8 (06-11-23 @ 12:22), Max: 98.3 (06-10-23 @ 23:58)  HR: 80 (06-11-23 @ 14:39) (67 - 80)  BP: 152/79 (06-11-23 @ 14:39) (152/79 - 190/84)  RR:  (18 - 20)  SpO2:  (95% - 97%)  Wt(kg): --  GENERAL: NAD, well-groomed, well-developed  NEURO:  alert and oriented  RESPIRATORY: Clear to auscultation bilaterally; No rales, rhonchi, wheezing  CARDIOVASCULAR: Si S2  GI: Soft, non distended, normal bowel sounds  MUSCULOSKELETAL: Moves all extremities equally       POCT Blood Glucose.: 262 mg/dL (06-11-23 @ 12:09)  POCT Blood Glucose.: 275 mg/dL (06-11-23 @ 08:13)  POCT Blood Glucose.: 304 mg/dL (06-10-23 @ 16:08)  POCT Blood Glucose.: 525 mg/dL (06-10-23 @ 12:42)  POCT Blood Glucose.: 518 mg/dL (06-10-23 @ 12:41)                            11.9   10.25 )-----------( 277      ( 10 Sylvester 2023 13:03 )             37.9       06-10    131<L>  |  98  |  32<H>  ----------------------------<  574<HH>  4.5   |  22  |  0.93    eGFR: 62    Ca    9.1      06-10  Mg     1.8     06-10  Phos  3.3     06-10    TPro  6.2  /  Alb  3.6  /  TBili  0.3  /  DBili  x   /  AST  23  /  ALT  30  /  AlkPhos  119  06-10    Thyroid Function Tests:    Diet, DASH/TLC:   Sodium & Cholesterol Restricted  Consistent Carbohydrate Evening Snack (CSTCHOSN) (06-10-23 @ 22:33) [Active]        A1C with Estimated Average Glucose Result: 11.5 % (06-11-23 @ 07:40)

## 2023-06-11 NOTE — PHYSICAL THERAPY INITIAL EVALUATION ADULT - ADDITIONAL COMMENTS
Pt states she [place] alone [place] [address] with elevator access, denies having used assistive device, was fully independent prior functionally and with ADL's and +drives.

## 2023-06-11 NOTE — CONSULT NOTE ADULT - SUBJECTIVE AND OBJECTIVE BOX
CARDIOLOGY CONSULT - Dr. Jenkins  Date of Service: 6/11/2023      HPI:   83-year-old female with a past medical history of hypertension, diabetes on metformin who presents emergency department complaining of dizziness  arriving via EMS.  Patient states that she was golfing with her friends today at 9 AM when she suddenly felt dizzy and had double vision.  Patient states that she felt like the room was spinning and she had blurry vision.  Patient states that the symptoms have since subsided.  Patient also believes that she has a UTI, has not seen a doctor for this. Patient arrived with lethargy and finger stick of 500. (10 Sylvester 2023 22:21)      PAST MEDICAL & SURGICAL HISTORY:  Sjoegren syndrome      Rheumatoid arthritis      Osteopenia      Biliary cirrhosis      Nephrolithiasis      Type 2 diabetes mellitus      Hypothyroid      Status post laminectomy      Cataract  left eye cataract surgery 3/3/2014              PREVIOUS DIAGNOSTIC TESTING:    [ ] Echocardiogram:  [ ]  Catheterization:  [ ] Stress Test:  	    MEDICATIONS:  MEDICATIONS  (STANDING):  aspirin enteric coated 81 milliGRAM(s) Oral daily  atorvastatin 80 milliGRAM(s) Oral at bedtime  cefTRIAXone   IVPB 1000 milliGRAM(s) IV Intermittent every 24 hours  dextrose 5%. 1000 milliLiter(s) (50 mL/Hr) IV Continuous <Continuous>  dextrose 5%. 1000 milliLiter(s) (100 mL/Hr) IV Continuous <Continuous>  dextrose 50% Injectable 25 Gram(s) IV Push once  dextrose 50% Injectable 12.5 Gram(s) IV Push once  dextrose 50% Injectable 25 Gram(s) IV Push once  DULoxetine 90 milliGRAM(s) Oral daily  gabapentin 800 milliGRAM(s) Oral two times a day  glucagon  Injectable 1 milliGRAM(s) IntraMuscular once  insulin glargine Injectable (LANTUS) 20 Unit(s) SubCutaneous at bedtime  insulin lispro (ADMELOG) corrective regimen sliding scale   SubCutaneous three times a day before meals  insulin lispro Injectable (ADMELOG) 2 Unit(s) SubCutaneous three times a day before meals  levothyroxine 112 MICROGram(s) Oral daily  nortriptyline 10 milliGRAM(s) Oral at bedtime  pantoprazole    Tablet 40 milliGRAM(s) Oral before breakfast  rivaroxaban 10 milliGRAM(s) Oral daily      FAMILY HISTORY:  No pertinent family history in first degree relatives        SOCIAL HISTORY:    [ ] Non-smoker  [ ] Smoker  [ ] Alcohol    Allergies    Remicade (Anaphylaxis)    Intolerances    	    REVIEW OF SYSTEMS:  CONSTITUTIONAL: No fever, weight loss, or fatigue  EYES: No eye pain, visual disturbances, or discharge  ENMT:  No difficulty hearing, tinnitus, vertigo; No sinus or throat pain  NECK: No pain or stiffness  RESPIRATORY: No cough, wheezing, chills or hemoptysis; No Shortness of Breath  CARDIOVASCULAR: No chest pain, palpitations, passing out, dizziness, or leg swelling  GASTROINTESTINAL: No abdominal or epigastric pain. No nausea, vomiting, or hematemesis; No diarrhea or constipation. No melena or hematochezia.  GENITOURINARY: No dysuria, frequency, hematuria, or incontinence  NEUROLOGICAL: No headaches, memory loss, loss of strength, numbness, or tremors  SKIN: No itching, burning, rashes, or lesions   	    [ ] All others negative	  [ ] Unable to obtain    PHYSICAL EXAM:  T(C): 36.8 (06-11-23 @ 05:15), Max: 37 (06-10-23 @ 12:28)  HR: 69 (06-11-23 @ 05:15) (69 - 80)  BP: 166/80 (06-11-23 @ 05:15) (146/84 - 190/87)  RR: 18 (06-11-23 @ 05:15) (15 - 20)  SpO2: 96% (06-11-23 @ 05:15) (94% - 100%)  Wt(kg): --  I&O's Summary      Appearance: Normal	  Psychiatry: A & O x 3, Mood & affect appropriate  HEENT:   Normal oral mucosa, PERRL, EOMI	  Lymphatic: No lymphadenopathy  Cardiovascular: Normal S1 S2,RRR, No JVD, No murmurs  Respiratory: Lungs clear to auscultation	  Gastrointestinal:  Soft, Non-tender, + BS	  Skin: No rashes, No ecchymoses, No cyanosis	  Neurologic: Non-focal  Extremities: Normal range of motion, No clubbing, cyanosis or edema  Vascular: Peripheral pulses palpable 2+ bilaterally    TELEMETRY: 	    ECG:  	  RADIOLOGY:  OTHER: 	  	  LABS:	 	    CARDIAC MARKERS:                                  11.9   10.25 )-----------( 277      ( 10 Sylvester 2023 13:03 )             37.9     06-10    131<L>  |  98  |  32<H>  ----------------------------<  574<HH>  4.5   |  22  |  0.93    Ca    9.1      10 Sylvester 2023 13:03  Phos  3.3     06-10  Mg     1.8     06-10    TPro  6.2  /  Alb  3.6  /  TBili  0.3  /  DBili  x   /  AST  23  /  ALT  30  /  AlkPhos  119  06-10    PT/INR - ( 10 Sylvester 2023 13:46 )   PT: 12.5 sec;   INR: 1.09 ratio         PTT - ( 10 Sylvester 2023 13:46 )  PTT:27.1 sec  proBNP:   Lipid Profile:   HgA1c:   TSH:     ASSESSMENT/PLAN: 	              70 minutes spent on total encounter; more than 50% of the visit was spent counseling and/or coordinating care by the attending physician.

## 2023-06-11 NOTE — CONSULT NOTE ADULT - PROBLEM SELECTOR RECOMMENDATION 3
Will get full thyroid panel, Will continue current Synthroid dose, will FU.  Suggest to repeat thyroid function test in 4-6 weeks. Outpatient follow up.

## 2023-06-11 NOTE — PROGRESS NOTE ADULT - SUBJECTIVE AND OBJECTIVE BOX
Patient is a 83y old  Female who presents with a chief complaint of cva, UTI (2023 07:14)      SUBJECTIVE / OVERNIGHT EVENTS: ptn feels better, vertigo has resolved, dysuria has improved    MEDICATIONS  (STANDING):  aspirin enteric coated 81 milliGRAM(s) Oral daily  atorvastatin 80 milliGRAM(s) Oral at bedtime  cefTRIAXone   IVPB 1000 milliGRAM(s) IV Intermittent every 24 hours  dextrose 5%. 1000 milliLiter(s) (50 mL/Hr) IV Continuous <Continuous>  dextrose 5%. 1000 milliLiter(s) (100 mL/Hr) IV Continuous <Continuous>  dextrose 50% Injectable 25 Gram(s) IV Push once  dextrose 50% Injectable 12.5 Gram(s) IV Push once  dextrose 50% Injectable 25 Gram(s) IV Push once  DULoxetine 90 milliGRAM(s) Oral daily  gabapentin 800 milliGRAM(s) Oral two times a day  glucagon  Injectable 1 milliGRAM(s) IntraMuscular once  insulin glargine Injectable (LANTUS) 20 Unit(s) SubCutaneous at bedtime  insulin lispro (ADMELOG) corrective regimen sliding scale   SubCutaneous three times a day before meals  insulin lispro Injectable (ADMELOG) 2 Unit(s) SubCutaneous three times a day before meals  levothyroxine 112 MICROGram(s) Oral daily  lisinopril 5 milliGRAM(s) Oral daily  nortriptyline 10 milliGRAM(s) Oral at bedtime  pantoprazole    Tablet 40 milliGRAM(s) Oral before breakfast  rivaroxaban 10 milliGRAM(s) Oral daily    MEDICATIONS  (PRN):  dextrose Oral Gel 15 Gram(s) Oral once PRN Blood Glucose LESS THAN 70 milliGRAM(s)/deciliter  zolpidem 5 milliGRAM(s) Oral at bedtime PRN Insomnia      Vital Signs Last 24 Hrs  T(F): 97.8 (23 @ 12:22), Max: 98.4 (06-10-23 @ 15:10)  HR: 67 (23 @ 12:22) (67 - 80)  BP: 159/73 (23 @ 12:22) (159/73 - 190/87)  RR: 18 (23 @ 12:22) (15 - 20)  SpO2: 97% (23 @ 12:22) (94% - 98%)  Telemetry:   CAPILLARY BLOOD GLUCOSE  525 (10 Sylvester 2023 13:20)      POCT Blood Glucose.: 262 mg/dL (2023 12:09)  POCT Blood Glucose.: 275 mg/dL (2023 08:13)  POCT Blood Glucose.: 304 mg/dL (10 Sylvester 2023 16:08)    I&O's Summary    2023 07:01  -  2023 13:10  --------------------------------------------------------  IN: 440 mL / OUT: 0 mL / NET: 440 mL        PHYSICAL EXAM:  GENERAL: NAD, well-developed  HEAD:  Atraumatic, Normocephalic  EYES: EOMI, PERRLA, conjunctiva and sclera clear  NECK: Supple, No JVD  CHEST/LUNG: Clear to auscultation bilaterally; No wheeze  HEART: Regular rate and rhythm; No murmurs, rubs, or gallops  ABDOMEN: Soft, Nontender, Nondistended; Bowel sounds present  EXTREMITIES:  2+ Peripheral Pulses, No clubbing, cyanosis, or edema  PSYCH: AAOx3  NEUROLOGY: non-focal  SKIN: No rashes or lesions    LABS:                        11.9   10.25 )-----------( 277      ( 10 Sylvester 2023 13:03 )             37.9     06-10    131<L>  |  98  |  32<H>  ----------------------------<  574<HH>  4.5   |  22  |  0.93    Ca    9.1      10 Sylvester 2023 13:03  Phos  3.3     06-10  Mg     1.8     06-10    TPro  6.2  /  Alb  3.6  /  TBili  0.3  /  DBili  x   /  AST  23  /  ALT  30  /  AlkPhos  119  06-10    PT/INR - ( 10 Sylvester 2023 13:46 )   PT: 12.5 sec;   INR: 1.09 ratio         PTT - ( 10 Sylvester 2023 13:46 )  PTT:27.1 sec      Urinalysis Basic - ( 10 Sylvester 2023 13:54 )    Color: Light Yellow / Appearance: Clear / S.031 / pH: x  Gluc: x / Ketone: Negative  / Bili: Negative / Urobili: Negative   Blood: x / Protein: Trace / Nitrite: Negative   Leuk Esterase: Large / RBC: 2 /hpf / WBC 37 /HPF   Sq Epi: x / Non Sq Epi: x / Bacteria: Negative        RADIOLOGY & ADDITIONAL TESTS:    Imaging Personally Reviewed:    Consultant(s) Notes Reviewed:      Care Discussed with Consultants/Other Providers:

## 2023-06-11 NOTE — CONSULT NOTE ADULT - PROBLEM SELECTOR RECOMMENDATION 9
Will increase Lantus to 12 units at bed time.  Will increase Admelog to 5 units before each meal in addition to Admelog correction scale coverage.  Will continue monitoring FS, log, and glucose trends, will Follow up.  Patient counseled for compliance with consistent low carb diet and exercise as tolerated outpatient.

## 2023-06-11 NOTE — CONSULT NOTE ADULT - ASSESSMENT
83-year-old female with a past medical history of hypertension, diabetes on metformin who presents emergency department complaining of dizziness     #Dizziness-Near Syncope  -etiology unclear  -pt with UTI and significant hyperglycemia  -dehydration?  -IVF  -orthostatic hypotension?  -please check orthostatics  -TTE  -pt reports negative neuro workup as an outpt    #Diabetes  -uncontrolled  -hgb a1c  -mgmt per med    #HTN  -bp elevated  -start acei/arb    #UTI  -iv abx per med    70 minutes spent on total encounter; more than 50% of the visit was spent counseling and/or coordinating care by the attending physician.

## 2023-06-11 NOTE — PHYSICAL THERAPY INITIAL EVALUATION ADULT - PERTINENT HX OF CURRENT PROBLEM, REHAB EVAL
Pt is 83-year-old F PMH of hypertension, diabetes on metformin who presents emergency department complaining of dizziness  arriving via EMS.  Patient states that she was golfing with her friends today at 9 AM when she suddenly felt dizzy and had double vision.  Patient states that she felt like the room was spinning and she had blurry vision.  Patient states that the symptoms have since subsided.  Patient also believes that she has a UTI, has not seen a doctor for this. Patient arrived with lethargy and finger stick of 500. Pt admitted with UTI and significant hyperglycemia.   6/10 CT Brain stroke - No acute intracranial hemorrhage, mass effect, or midline shift. Age indeterminate right anterior lacunar pontine infarct, may be chronic.  6/10 CT angio neck - No large vessel occlusion or significant stenosis.

## 2023-06-12 ENCOUNTER — APPOINTMENT (OUTPATIENT)
Dept: RHEUMATOLOGY | Facility: CLINIC | Age: 83
End: 2023-06-12

## 2023-06-12 LAB
ANION GAP SERPL CALC-SCNC: 15 MMOL/L — SIGNIFICANT CHANGE UP (ref 5–17)
BASE EXCESS BLDV CALC-SCNC: -2.3 MMOL/L — LOW (ref -2–3)
BUN SERPL-MCNC: 26 MG/DL — HIGH (ref 7–23)
CA-I SERPL-SCNC: 1.23 MMOL/L — SIGNIFICANT CHANGE UP (ref 1.15–1.33)
CALCIUM SERPL-MCNC: 8.9 MG/DL — SIGNIFICANT CHANGE UP (ref 8.4–10.5)
CHLORIDE BLDV-SCNC: 104 MMOL/L — SIGNIFICANT CHANGE UP (ref 96–108)
CHLORIDE SERPL-SCNC: 105 MMOL/L — SIGNIFICANT CHANGE UP (ref 96–108)
CO2 BLDV-SCNC: 22 MMOL/L — SIGNIFICANT CHANGE UP (ref 22–26)
CO2 SERPL-SCNC: 19 MMOL/L — LOW (ref 22–31)
CREAT SERPL-MCNC: 0.69 MG/DL — SIGNIFICANT CHANGE UP (ref 0.5–1.3)
EGFR: 87 ML/MIN/1.73M2 — SIGNIFICANT CHANGE UP
GAS PNL BLDV: 133 MMOL/L — LOW (ref 136–145)
GAS PNL BLDV: SIGNIFICANT CHANGE UP
GLUCOSE BLDC GLUCOMTR-MCNC: 143 MG/DL — HIGH (ref 70–99)
GLUCOSE BLDC GLUCOMTR-MCNC: 201 MG/DL — HIGH (ref 70–99)
GLUCOSE BLDC GLUCOMTR-MCNC: 210 MG/DL — HIGH (ref 70–99)
GLUCOSE BLDC GLUCOMTR-MCNC: 265 MG/DL — HIGH (ref 70–99)
GLUCOSE BLDV-MCNC: 245 MG/DL — HIGH (ref 70–99)
GLUCOSE SERPL-MCNC: 234 MG/DL — HIGH (ref 70–99)
HCO3 BLDV-SCNC: 21 MMOL/L — LOW (ref 22–29)
HCT VFR BLD CALC: 38.6 % — SIGNIFICANT CHANGE UP (ref 34.5–45)
HCT VFR BLDA CALC: 39 % — SIGNIFICANT CHANGE UP (ref 34.5–46.5)
HGB BLD CALC-MCNC: 12.9 G/DL — SIGNIFICANT CHANGE UP (ref 11.7–16.1)
HGB BLD-MCNC: 12.5 G/DL — SIGNIFICANT CHANGE UP (ref 11.5–15.5)
LACTATE BLDV-MCNC: 0.9 MMOL/L — SIGNIFICANT CHANGE UP (ref 0.5–2)
LACTATE BLDV-MCNC: 1 MMOL/L — SIGNIFICANT CHANGE UP (ref 0.5–2)
MAGNESIUM SERPL-MCNC: 2 MG/DL — SIGNIFICANT CHANGE UP (ref 1.6–2.6)
MCHC RBC-ENTMCNC: 29.1 PG — SIGNIFICANT CHANGE UP (ref 27–34)
MCHC RBC-ENTMCNC: 32.4 GM/DL — SIGNIFICANT CHANGE UP (ref 32–36)
MCV RBC AUTO: 90 FL — SIGNIFICANT CHANGE UP (ref 80–100)
NRBC # BLD: 0 /100 WBCS — SIGNIFICANT CHANGE UP (ref 0–0)
PCO2 BLDV: 32 MMHG — LOW (ref 39–42)
PH BLDV: 7.43 — SIGNIFICANT CHANGE UP (ref 7.32–7.43)
PHOSPHATE SERPL-MCNC: 2.1 MG/DL — LOW (ref 2.5–4.5)
PLATELET # BLD AUTO: 312 K/UL — SIGNIFICANT CHANGE UP (ref 150–400)
PO2 BLDV: 98 MMHG — HIGH (ref 25–45)
POTASSIUM BLDV-SCNC: 4 MMOL/L — SIGNIFICANT CHANGE UP (ref 3.5–5.1)
POTASSIUM SERPL-MCNC: 4 MMOL/L — SIGNIFICANT CHANGE UP (ref 3.5–5.3)
POTASSIUM SERPL-SCNC: 4 MMOL/L — SIGNIFICANT CHANGE UP (ref 3.5–5.3)
RBC # BLD: 4.29 M/UL — SIGNIFICANT CHANGE UP (ref 3.8–5.2)
RBC # FLD: 14.8 % — HIGH (ref 10.3–14.5)
SAO2 % BLDV: 97 % — HIGH (ref 67–88)
SODIUM SERPL-SCNC: 139 MMOL/L — SIGNIFICANT CHANGE UP (ref 135–145)
T4 FREE SERPL-MCNC: 1.2 NG/DL — SIGNIFICANT CHANGE UP (ref 0.9–1.8)
TSH SERPL-MCNC: 3.05 UIU/ML — SIGNIFICANT CHANGE UP (ref 0.27–4.2)
WBC # BLD: 11.56 K/UL — HIGH (ref 3.8–10.5)
WBC # FLD AUTO: 11.56 K/UL — HIGH (ref 3.8–10.5)

## 2023-06-12 PROCEDURE — 93306 TTE W/DOPPLER COMPLETE: CPT | Mod: 26

## 2023-06-12 RX ORDER — INSULIN GLARGINE 100 [IU]/ML
15 INJECTION, SOLUTION SUBCUTANEOUS AT BEDTIME
Refills: 0 | Status: DISCONTINUED | OUTPATIENT
Start: 2023-06-12 | End: 2023-06-13

## 2023-06-12 RX ORDER — HYDRALAZINE HCL 50 MG
5 TABLET ORAL ONCE
Refills: 0 | Status: COMPLETED | OUTPATIENT
Start: 2023-06-12 | End: 2023-06-12

## 2023-06-12 RX ORDER — ACETAMINOPHEN 500 MG
650 TABLET ORAL ONCE
Refills: 0 | Status: COMPLETED | OUTPATIENT
Start: 2023-06-12 | End: 2023-06-12

## 2023-06-12 RX ORDER — INSULIN LISPRO 100/ML
7 VIAL (ML) SUBCUTANEOUS
Refills: 0 | Status: DISCONTINUED | OUTPATIENT
Start: 2023-06-12 | End: 2023-06-13

## 2023-06-12 RX ADMIN — DULOXETINE HYDROCHLORIDE 90 MILLIGRAM(S): 30 CAPSULE, DELAYED RELEASE ORAL at 11:51

## 2023-06-12 RX ADMIN — NORTRIPTYLINE HYDROCHLORIDE 10 MILLIGRAM(S): 10 CAPSULE ORAL at 21:34

## 2023-06-12 RX ADMIN — RIVAROXABAN 10 MILLIGRAM(S): KIT at 11:51

## 2023-06-12 RX ADMIN — Medication 2: at 09:36

## 2023-06-12 RX ADMIN — GABAPENTIN 800 MILLIGRAM(S): 400 CAPSULE ORAL at 05:25

## 2023-06-12 RX ADMIN — Medication 112 MICROGRAM(S): at 05:25

## 2023-06-12 RX ADMIN — INSULIN GLARGINE 15 UNIT(S): 100 INJECTION, SOLUTION SUBCUTANEOUS at 22:14

## 2023-06-12 RX ADMIN — Medication 5 MILLIGRAM(S): at 02:12

## 2023-06-12 RX ADMIN — Medication 7 UNIT(S): at 12:56

## 2023-06-12 RX ADMIN — Medication 81 MILLIGRAM(S): at 11:51

## 2023-06-12 RX ADMIN — GABAPENTIN 800 MILLIGRAM(S): 400 CAPSULE ORAL at 17:34

## 2023-06-12 RX ADMIN — LISINOPRIL 5 MILLIGRAM(S): 2.5 TABLET ORAL at 05:25

## 2023-06-12 RX ADMIN — Medication 7 UNIT(S): at 17:35

## 2023-06-12 RX ADMIN — Medication 650 MILLIGRAM(S): at 04:20

## 2023-06-12 RX ADMIN — Medication 5 UNIT(S): at 09:37

## 2023-06-12 RX ADMIN — Medication 650 MILLIGRAM(S): at 03:30

## 2023-06-12 RX ADMIN — PANTOPRAZOLE SODIUM 40 MILLIGRAM(S): 20 TABLET, DELAYED RELEASE ORAL at 06:03

## 2023-06-12 RX ADMIN — Medication 3: at 12:56

## 2023-06-12 RX ADMIN — CEFTRIAXONE 100 MILLIGRAM(S): 500 INJECTION, POWDER, FOR SOLUTION INTRAMUSCULAR; INTRAVENOUS at 23:42

## 2023-06-12 NOTE — PROGRESS NOTE ADULT - ASSESSMENT
83-year-old female with a past medical history of hypertension, diabetes on metformin who presents emergency department complaining of dizziness  arriving via EMS.  Patient states that she was golfing with her friends today at 9 AM when she suddenly felt dizzy and had double vision.  Patient states that she felt like the room was spinning and she had blurry vision.  Patient states that the symptoms have since subsided.  Patient also believes that she has a UTI, has not seen a doctor for this. Patient arrived with lethargy and finger stick of 500.    A/P  recurrent vertigo prob 2/2 UTI and uncontrolled DM  no new events, completing ABx for UTI today  cont  Insulin on sliding scale and Lantus 20 u at hs, endo called  HA1C : 11.5, ptn prob will need to transition to Insulin  CTH neg, awaiting MR brain  dvt ppx po xarelto

## 2023-06-12 NOTE — PROGRESS NOTE ADULT - ASSESSMENT
84 yo female w/ hx of RA, DM, HTN, HLD, p/w as a code stroke for altered mental status. Now with UTI.   Assessment  DMT2: 83y Female with DM T2 with hyperglycemia, A1C 11.5% , was on oral meds at home, now on basal bolus insulin with coverage, blood sugars within acceptable range.  UTI: on abx, stable, monitored. Urine cultures, glucosuria   Hypothyroidism: On Synthroid 112  mcg po daily, compliant with Synthroid intake, asymptomatic. Fulll TFT's pending.   HTN: on antihypertensive medications, monitored, asymptomatic.    Xavier Menendez MD  Cell: [phone]  Office: [phone]

## 2023-06-12 NOTE — PROGRESS NOTE ADULT - SUBJECTIVE AND OBJECTIVE BOX
Patient is a 83y old  Female who presents with a chief complaint of cva, UTI (2023 15:32)      SUBJECTIVE / OVERNIGHT EVENTS: no new events, completing ABx for UTI today    MEDICATIONS  (STANDING):  aspirin enteric coated 81 milliGRAM(s) Oral daily  atorvastatin 80 milliGRAM(s) Oral at bedtime  cefTRIAXone   IVPB 1000 milliGRAM(s) IV Intermittent every 24 hours  dextrose 5%. 1000 milliLiter(s) (50 mL/Hr) IV Continuous <Continuous>  dextrose 5%. 1000 milliLiter(s) (100 mL/Hr) IV Continuous <Continuous>  dextrose 50% Injectable 25 Gram(s) IV Push once  dextrose 50% Injectable 12.5 Gram(s) IV Push once  dextrose 50% Injectable 25 Gram(s) IV Push once  DULoxetine 90 milliGRAM(s) Oral daily  gabapentin 800 milliGRAM(s) Oral two times a day  glucagon  Injectable 1 milliGRAM(s) IntraMuscular once  insulin glargine Injectable (LANTUS) 12 Unit(s) SubCutaneous at bedtime  insulin lispro (ADMELOG) corrective regimen sliding scale   SubCutaneous three times a day before meals  insulin lispro Injectable (ADMELOG) 5 Unit(s) SubCutaneous three times a day before meals  levothyroxine 112 MICROGram(s) Oral daily  lisinopril 5 milliGRAM(s) Oral daily  nortriptyline 10 milliGRAM(s) Oral at bedtime  pantoprazole    Tablet 40 milliGRAM(s) Oral before breakfast  rivaroxaban 10 milliGRAM(s) Oral daily    MEDICATIONS  (PRN):  dextrose Oral Gel 15 Gram(s) Oral once PRN Blood Glucose LESS THAN 70 milliGRAM(s)/deciliter  zolpidem 5 milliGRAM(s) Oral at bedtime PRN Insomnia      Vital Signs Last 24 Hrs  T(F): 98.1 (23 @ 04:28), Max: 98.3 (23 @ 16:14)  HR: 75 (23 @ 04:28) (67 - 90)  BP: 137/70 (23 @ 04:28) (137/70 - 195/85)  RR: 18 (23 @ 04:28) (18 - 18)  SpO2: 96% (23 @ 04:28) (95% - 97%)  Telemetry:   CAPILLARY BLOOD GLUCOSE      POCT Blood Glucose.: 165 mg/dL (2023 21:19)  POCT Blood Glucose.: 222 mg/dL (2023 17:29)  POCT Blood Glucose.: 262 mg/dL (2023 12:09)    I&O's Summary    2023 07:01  -  2023 07:00  --------------------------------------------------------  IN: 1110 mL / OUT: 1800 mL / NET: -690 mL        PHYSICAL EXAM:  GENERAL: NAD, well-developed  HEAD:  Atraumatic, Normocephalic  EYES: EOMI, PERRLA, conjunctiva and sclera clear  NECK: Supple, No JVD  CHEST/LUNG: Clear to auscultation bilaterally; No wheeze  HEART: Regular rate and rhythm; No murmurs, rubs, or gallops  ABDOMEN: Soft, Nontender, Nondistended; Bowel sounds present  EXTREMITIES:  2+ Peripheral Pulses, No clubbing, cyanosis, or edema  PSYCH: AAOx3  NEUROLOGY: non-focal  SKIN: No rashes or lesions    LABS:                        12.5   11.56 )-----------( 312      ( 2023 06:22 )             38.6     06-12    139  |  105  |  26<H>  ----------------------------<  234<H>  4.0   |  19<L>  |  0.69    Ca    8.9      2023 06:22  Phos  2.1     06-12  Mg     2.0     06-12    TPro  6.2  /  Alb  3.6  /  TBili  0.3  /  DBili  x   /  AST  23  /  ALT  30  /  AlkPhos  119  06-10    PT/INR - ( 10 Sylvester 2023 13:46 )   PT: 12.5 sec;   INR: 1.09 ratio         PTT - ( 10 Sylvester 2023 13:46 )  PTT:27.1 sec      Urinalysis Basic - ( 10 Sylvester 2023 13:54 )    Color: Light Yellow / Appearance: Clear / S.031 / pH: x  Gluc: x / Ketone: Negative  / Bili: Negative / Urobili: Negative   Blood: x / Protein: Trace / Nitrite: Negative   Leuk Esterase: Large / RBC: 2 /hpf / WBC 37 /HPF   Sq Epi: x / Non Sq Epi: x / Bacteria: Negative        RADIOLOGY & ADDITIONAL TESTS:    Imaging Personally Reviewed:    Consultant(s) Notes Reviewed:      Care Discussed with Consultants/Other Providers:

## 2023-06-12 NOTE — PROGRESS NOTE ADULT - ASSESSMENT
83-year-old female with a past medical history of hypertension, diabetes on metformin who presents emergency department complaining of dizziness     #Dizziness-Near Syncope  -etiology unclear  -pt with UTI and significant hyperglycemia  -dehydration?  -IVF  -orthostatic hypotension noted, cont to trend  -TTE  -pt reports negative neuro workup as an outpt    #Diabetes  -uncontrolled  -hgb a1c  -mgmt per med    #HTN  -bp improved  -cont acei    #UTI  -iv abx per med    35 minutes spent on total encounter; more than 50% of the visit was spent counseling and/or coordinating care by the attending physician.

## 2023-06-12 NOTE — PROGRESS NOTE ADULT - SUBJECTIVE AND OBJECTIVE BOX
Chief complaint    Patient is a 83y old  Female who presents with a chief complaint of cva, UTI (12 Jun 2023 11:33)   Review of systems  Patient appears comfortable.    Labs and Fingersticks  CAPILLARY BLOOD GLUCOSE      POCT Blood Glucose.: 265 mg/dL (12 Jun 2023 12:15)  POCT Blood Glucose.: 210 mg/dL (12 Jun 2023 08:46)  POCT Blood Glucose.: 165 mg/dL (11 Jun 2023 21:19)  POCT Blood Glucose.: 222 mg/dL (11 Jun 2023 17:29)      Anion Gap, Serum: 15 (06-12 @ 06:22)      Calcium, Total Serum: 8.9 (06-12 @ 06:22)          06-12    139  |  105  |  26<H>  ----------------------------<  234<H>  4.0   |  19<L>  |  0.69    Ca    8.9      12 Jun 2023 06:22  Phos  2.1     06-12  Mg     2.0     06-12                          12.5   11.56 )-----------( 312      ( 12 Jun 2023 06:22 )             38.6     Medications  MEDICATIONS  (STANDING):  aspirin enteric coated 81 milliGRAM(s) Oral daily  atorvastatin 80 milliGRAM(s) Oral at bedtime  cefTRIAXone   IVPB 1000 milliGRAM(s) IV Intermittent every 24 hours  dextrose 5%. 1000 milliLiter(s) (50 mL/Hr) IV Continuous <Continuous>  dextrose 5%. 1000 milliLiter(s) (100 mL/Hr) IV Continuous <Continuous>  dextrose 50% Injectable 25 Gram(s) IV Push once  dextrose 50% Injectable 12.5 Gram(s) IV Push once  dextrose 50% Injectable 25 Gram(s) IV Push once  DULoxetine 90 milliGRAM(s) Oral daily  gabapentin 800 milliGRAM(s) Oral two times a day  glucagon  Injectable 1 milliGRAM(s) IntraMuscular once  insulin glargine Injectable (LANTUS) 15 Unit(s) SubCutaneous at bedtime  insulin lispro (ADMELOG) corrective regimen sliding scale   SubCutaneous three times a day before meals  insulin lispro Injectable (ADMELOG) 7 Unit(s) SubCutaneous three times a day before meals  levothyroxine 112 MICROGram(s) Oral daily  lisinopril 5 milliGRAM(s) Oral daily  nortriptyline 10 milliGRAM(s) Oral at bedtime  pantoprazole    Tablet 40 milliGRAM(s) Oral before breakfast  rivaroxaban 10 milliGRAM(s) Oral daily      Physical Exam  General: Patient appears comfortable.  Vital Signs Last 12 Hrs  T(F): 97.7 (06-12-23 @ 15:48), Max: 97.8 (06-12-23 @ 08:43)  HR: 82 (06-12-23 @ 15:48) (78 - 82)  BP: 129/73 (06-12-23 @ 15:48) (129/73 - 137/85)  BP(mean): --  RR: 18 (06-12-23 @ 15:48) (18 - 18)  SpO2: 96% (06-12-23 @ 15:48) (96% - 96%)  Neck: No palpable thyroid nodules.  CVS: S1S2, No murmurs  Respiratory: No wheezing, no crepitations  GI: Abdomen soft, non tender.    Diagnostics        Radiology:

## 2023-06-13 ENCOUNTER — TRANSCRIPTION ENCOUNTER (OUTPATIENT)
Age: 83
End: 2023-06-13

## 2023-06-13 VITALS
DIASTOLIC BLOOD PRESSURE: 73 MMHG | SYSTOLIC BLOOD PRESSURE: 118 MMHG | OXYGEN SATURATION: 97 % | HEART RATE: 75 BPM | TEMPERATURE: 97 F | RESPIRATION RATE: 18 BRPM

## 2023-06-13 LAB
ANION GAP SERPL CALC-SCNC: 9 MMOL/L — SIGNIFICANT CHANGE UP (ref 5–17)
BUN SERPL-MCNC: 30 MG/DL — HIGH (ref 7–23)
CALCIUM SERPL-MCNC: 9.6 MG/DL — SIGNIFICANT CHANGE UP (ref 8.4–10.5)
CHLORIDE SERPL-SCNC: 105 MMOL/L — SIGNIFICANT CHANGE UP (ref 96–108)
CO2 SERPL-SCNC: 24 MMOL/L — SIGNIFICANT CHANGE UP (ref 22–31)
CREAT SERPL-MCNC: 0.97 MG/DL — SIGNIFICANT CHANGE UP (ref 0.5–1.3)
EGFR: 59 ML/MIN/1.73M2 — LOW
GLUCOSE BLDC GLUCOMTR-MCNC: 182 MG/DL — HIGH (ref 70–99)
GLUCOSE BLDC GLUCOMTR-MCNC: 188 MG/DL — HIGH (ref 70–99)
GLUCOSE BLDC GLUCOMTR-MCNC: 296 MG/DL — HIGH (ref 70–99)
GLUCOSE SERPL-MCNC: 207 MG/DL — HIGH (ref 70–99)
HCT VFR BLD CALC: 39.5 % — SIGNIFICANT CHANGE UP (ref 34.5–45)
HGB BLD-MCNC: 12.4 G/DL — SIGNIFICANT CHANGE UP (ref 11.5–15.5)
MCHC RBC-ENTMCNC: 29.5 PG — SIGNIFICANT CHANGE UP (ref 27–34)
MCHC RBC-ENTMCNC: 31.4 GM/DL — LOW (ref 32–36)
MCV RBC AUTO: 94 FL — SIGNIFICANT CHANGE UP (ref 80–100)
NRBC # BLD: 0 /100 WBCS — SIGNIFICANT CHANGE UP (ref 0–0)
PLATELET # BLD AUTO: 326 K/UL — SIGNIFICANT CHANGE UP (ref 150–400)
POTASSIUM SERPL-MCNC: 4.7 MMOL/L — SIGNIFICANT CHANGE UP (ref 3.5–5.3)
POTASSIUM SERPL-SCNC: 4.7 MMOL/L — SIGNIFICANT CHANGE UP (ref 3.5–5.3)
RBC # BLD: 4.2 M/UL — SIGNIFICANT CHANGE UP (ref 3.8–5.2)
RBC # FLD: 15.1 % — HIGH (ref 10.3–14.5)
SODIUM SERPL-SCNC: 138 MMOL/L — SIGNIFICANT CHANGE UP (ref 135–145)
WBC # BLD: 11.48 K/UL — HIGH (ref 3.8–10.5)
WBC # FLD AUTO: 11.48 K/UL — HIGH (ref 3.8–10.5)

## 2023-06-13 PROCEDURE — 87086 URINE CULTURE/COLONY COUNT: CPT

## 2023-06-13 PROCEDURE — 93005 ELECTROCARDIOGRAM TRACING: CPT

## 2023-06-13 PROCEDURE — C8929: CPT

## 2023-06-13 PROCEDURE — 83735 ASSAY OF MAGNESIUM: CPT

## 2023-06-13 PROCEDURE — 84132 ASSAY OF SERUM POTASSIUM: CPT

## 2023-06-13 PROCEDURE — 70450 CT HEAD/BRAIN W/O DYE: CPT | Mod: MA

## 2023-06-13 PROCEDURE — 97162 PT EVAL MOD COMPLEX 30 MIN: CPT

## 2023-06-13 PROCEDURE — 82330 ASSAY OF CALCIUM: CPT

## 2023-06-13 PROCEDURE — 83605 ASSAY OF LACTIC ACID: CPT

## 2023-06-13 PROCEDURE — 84484 ASSAY OF TROPONIN QUANT: CPT

## 2023-06-13 PROCEDURE — 70498 CT ANGIOGRAPHY NECK: CPT | Mod: MA

## 2023-06-13 PROCEDURE — 70551 MRI BRAIN STEM W/O DYE: CPT | Mod: 26

## 2023-06-13 PROCEDURE — 85025 COMPLETE CBC W/AUTO DIFF WBC: CPT

## 2023-06-13 PROCEDURE — 0042T: CPT | Mod: MA

## 2023-06-13 PROCEDURE — 84295 ASSAY OF SERUM SODIUM: CPT

## 2023-06-13 PROCEDURE — 82010 KETONE BODYS QUAN: CPT

## 2023-06-13 PROCEDURE — 84443 ASSAY THYROID STIM HORMONE: CPT

## 2023-06-13 PROCEDURE — 36415 COLL VENOUS BLD VENIPUNCTURE: CPT

## 2023-06-13 PROCEDURE — 99291 CRITICAL CARE FIRST HOUR: CPT | Mod: 25

## 2023-06-13 PROCEDURE — 84439 ASSAY OF FREE THYROXINE: CPT

## 2023-06-13 PROCEDURE — 85610 PROTHROMBIN TIME: CPT

## 2023-06-13 PROCEDURE — 99233 SBSQ HOSP IP/OBS HIGH 50: CPT

## 2023-06-13 PROCEDURE — 85730 THROMBOPLASTIN TIME PARTIAL: CPT

## 2023-06-13 PROCEDURE — 96374 THER/PROPH/DIAG INJ IV PUSH: CPT

## 2023-06-13 PROCEDURE — 80048 BASIC METABOLIC PNL TOTAL CA: CPT

## 2023-06-13 PROCEDURE — 81001 URINALYSIS AUTO W/SCOPE: CPT

## 2023-06-13 PROCEDURE — 84100 ASSAY OF PHOSPHORUS: CPT

## 2023-06-13 PROCEDURE — 82435 ASSAY OF BLOOD CHLORIDE: CPT

## 2023-06-13 PROCEDURE — 82962 GLUCOSE BLOOD TEST: CPT

## 2023-06-13 PROCEDURE — 70496 CT ANGIOGRAPHY HEAD: CPT | Mod: MA

## 2023-06-13 PROCEDURE — 70551 MRI BRAIN STEM W/O DYE: CPT

## 2023-06-13 PROCEDURE — 82947 ASSAY GLUCOSE BLOOD QUANT: CPT

## 2023-06-13 PROCEDURE — 82803 BLOOD GASES ANY COMBINATION: CPT

## 2023-06-13 PROCEDURE — 80307 DRUG TEST PRSMV CHEM ANLYZR: CPT

## 2023-06-13 PROCEDURE — 85027 COMPLETE CBC AUTOMATED: CPT

## 2023-06-13 PROCEDURE — 83036 HEMOGLOBIN GLYCOSYLATED A1C: CPT

## 2023-06-13 PROCEDURE — 85018 HEMOGLOBIN: CPT

## 2023-06-13 PROCEDURE — 85014 HEMATOCRIT: CPT

## 2023-06-13 PROCEDURE — 80053 COMPREHEN METABOLIC PANEL: CPT

## 2023-06-13 RX ORDER — ATORVASTATIN CALCIUM 80 MG/1
1 TABLET, FILM COATED ORAL
Qty: 30 | Refills: 0
Start: 2023-06-13 | End: 2023-07-12

## 2023-06-13 RX ORDER — ZOLPIDEM TARTRATE 10 MG/1
1 TABLET ORAL
Qty: 0 | Refills: 0 | DISCHARGE
Start: 2023-06-13

## 2023-06-13 RX ORDER — LISINOPRIL 2.5 MG/1
1 TABLET ORAL
Qty: 30 | Refills: 0
Start: 2023-06-13 | End: 2023-07-12

## 2023-06-13 RX ORDER — NORTRIPTYLINE HYDROCHLORIDE 10 MG/1
1 CAPSULE ORAL
Qty: 0 | Refills: 0 | DISCHARGE
Start: 2023-06-13

## 2023-06-13 RX ORDER — ASPIRIN/CALCIUM CARB/MAGNESIUM 324 MG
1 TABLET ORAL
Qty: 30 | Refills: 0
Start: 2023-06-13 | End: 2023-07-12

## 2023-06-13 RX ORDER — LOSARTAN POTASSIUM 100 MG/1
1 TABLET, FILM COATED ORAL
Refills: 0 | DISCHARGE

## 2023-06-13 RX ORDER — CELECOXIB 200 MG/1
1 CAPSULE ORAL
Refills: 0 | DISCHARGE

## 2023-06-13 RX ORDER — CALCIPOTRIENE 50 UG/G
1 CREAM TOPICAL
Refills: 0 | DISCHARGE

## 2023-06-13 RX ORDER — ZOLPIDEM TARTRATE 10 MG/1
1 TABLET ORAL
Refills: 0 | DISCHARGE

## 2023-06-13 RX ORDER — NORTRIPTYLINE HYDROCHLORIDE 10 MG/1
2 CAPSULE ORAL
Refills: 0 | DISCHARGE

## 2023-06-13 RX ADMIN — Medication 81 MILLIGRAM(S): at 12:00

## 2023-06-13 RX ADMIN — RIVAROXABAN 10 MILLIGRAM(S): KIT at 12:00

## 2023-06-13 RX ADMIN — Medication 3: at 12:58

## 2023-06-13 RX ADMIN — DULOXETINE HYDROCHLORIDE 90 MILLIGRAM(S): 30 CAPSULE, DELAYED RELEASE ORAL at 11:59

## 2023-06-13 RX ADMIN — Medication 112 MICROGRAM(S): at 05:49

## 2023-06-13 RX ADMIN — Medication 7 UNIT(S): at 12:58

## 2023-06-13 RX ADMIN — PANTOPRAZOLE SODIUM 40 MILLIGRAM(S): 20 TABLET, DELAYED RELEASE ORAL at 05:48

## 2023-06-13 RX ADMIN — Medication 7 UNIT(S): at 09:12

## 2023-06-13 RX ADMIN — Medication 1: at 09:12

## 2023-06-13 RX ADMIN — GABAPENTIN 800 MILLIGRAM(S): 400 CAPSULE ORAL at 05:48

## 2023-06-13 RX ADMIN — LISINOPRIL 5 MILLIGRAM(S): 2.5 TABLET ORAL at 05:48

## 2023-06-13 NOTE — PROGRESS NOTE ADULT - PROVIDER SPECIALTY LIST ADULT
Endocrinology
Neurology
Cardiology
Internal Medicine
Internal Medicine
Cardiology
Endocrinology
Internal Medicine

## 2023-06-13 NOTE — DISCHARGE NOTE PROVIDER - NSDCCPCAREPLAN_GEN_ALL_CORE_FT
PRINCIPAL DISCHARGE DIAGNOSIS  Diagnosis: Dizziness  Assessment and Plan of Treatment: Imaging have been negative for stroke or other acute findings. Sympotms were likely related to UTI and hyperglycemia . You were treated with antibiotics and treated with insulin to control your sugars   Please follow up with your Neurologist and Endocrinologist.      SECONDARY DISCHARGE DIAGNOSES  Diagnosis: Acute UTI  Assessment and Plan of Treatment: HOME CARE INSTRUCTIONS  f you were prescribed antibiotics, take them exactly as your caregiver instructs you. Finish the medication even if you feel better after you have only taken some of the medication.  Drink enough water and fluids to keep your urine clear or pale yellow.  Avoid caffeine, tea, and carbonated beverages. They tend to irritate your bladder.  Empty your bladder often. Avoid holding urine for long periods of time.  Empty your bladder before and after sexual intercourse.  After a bowel movement, women should cleanse from front to back. Use each tissue only once.  SEEK MEDICAL CARE IF:  You have back pain.  You develop a fever.  Your symptoms do not begin to resolve within 3 days.  SEEK IMMEDIATE MEDICAL CARE IF:  You have severe back pain or lower abdominal pain.  You develop chills.  You have nausea or vomiting.  You have continued burning or discomfort with urination.      Diagnosis: DM2 (diabetes mellitus, type 2)  Assessment and Plan of Treatment: HgA1C this admission. (11.5)  Make sure you get your HgA1c checked every three months.  If you take oral diabetes medications, check your blood glucose two times a day.  If you take insulin, check your blood glucose before meals and at bedtime.  It's important not to skip any meals.  Keep a log of your blood glucose results and always take it with you to your doctor appointments.  Keep a list of your current medications including injectables and over the counter medications and bring this medication list with you to all your doctor appointments.  If you have not seen your ophthalmologist this year call for appointment.  Check your feet daily for redness, sores, or openings. Do not self treat. If no improvement in two days call your primary care physician for an appointment.  Low blood sugar (hypoglycemia) is a blood sugar below 70mg/dl. Check your blood sugar if you feel signs/symptoms of hypoglycemia. If your blood sugar is below 70 take 15 grams of carbohydrates (ex 4 oz of apple juice, 3-4 glucose tablets, or 4-6 oz of regular soda) wait 15 minutes and repeat blood sugar to make sure it comes up above 70.  If your blood sugar is above 70 and you are due for a meal, have a meal.  If you are not due for a meal have a snack.  This snack helps keeps your blood sugar at a safe range.      Diagnosis: Hypothyroid  Assessment and Plan of Treatment: you do not make enough thyroid hormone  signs & symptoms of low levels of thyroid hormone - tired, getting cold easily, coarse or thin hair, constipation, shortness of breath, swelling, irregular periods  your doctor will do thyroid hormone blood tests at least once a year to monitor if medication dose is adequate  take your thyroid medicine as directed by your doctor & on empty stomach      Diagnosis: HTN (hypertension)  Assessment and Plan of Treatment: Low salt diet  Activity as tolerated.  Take all medication as prescribed.  Follow up with your medical doctor for routine blood pressure monitoring at your next visit.  Notify your doctor if you have any of the following symptoms:   Dizziness, Lightheadedness, Blurry vision, Headache, Chest pain, Shortness of breath       PRINCIPAL DISCHARGE DIAGNOSIS  Diagnosis: Dizziness  Assessment and Plan of Treatment: Imaging was negative for stroke or other acute findings. Sympotms were likely related to UTI and hyperglycemia . You were treated with antibiotics and treated with insulin to control your sugars.   Please follow up with your Neurologist and Endocrinologist.      SECONDARY DISCHARGE DIAGNOSES  Diagnosis: HTN (hypertension)  Assessment and Plan of Treatment: Low salt diet  Activity as tolerated.  Take all medication as prescribed.  Follow up with your medical doctor for routine blood pressure monitoring at your next visit.  Notify your doctor if you have any of the following symptoms:   Dizziness, Lightheadedness, Blurry vision, Headache, Chest pain, Shortness of breath      Diagnosis: DM2 (diabetes mellitus, type 2)  Assessment and Plan of Treatment: HgA1C this admission. (11.5)!!!!!!!   Seen by endocrine.  Continue to take your home medications.  Follow a consistent carbodydrate diet for diabetics and follow up with endocrine within 4 weeks.  Make sure you get your HgA1c checked every three months.  If you take oral diabetes medications, check your blood glucose two times a day.  If you take insulin, check your blood glucose before meals and at bedtime.  It's important not to skip any meals.  Keep a log of your blood glucose results and always take it with you to your doctor appointments.  Keep a list of your current medications including injectables and over the counter medications and bring this medication list with you to all your doctor appointments.  If you have not seen your ophthalmologist this year call for appointment.  Check your feet daily for redness, sores, or openings. Do not self treat. If no improvement in two days call your primary care physician for an appointment.  Low blood sugar (hypoglycemia) is a blood sugar below 70mg/dl. Check your blood sugar if you feel signs/symptoms of hypoglycemia. If your blood sugar is below 70 take 15 grams of carbohydrates (ex 4 oz of apple juice, 3-4 glucose tablets, or 4-6 oz of regular soda) wait 15 minutes and repeat blood sugar to make sure it comes up above 70.  If your blood sugar is above 70 and you are due for a meal, have a meal.  If you are not due for a meal have a snack.  This snack helps keeps your blood sugar at a safe range.      Diagnosis: Hypothyroid  Assessment and Plan of Treatment: You do not make enough thyroid hormone  signs & symptoms of low levels of thyroid hormone - tired, getting cold easily, coarse or thin hair, constipation, shortness of breath, swelling, irregular periods  your doctor will do thyroid hormone blood tests at least once a year to monitor if medication dose is adequate  take your thyroid medicine as directed by your doctor & on empty stomach      Diagnosis: Acute UTI  Assessment and Plan of Treatment: HOME CARE INSTRUCTIONS  Drink enough water and fluids to keep your urine clear or pale yellow.  Avoid caffeine, tea, and carbonated beverages. They tend to irritate your bladder.  Empty your bladder often. Avoid holding urine for long periods of time.  Empty your bladder before and after sexual intercourse.  After a bowel movement, women should cleanse from front to back. Use each tissue only once.  SEEK MEDICAL CARE IF:  You have back pain.  You develop a fever.  Your symptoms do not begin to resolve within 3 days.  SEEK IMMEDIATE MEDICAL CARE IF:  You have severe back pain or lower abdominal pain.  You develop chills.  You have nausea or vomiting.  You have continued burning or discomfort with urination.

## 2023-06-13 NOTE — DISCHARGE NOTE PROVIDER - PROVIDER TOKENS
PROVIDER:[TOKEN:[[id number]],FOLLOWUP:[1 week]],PROVIDER:[TOKEN:[[id number]]],PROVIDER:[TOKEN:[[id number]]]

## 2023-06-13 NOTE — PROGRESS NOTE ADULT - SUBJECTIVE AND OBJECTIVE BOX
NEUROLOGY FOLLOW-UP CONSULT NOTE    RFC: AMS    Interval history: No acute neurologic events overnight. Patient is AOX3, reports resolved blurry and double vision. She is able to follow all commands.    Meds:  MEDICATIONS  (STANDING):  aspirin enteric coated 81 milliGRAM(s) Oral daily  atorvastatin 80 milliGRAM(s) Oral at bedtime  dextrose 5%. 1000 milliLiter(s) (50 mL/Hr) IV Continuous <Continuous>  dextrose 5%. 1000 milliLiter(s) (100 mL/Hr) IV Continuous <Continuous>  dextrose 50% Injectable 12.5 Gram(s) IV Push once  dextrose 50% Injectable 25 Gram(s) IV Push once  dextrose 50% Injectable 25 Gram(s) IV Push once  DULoxetine 90 milliGRAM(s) Oral daily  gabapentin 800 milliGRAM(s) Oral two times a day  glucagon  Injectable 1 milliGRAM(s) IntraMuscular once  insulin glargine Injectable (LANTUS) 15 Unit(s) SubCutaneous at bedtime  insulin lispro (ADMELOG) corrective regimen sliding scale   SubCutaneous three times a day before meals  insulin lispro Injectable (ADMELOG) 7 Unit(s) SubCutaneous three times a day before meals  levothyroxine 112 MICROGram(s) Oral daily  lisinopril 5 milliGRAM(s) Oral daily  nortriptyline 10 milliGRAM(s) Oral at bedtime  pantoprazole    Tablet 40 milliGRAM(s) Oral before breakfast  rivaroxaban 10 milliGRAM(s) Oral daily    MEDICATIONS  (PRN):  dextrose Oral Gel 15 Gram(s) Oral once PRN Blood Glucose LESS THAN 70 milliGRAM(s)/deciliter  zolpidem 5 milliGRAM(s) Oral at bedtime PRN Insomnia      PMHx/PSHx/FHx/SHx:  Urinary tract infection    Sjoegren syndrome    Rheumatoid arthritis    Osteopenia    Biliary cirrhosis    Nephrolithiasis    Type 2 diabetes mellitus    Hypothyroid    Acute UTI    DM2 (diabetes mellitus, type 2)    HTN (hypertension)    Hypothyroid    Status post laminectomy    Cataract    DIZZINESS          Allergies:  Remicade (Anaphylaxis)      ROS: All systems negative except as documented in Interval history    O:  T(C): 36.3 (06-13-23 @ 12:30), Max: 36.9 (06-12-23 @ 20:16)  HR: 75 (06-13-23 @ 12:30) (73 - 84)  BP: 118/73 (06-13-23 @ 12:30) (118/73 - 147/82)  RR: 18 (06-13-23 @ 12:30) (18 - 18)  SpO2: 97% (06-13-23 @ 12:30) (94% - 97%)    Focused neurologic exam:  MS - AAO x3, speech fluent, rep/naming intact, follows commands, attn/conc/recent and remote memory/fund of knowledge WNL  CN - PERRLA, EOMI, VFF, face sens/str/hearing WNL b/l, tongue/palate midline, trap 5/5 b/l  Motor - Normal bulk/tone, 5/5 all  Sens - LT/temp/vib intact all  DTR's - 2+ all and downgoing b/l plantar response  Coord - FtN intact b/l  Gait and station - Not assessed due to fall risk    Pertinent labs/studies:    LABS:  cret                        12.4   11.48 )-----------( 326      ( 13 Jun 2023 06:35 )             39.5     06-13    138  |  105  |  30<H>  ----------------------------<  207<H>  4.7   |  24  |  0.97    Ca    9.6      13 Jun 2023 06:31  Phos  2.1     06-12  Mg     2.0     06-12      Radiology:  < from: CT Brain Stroke Protocol (06.10.23 @ 14:22) >    IMPRESSION:  No acute intracranial hemorrhage, mass effect, or midline shift.  Age indeterminate right anterior lacunar pontine infarct, may be chronic.      < end of copied text >  < from: CT Angio Neck Stroke Protocol w/ IV Cont (06.10.23 @ 15:25) >  IMPRESSION:    CTA neck and brain:  No large vessel occlusion or significant stenosis.    CT Perfusion:  The study is motion degraded and nondiagnostic.    < end of copied text >  < from: MR Head No Cont (06.13.23 @ 10:22) >  FINDINGS:    BRAIN:   The brain demonstrates largely symmetric patchy and confluent   lesions within the deep cerebral hemispheric white matter that are   consistent with ischemic white matter disease. Involvement is most   extensive and confluent in the centrum semiovale and periatrial white   matter. Smaller patchy subcortical and deeper lesions are present. These   lesions are hyperintense on the long TR images, otherwise inconspicuous.   No cerebral cortical lesion is recognized. Nodiffusion restriction is   found in the brain.  No acute cerebral cortical infarct is found.   No   intracranial hemorrhage is recognized.  No mass effect is found in the   brain.    CSF SPACES:   The ventricles, sulci and basal cisterns  appear mildto   moderately dilated reflecting diffuse brain volume loss.   The internal   auditory canals appear intact, without osseous expansion or soft tissue   mass lesion.  The 7th and 8th cranial nerves appear intact, allowing for   this technique.   Inner ear structures appear normally formed.    VESSELS:   The vertebral and internal carotid arteries demonstrate   expected flow voids indicating their patency.  Please see separate CT   angiography report.    HEAD AND NECK STRUCTURES:   The orbits demonstrate lens replacements.    Paranasal sinuses are clear.  The nasal cavity appears intact.  The   central skull base appears intact.  The nasopharynx is symmetric.  The   temporal bones appear clear of disease.  The calvarium appears   unremarkable.      IMPRESSION:  Ischemic white matter disease and atrophy typical for age.   No evidence of infarction    < end of copied text >

## 2023-06-13 NOTE — DISCHARGE NOTE PROVIDER - CARE PROVIDER_API CALL
Ricci Diaz  Cardiovascular Disease  [address], [address]  [place] [address]  Phone: [phone]  Fax: [phone]  Follow Up Time: 1 week    Jarocho Malin  Neurology  [address], [address]  [place] [address]  Phone: [phone]  Fax: [phone]  Follow Up Time:     Xavier Menendez)  EndocrinologyMetabDiabetes; Internal Medicine  [address] [place] [address]  Phone: [phone]  Fax: [phone]  Follow Up Time:

## 2023-06-13 NOTE — PROGRESS NOTE ADULT - ASSESSMENT
83-year-old female with a past medical history of hypertension, diabetes on metformin who presents emergency department complaining of dizziness     #Dizziness-Near Syncope  -etiology unclear  -pt with UTI and significant hyperglycemia  -dehydration?  -IVF  -orthostatic hypotension noted, cont to trend  -TTE w hyperdynamic lvef  -pt reports negative neuro workup as an outpt    #Diabetes  -uncontrolled  -hgb a1c  -mgmt per med    #HTN  -bp improved  -cont acei    #UTI  -iv abx per med    35 minutes spent on total encounter; more than 50% of the visit was spent counseling and/or coordinating care by the attending physician.

## 2023-06-13 NOTE — DISCHARGE NOTE PROVIDER - NSDCFUSCHEDAPPT_GEN_ALL_CORE_FT
Radha Lombardi S  [facility]  RHEUM [address]  Scheduled Appointment: 08/04/2023    Denia Myers S  [facility]  OPHTHALM [address]  Scheduled Appointment: 09/06/2023

## 2023-06-13 NOTE — PROGRESS NOTE ADULT - SUBJECTIVE AND OBJECTIVE BOX
CARDIOLOGY FOLLOW UP - Dr. Jenkins  Date of Service: 6/13/23  CC: no events    Review of Systems:  Constitutional: No fever, weight loss, or fatigue  Respiratory: No cough, wheezing, or hemoptysis, no shortness of breath  Cardiovascular: No chest pain, palpitations, passing out, dizziness, or leg swelling  Gastrointestinal: No abd or epigastric pain. No nausea, vomiting, or hematemesis; no diarrhea or consiptaiton, no melena or hematochezia  Vascular: No edema     TELEMETRY:    PHYSICAL EXAM:  T(C): 36.3 (06-13-23 @ 12:30), Max: 36.9 (06-12-23 @ 20:16)  HR: 75 (06-13-23 @ 12:30) (73 - 84)  BP: 118/73 (06-13-23 @ 12:30) (118/73 - 147/82)  RR: 18 (06-13-23 @ 12:30) (18 - 18)  SpO2: 97% (06-13-23 @ 12:30) (94% - 97%)  Wt(kg): --  I&O's Summary    12 Jun 2023 07:01  -  13 Jun 2023 07:00  --------------------------------------------------------  IN: 1070 mL / OUT: 0 mL / NET: 1070 mL    13 Jun 2023 07:01  -  13 Jun 2023 14:29  --------------------------------------------------------  IN: 400 mL / OUT: 0 mL / NET: 400 mL        Appearance: Normal	  Cardiovascular: Normal S1 S2,RRR, No JVD, No murmurs  Respiratory: Lungs clear to auscultation	  Gastrointestinal:  Soft, Non-tender, + BS	  Extremities: Normal range of motion, No clubbing, cyanosis or edema  Vascular: Peripheral pulses palpable 2+ bilaterally       Home Medications:  calcipotriene 0.005% topical cream: Apply topically to affected area once a day as needed (10 Sylvester 2023 19:58)  celecoxib 200 mg oral capsule: 1 cap(s) orally once a day as needed (10 Sylvester 2023 19:58)  DULoxetine 30 mg oral delayed release capsule: 1 cap(s) orally once a day - total daily dose 90mg (10 Sylvester 2023 19:58)  DULoxetine 60 mg oral delayed release capsule: 1 cap(s) orally once a day - total daily dose 90mg (10 Sylvester 2023 19:58)  gabapentin 800 mg oral tablet: 1 tab(s) orally 2 times a day (10 Sylvester 2023 19:58)  levothyroxine 112 mcg (0.112 mg) oral tablet: 1 tab(s) orally once a day (10 Sylvester 2023 19:58)  losartan 25 mg oral tablet: 1 tab(s) orally once a day (10 Sylvester 2023 19:58)  metFORMIN 500 mg oral tablet, extended release: 2 tab(s) orally once a day (in the evening) (10 Sylvester 2023 19:58)  Kyaw 128 ophthalmic ointment: Apply to each eye once a day (at bedtime) (10 Sylvester 2023 21:10)  nortriptyline 10 mg oral capsule: 2 cap(s) orally once a day (at bedtime) (10 Sylvester 2023 19:58)  omeprazole 40 mg oral delayed release capsule: 1 cap(s) orally once a day (10 Sylvester 2023 19:58)  Systane ophthalmic solution: 1 drop(s) in each eye 4 times a day as needed (10 Sylvester 2023 21:10)  zolpidem 10 mg oral tablet: 1 tab(s) orally once a day (at bedtime) as needed (10 Sylvester 2023 19:58)        MEDICATIONS  (STANDING):  aspirin enteric coated 81 milliGRAM(s) Oral daily  atorvastatin 80 milliGRAM(s) Oral at bedtime  dextrose 5%. 1000 milliLiter(s) (50 mL/Hr) IV Continuous <Continuous>  dextrose 5%. 1000 milliLiter(s) (100 mL/Hr) IV Continuous <Continuous>  dextrose 50% Injectable 12.5 Gram(s) IV Push once  dextrose 50% Injectable 25 Gram(s) IV Push once  dextrose 50% Injectable 25 Gram(s) IV Push once  DULoxetine 90 milliGRAM(s) Oral daily  gabapentin 800 milliGRAM(s) Oral two times a day  glucagon  Injectable 1 milliGRAM(s) IntraMuscular once  insulin glargine Injectable (LANTUS) 15 Unit(s) SubCutaneous at bedtime  insulin lispro (ADMELOG) corrective regimen sliding scale   SubCutaneous three times a day before meals  insulin lispro Injectable (ADMELOG) 7 Unit(s) SubCutaneous three times a day before meals  levothyroxine 112 MICROGram(s) Oral daily  lisinopril 5 milliGRAM(s) Oral daily  nortriptyline 10 milliGRAM(s) Oral at bedtime  pantoprazole    Tablet 40 milliGRAM(s) Oral before breakfast  rivaroxaban 10 milliGRAM(s) Oral daily        EKG:  RADIOLOGY:  DIAGNOSTIC TESTING:  [ ] Echocardiogram:  [ ] Catherterization:  [ ] Stress Test:  OTHER:     LABS:	 	                          12.4   11.48 )-----------( 326      ( 13 Jun 2023 06:35 )             39.5     06-13    138  |  105  |  30<H>  ----------------------------<  207<H>  4.7   |  24  |  0.97    Ca    9.6      13 Jun 2023 06:31  Phos  2.1     06-12  Mg     2.0     06-12            CARDIAC MARKERS:

## 2023-06-13 NOTE — DISCHARGE NOTE PROVIDER - HOSPITAL COURSE
83-year-old female with a past medical history of hypertension, diabetes on metformin who presents emergency department complaining of dizziness  arriving via EMS as code stroke .  Patient states that she was golfing with her friends today at 9 AM when she suddenly felt dizzy and had double vision.  Patient states that she felt like the room was spinning and she had blurry vision.  Patient states that the symptoms have since subsided. Patient also believes that she has a UTI, has not seen a doctor for this. Patient arrived with lethargy and finger stick of 500. Recurrent vertigo likely 2/2 UTI and uncontrolled DM.  A1C 11.5% , was on oral meds at home, now on basal bolus insulin with coverage, blood sugars within acceptable range. On discharge will be sent on_______________******. Pt evaluated by Neuro, imaging CTA neck and brain were negative for any stenosis, as well as MRI brain. AMS, lethargy, urinary symptoms 2/2 UTI & hyperglycemia. Patient's mental status has improved, back to baseline. Blurry vision and double vision resolved. Pt has outpt f/u with her Neurologist, Dr. Jordan. Pt received 3 day course of ceftriaxone for suspected UTI, though urine cx was negative. Pt is currently at baseline and is medically stable for discharge.      83-year-old female with a past medical history of hypertension, diabetes on metformin who presents emergency department complaining of dizziness  arriving via EMS as code stroke .  Patient states that she was golfing with her friends today at 9 AM when she suddenly felt dizzy and had double vision.  Patient states that she felt like the room was spinning and she had blurry vision.  Patient states that the symptoms have since subsided. Patient also believes that she has a UTI, has not seen a doctor for this. Patient arrived with lethargy and finger stick of 500. Recurrent vertigo likely 2/2 UTI and uncontrolled DM.  A1C 11.5% , was on oral meds at home, now on basal bolus insulin with coverage, blood sugars within acceptable range. On discharge will resume her home regimen and follow up with endocrinology in 4 weeks per endo.  Pt evaluated by Neuro, imaging CTA neck and brain were negative for any stenosis, as well as MRI brain. AMS, lethargy, urinary symptoms 2/2 UTI & hyperglycemia. Patient's mental status has improved, back to baseline. Blurry vision and double vision resolved. Pt has outpt f/u with her Neurologist, Dr. Jordan with EEG as outpatient. Pt received 3 day course of ceftriaxone for suspected UTI, though urine cx was negative. Pt is currently at baseline and is medically stable for discharge.

## 2023-06-13 NOTE — PROGRESS NOTE ADULT - SUBJECTIVE AND OBJECTIVE BOX
Chief complaint  Patient is a 83y old  Female who presents with a chief complaint of cva, UTI (13 Jun 2023 12:54)   Review of systems  Patient in bed, looks comfortable, no hypoglycemic episodes.    Labs and Fingersticks  CAPILLARY BLOOD GLUCOSE      POCT Blood Glucose.: 296 mg/dL (13 Jun 2023 12:10)  POCT Blood Glucose.: 182 mg/dL (13 Jun 2023 09:08)  POCT Blood Glucose.: 188 mg/dL (13 Jun 2023 08:00)  POCT Blood Glucose.: 201 mg/dL (12 Jun 2023 21:59)  POCT Blood Glucose.: 143 mg/dL (12 Jun 2023 17:19)      Anion Gap, Serum: 9 (06-13 @ 06:31)  Anion Gap, Serum: 15 (06-12 @ 06:22)      Calcium, Total Serum: 9.6 (06-13 @ 06:31)  Calcium, Total Serum: 8.9 (06-12 @ 06:22)          06-13    138  |  105  |  30<H>  ----------------------------<  207<H>  4.7   |  24  |  0.97    Ca    9.6      13 Jun 2023 06:31  Phos  2.1     06-12  Mg     2.0     06-12                          12.4   11.48 )-----------( 326      ( 13 Jun 2023 06:35 )             39.5     Medications  MEDICATIONS  (STANDING):  aspirin enteric coated 81 milliGRAM(s) Oral daily  atorvastatin 80 milliGRAM(s) Oral at bedtime  dextrose 5%. 1000 milliLiter(s) (50 mL/Hr) IV Continuous <Continuous>  dextrose 5%. 1000 milliLiter(s) (100 mL/Hr) IV Continuous <Continuous>  dextrose 50% Injectable 25 Gram(s) IV Push once  dextrose 50% Injectable 12.5 Gram(s) IV Push once  dextrose 50% Injectable 25 Gram(s) IV Push once  DULoxetine 90 milliGRAM(s) Oral daily  gabapentin 800 milliGRAM(s) Oral two times a day  glucagon  Injectable 1 milliGRAM(s) IntraMuscular once  insulin glargine Injectable (LANTUS) 15 Unit(s) SubCutaneous at bedtime  insulin lispro (ADMELOG) corrective regimen sliding scale   SubCutaneous three times a day before meals  insulin lispro Injectable (ADMELOG) 7 Unit(s) SubCutaneous three times a day before meals  levothyroxine 112 MICROGram(s) Oral daily  lisinopril 5 milliGRAM(s) Oral daily  nortriptyline 10 milliGRAM(s) Oral at bedtime  pantoprazole    Tablet 40 milliGRAM(s) Oral before breakfast  rivaroxaban 10 milliGRAM(s) Oral daily      Physical Exam  General: Patient comfortable in bed  Vital Signs Last 12 Hrs  T(F): 97.4 (06-13-23 @ 12:30), Max: 98.3 (06-13-23 @ 08:01)  HR: 75 (06-13-23 @ 12:30) (73 - 75)  BP: 118/73 (06-13-23 @ 12:30) (118/73 - 147/82)  BP(mean): --  RR: 18 (06-13-23 @ 12:30) (18 - 18)  SpO2: 97% (06-13-23 @ 12:30) (95% - 97%)  Neck: No palpable thyroid nodules.  CVS: S1S2, No murmurs  Respiratory: No wheezing, no crepitations  GI: Abdomen soft, bowel sounds positive  Musculoskeletal:  edema lower extremities.   Skin: No skin rashes, no ecchymosis    Diagnostics             Chief complaint  Patient is a 83y old  Female who presents with a chief complaint of cva, UTI (13 Jun 2023 12:54)   Review of systems  Patient in bed, looks comfortable, no hypoglycemic episodes.    Labs and Fingersticks  CAPILLARY BLOOD GLUCOSE      POCT Blood Glucose.: 296 mg/dL (13 Jun 2023 12:10)  POCT Blood Glucose.: 182 mg/dL (13 Jun 2023 09:08)  POCT Blood Glucose.: 188 mg/dL (13 Jun 2023 08:00)  POCT Blood Glucose.: 201 mg/dL (12 Jun 2023 21:59)  POCT Blood Glucose.: 143 mg/dL (12 Jun 2023 17:19)      Anion Gap, Serum: 9 (06-13 @ 06:31)  Anion Gap, Serum: 15 (06-12 @ 06:22)      Calcium, Total Serum: 9.6 (06-13 @ 06:31)  Calcium, Total Serum: 8.9 (06-12 @ 06:22)          06-13    138  |  105  |  30<H>  ----------------------------<  207<H>  4.7   |  24  |  0.97    Ca    9.6      13 Jun 2023 06:31  Phos  2.1     06-12  Mg     2.0     06-12                          12.4   11.48 )-----------( 326      ( 13 Jun 2023 06:35 )             39.5     Medications  MEDICATIONS  (STANDING):  aspirin enteric coated 81 milliGRAM(s) Oral daily  atorvastatin 80 milliGRAM(s) Oral at bedtime  dextrose 5%. 1000 milliLiter(s) (50 mL/Hr) IV Continuous <Continuous>  dextrose 5%. 1000 milliLiter(s) (100 mL/Hr) IV Continuous <Continuous>  dextrose 50% Injectable 25 Gram(s) IV Push once  dextrose 50% Injectable 12.5 Gram(s) IV Push once  dextrose 50% Injectable 25 Gram(s) IV Push once  DULoxetine 90 milliGRAM(s) Oral daily  gabapentin 800 milliGRAM(s) Oral two times a day  glucagon  Injectable 1 milliGRAM(s) IntraMuscular once  insulin glargine Injectable (LANTUS) 15 Unit(s) SubCutaneous at bedtime  insulin lispro (ADMELOG) corrective regimen sliding scale   SubCutaneous three times a day before meals  insulin lispro Injectable (ADMELOG) 7 Unit(s) SubCutaneous three times a day before meals  levothyroxine 112 MICROGram(s) Oral daily  lisinopril 5 milliGRAM(s) Oral daily  nortriptyline 10 milliGRAM(s) Oral at bedtime  pantoprazole    Tablet 40 milliGRAM(s) Oral before breakfast  rivaroxaban 10 milliGRAM(s) Oral daily      Physical Exam  General: Patient comfortable in bed  Vital Signs Last 12 Hrs  T(F): 97.4 (06-13-23 @ 12:30), Max: 98.3 (06-13-23 @ 08:01)  HR: 75 (06-13-23 @ 12:30) (73 - 75)  BP: 118/73 (06-13-23 @ 12:30) (118/73 - 147/82)  BP(mean): --  RR: 18 (06-13-23 @ 12:30) (18 - 18)  SpO2: 97% (06-13-23 @ 12:30) (95% - 97%)  Neck: No palpable thyroid nodules.  CVS: S1S2, No murmurs  Respiratory: No wheezing, no crepitations  GI: Abdomen soft, bowel sounds positive  Musculoskeletal:  edema lower extremities.   Skin: No skin rashes, no ecchymosis    Diagnostics

## 2023-06-13 NOTE — PROGRESS NOTE ADULT - ASSESSMENT
84 yo female w/ hx of RA, DM, HTN, HLD, p/w as a code stroke for altered mental status. Now with UTI.   Assessment  DMT2: 83y Female with DM T2 with hyperglycemia, A1C 11.5% , was on oral meds at home, now on basal bolus insulin with coverage, blood sugars are fluctuating with intermittent elevations, no hypoglycemias, eating meals, planning DC.  UTI: on abx, stable, monitored. Urine cultures, glucosuria   Hypothyroidism: On Synthroid 112  mcg po daily, compliant with Synthroid intake, asymptomatic, euthyroid.  HTN: on antihypertensive medications, monitored, asymptomatic.    Xavier Menendez MD  Cell: [phone]  Office: [phone]             84 yo female w/ hx of RA, DM, HTN, HLD, p/w as a code stroke for altered mental status. Now with UTI.   Assessment  DMT2: 83y Female with DM T2 with hyperglycemia, A1C 11.5% , was on oral meds at home, now on basal bolus insulin with coverage, blood sugars are  fluctuating with intermittent elevations, no hypoglycemias, eating meals, planning DC.  UTI: on abx, stable, monitored. Urine cultures, glucosuria   Hypothyroidism: On Synthroid 112  mcg po daily, compliant with Synthroid intake, asymptomatic, euthyroid.  HTN: on antihypertensive medications, monitored, asymptomatic.    Xavier Menendez MD  Cell: [phone]  Office: [phone]

## 2023-06-13 NOTE — PROGRESS NOTE ADULT - PROBLEM SELECTOR PLAN 1
Will continue current insulin regimen for now. Will continue monitoring  blood sugars, will Follow up.  Patient counseled for compliance with consistent low carb diet.  Suggest DC on Janumet 50/1000 BID. FU outpatient 2 months.
Will continue current insulin regimen for now. Will continue monitoring  blood sugars, will Follow up.  Patient counseled for compliance with consistent low carb diet.  .

## 2023-06-13 NOTE — PROGRESS NOTE ADULT - SUBJECTIVE AND OBJECTIVE BOX
Patient is a 83y old  Female who presents with a chief complaint of cva, UTI (13 Jun 2023 14:12)      SUBJECTIVE / OVERNIGHT EVENTS: ptn has poor compliance w DM meds AND diet. compliance was d/w ptn. she completed ABx for UTI. SHE will need NEURO f/u w her neurologist for EEG. she had MRI done here. no acute abn seen.. there is no urgenct indication for EEG to be done on inptn basis    MEDICATIONS  (STANDING):  aspirin enteric coated 81 milliGRAM(s) Oral daily  atorvastatin 80 milliGRAM(s) Oral at bedtime  dextrose 5%. 1000 milliLiter(s) (50 mL/Hr) IV Continuous <Continuous>  dextrose 5%. 1000 milliLiter(s) (100 mL/Hr) IV Continuous <Continuous>  dextrose 50% Injectable 12.5 Gram(s) IV Push once  dextrose 50% Injectable 25 Gram(s) IV Push once  dextrose 50% Injectable 25 Gram(s) IV Push once  DULoxetine 90 milliGRAM(s) Oral daily  gabapentin 800 milliGRAM(s) Oral two times a day  glucagon  Injectable 1 milliGRAM(s) IntraMuscular once  insulin glargine Injectable (LANTUS) 15 Unit(s) SubCutaneous at bedtime  insulin lispro (ADMELOG) corrective regimen sliding scale   SubCutaneous three times a day before meals  insulin lispro Injectable (ADMELOG) 7 Unit(s) SubCutaneous three times a day before meals  levothyroxine 112 MICROGram(s) Oral daily  lisinopril 5 milliGRAM(s) Oral daily  nortriptyline 10 milliGRAM(s) Oral at bedtime  pantoprazole    Tablet 40 milliGRAM(s) Oral before breakfast  rivaroxaban 10 milliGRAM(s) Oral daily    MEDICATIONS  (PRN):  dextrose Oral Gel 15 Gram(s) Oral once PRN Blood Glucose LESS THAN 70 milliGRAM(s)/deciliter  zolpidem 5 milliGRAM(s) Oral at bedtime PRN Insomnia      Vital Signs Last 24 Hrs  T(F): 97.4 (06-13-23 @ 12:30), Max: 98.4 (06-12-23 @ 20:16)  HR: 75 (06-13-23 @ 12:30) (73 - 84)  BP: 118/73 (06-13-23 @ 12:30) (118/73 - 147/82)  RR: 18 (06-13-23 @ 12:30) (18 - 18)  SpO2: 97% (06-13-23 @ 12:30) (94% - 97%)  Telemetry:   CAPILLARY BLOOD GLUCOSE      POCT Blood Glucose.: 296 mg/dL (13 Jun 2023 12:10)  POCT Blood Glucose.: 182 mg/dL (13 Jun 2023 09:08)  POCT Blood Glucose.: 188 mg/dL (13 Jun 2023 08:00)  POCT Blood Glucose.: 201 mg/dL (12 Jun 2023 21:59)  POCT Blood Glucose.: 143 mg/dL (12 Jun 2023 17:19)    I&O's Summary    12 Jun 2023 07:01  -  13 Jun 2023 07:00  --------------------------------------------------------  IN: 1070 mL / OUT: 0 mL / NET: 1070 mL    13 Jun 2023 07:01  -  13 Jun 2023 14:39  --------------------------------------------------------  IN: 400 mL / OUT: 0 mL / NET: 400 mL        PHYSICAL EXAM:  GENERAL: NAD, well-developed  HEAD:  Atraumatic, Normocephalic  EYES: EOMI, PERRLA, conjunctiva and sclera clear  NECK: Supple, No JVD  CHEST/LUNG: Clear to auscultation bilaterally; No wheeze  HEART: Regular rate and rhythm; No murmurs, rubs, or gallops  ABDOMEN: Soft, Nontender, Nondistended; Bowel sounds present  EXTREMITIES:  2+ Peripheral Pulses, No clubbing, cyanosis, or edema  PSYCH: AAOx3  NEUROLOGY: non-focal  SKIN: No rashes or lesions    LABS:                        12.4   11.48 )-----------( 326      ( 13 Jun 2023 06:35 )             39.5     06-13    138  |  105  |  30<H>  ----------------------------<  207<H>  4.7   |  24  |  0.97    Ca    9.6      13 Jun 2023 06:31  Phos  2.1     06-12  Mg     2.0     06-12                RADIOLOGY & ADDITIONAL TESTS:    Imaging Personally Reviewed:    Consultant(s) Notes Reviewed:      Care Discussed with Consultants/Other Providers:

## 2023-06-13 NOTE — PROGRESS NOTE ADULT - ASSESSMENT
Assessment: 83y (1940) woman with a PMHx significant for RA, DM, HTN, HLD presenting with AMS. Code stroke called 1:06pm. LKW 8am when eating breakfast. Patient was golfing this morning when she apparently became dizzy and altered (per EMS). Patient reports she had double vision, now resolved. NIHSS 2 for slight LUE drift, partial field cut. Patient was difficult to follow commands due to lethargy and seemed minorly confused. Upon re-examination patient intermittently continues to fall asleep. She says she feels very tired. Unprompted pt states "I think I'm having a UTI". She admits to increased frequency, urgency, and possibly an episode of incontinence. When asked if this feels similar to prior UTIs she says yes. Glucose significant for 574. UA significant for LE (large), WBC (37), glucose 1000. 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   CTA Head and Neck 6/10: No large vessel occlusion or significant stenosis.  MRI Head no con 6/13: Ischemic white matter disease and atrophy typical for age. No evidence of infarction    Impression: AMS, lethargy, urinary symptoms 2/2 UTI & hyperglycemia. Patient's mental status has improved, back to baseline. Blurry vision and double vision resolved.      Recommendations:  [] MRI brain result above  [] labs: A1c (11.4), TSH (3.05), free thyroxine (1.2). UA(+), UTox (-)  [] UTI & hyperglycemia management per primary team  [] continue follow up with endocrinology team  [] Patient can obtain EEG outpatient with her neurologist Dr. Malin, she has an appointment this Friday.  [] No further inpatient neurology recommendation, will sign off, please call consult service [phone] with any questions.      Plans discussed with neurology attending, Dr. Tellez

## 2023-06-13 NOTE — DISCHARGE NOTE PROVIDER - NSDCMRMEDTOKEN_GEN_ALL_CORE_FT
calcipotriene 0.005% topical cream: Apply topically to affected area once a day as needed  celecoxib 200 mg oral capsule: 1 cap(s) orally once a day as needed  DULoxetine 30 mg oral delayed release capsule: 1 cap(s) orally once a day - total daily dose 90mg  DULoxetine 60 mg oral delayed release capsule: 1 cap(s) orally once a day - total daily dose 90mg  gabapentin 800 mg oral tablet: 1 tab(s) orally 2 times a day  levothyroxine 112 mcg (0.112 mg) oral tablet: 1 tab(s) orally once a day  losartan 25 mg oral tablet: 1 tab(s) orally once a day  metFORMIN 500 mg oral tablet, extended release: 2 tab(s) orally once a day (in the evening)  Kyaw 128 ophthalmic ointment: Apply to each eye once a day (at bedtime)  nortriptyline 10 mg oral capsule: 2 cap(s) orally once a day (at bedtime)  omeprazole 40 mg oral delayed release capsule: 1 cap(s) orally once a day  Rolling Walker: Rolling Walker 1x  Rolling Walker: Use as directed  ICD 10: M62.81  Systane ophthalmic solution: 1 drop(s) in each eye 4 times a day as needed  zolpidem 10 mg oral tablet: 1 tab(s) orally once a day (at bedtime) as needed   aspirin 81 mg oral delayed release tablet: 1 tab(s) orally once a day  atorvastatin 80 mg oral tablet: 1 tab(s) orally once a day (at bedtime)  DULoxetine 30 mg oral delayed release capsule: 1 cap(s) orally once a day - total daily dose 90mg  DULoxetine 60 mg oral delayed release capsule: 1 cap(s) orally once a day - total daily dose 90mg  gabapentin 800 mg oral tablet: 1 tab(s) orally 2 times a day  levothyroxine 112 mcg (0.112 mg) oral tablet: 1 tab(s) orally once a day  lisinopril 5 mg oral tablet: 1 tab(s) orally once a day  metFORMIN 500 mg oral tablet, extended release: 2 tab(s) orally once a day (in the evening)  Kyaw 128 ophthalmic ointment: Apply to each eye once a day (at bedtime)  nortriptyline 10 mg oral capsule: 1 cap(s) orally once a day (at bedtime)  omeprazole 40 mg oral delayed release capsule: 1 cap(s) orally once a day  Rolling Walker: Use as directed  ICD 10: M62.81  Systane ophthalmic solution: 1 drop(s) in each eye 4 times a day as needed  zolpidem 5 mg oral tablet: 1 tab(s) orally once a day (at bedtime) As needed Insomnia

## 2023-06-13 NOTE — PROGRESS NOTE ADULT - ASSESSMENT
83-year-old female with a past medical history of hypertension, diabetes on metformin who presents emergency department complaining of dizziness  arriving via EMS.  Patient states that she was golfing with her friends today at 9 AM when she suddenly felt dizzy and had double vision.  Patient states that she felt like the room was spinning and she had blurry vision.  Patient states that the symptoms have since subsided.  Patient also believes that she has a UTI, has not seen a doctor for this. Patient arrived with lethargy and finger stick of 500.    A/P  recurrent vertigo prob 2/2 UTI and uncontrolled DM. now resolved  ptn has poor compliance w DM meds AND diet. compliance was d/w ptn. she completed ABx for UTI. SHE will need NEURO f/u w her neurologist for EEG. she had MRI done here. no acute abn seen.. there is no urgenct indication for EEG to be done on inptn basis    change to po DM meds  HA1C : 11.5 2/2 non compliance  CTH neg, MR brain reviewed no acute abn. ischemic white matter dz. cont statin and ASA  dvt ppx po xarelto

## 2023-06-13 NOTE — DISCHARGE NOTE NURSING/CASE MANAGEMENT/SOCIAL WORK - PATIENT PORTAL LINK FT
You can access the FollowMyHealth Patient Portal offered by [facility] by registering at the following website: [email or web address]. By joining [facility]’s FollowMyHealth portal, you will also be able to view your health information using other applications (apps) compatible with our system.

## 2023-06-13 NOTE — DISCHARGE NOTE NURSING/CASE MANAGEMENT/SOCIAL WORK - NSDCPEFALRISK_GEN_ALL_CORE
For information on Fall & Injury Prevention, visit: [email or web address] OR  [email or web address] OR  https://www.cdc.gov/steadi/patient.html

## 2023-07-07 ENCOUNTER — RX RENEWAL (OUTPATIENT)
Age: 83
End: 2023-07-07

## 2023-07-07 RX ORDER — DULOXETINE HYDROCHLORIDE 60 MG/1
60 CAPSULE, DELAYED RELEASE PELLETS ORAL
Qty: 90 | Refills: 3 | Status: ACTIVE | COMMUNITY
Start: 2018-10-09 | End: 1900-01-01

## 2023-08-04 ENCOUNTER — APPOINTMENT (OUTPATIENT)
Dept: RHEUMATOLOGY | Facility: CLINIC | Age: 83
End: 2023-08-04
Payer: MEDICARE

## 2023-08-04 VITALS
HEART RATE: 93 BPM | WEIGHT: 150 LBS | OXYGEN SATURATION: 98 % | TEMPERATURE: 97.1 F | RESPIRATION RATE: 16 BRPM | DIASTOLIC BLOOD PRESSURE: 75 MMHG | BODY MASS INDEX: 25.61 KG/M2 | HEIGHT: 64 IN | SYSTOLIC BLOOD PRESSURE: 155 MMHG

## 2023-08-04 DIAGNOSIS — M48.061 SPINAL STENOSIS, LUMBAR REGION WITHOUT NEUROGENIC CLAUDICATION: ICD-10-CM

## 2023-08-04 DIAGNOSIS — M17.9 OSTEOARTHRITIS OF KNEE, UNSPECIFIED: ICD-10-CM

## 2023-08-04 LAB
ALBUMIN SERPL ELPH-MCNC: 4.1 G/DL
ALP BLD-CCNC: 121 U/L
ALT SERPL-CCNC: 19 U/L
ANION GAP SERPL CALC-SCNC: 14 MMOL/L
AST SERPL-CCNC: 21 U/L
BASOPHILS # BLD AUTO: 0.08 K/UL
BASOPHILS NFR BLD AUTO: 0.8 %
BILIRUB SERPL-MCNC: 0.2 MG/DL
BUN SERPL-MCNC: 27 MG/DL
CALCIUM SERPL-MCNC: 10.5 MG/DL
CHLORIDE SERPL-SCNC: 103 MMOL/L
CO2 SERPL-SCNC: 26 MMOL/L
CREAT SERPL-MCNC: 0.93 MG/DL
CRP SERPL HS-MCNC: 3.75 MG/L
EGFR: 62 ML/MIN/1.73M2
EOSINOPHIL # BLD AUTO: 0.33 K/UL
EOSINOPHIL NFR BLD AUTO: 3.3 %
ERYTHROCYTE [SEDIMENTATION RATE] IN BLOOD BY WESTERGREN METHOD: 26 MM/HR
GLUCOSE SERPL-MCNC: 156 MG/DL
HCT VFR BLD CALC: 39 %
HGB BLD-MCNC: 12.6 G/DL
IMM GRANULOCYTES NFR BLD AUTO: 0.3 %
LYMPHOCYTES # BLD AUTO: 2.87 K/UL
LYMPHOCYTES NFR BLD AUTO: 29 %
MAN DIFF?: NORMAL
MCHC RBC-ENTMCNC: 29.5 PG
MCHC RBC-ENTMCNC: 32.3 GM/DL
MCV RBC AUTO: 91.3 FL
MONOCYTES # BLD AUTO: 0.47 K/UL
MONOCYTES NFR BLD AUTO: 4.8 %
NEUTROPHILS # BLD AUTO: 6.11 K/UL
NEUTROPHILS NFR BLD AUTO: 61.8 %
PLATELET # BLD AUTO: 333 K/UL
POTASSIUM SERPL-SCNC: 4.9 MMOL/L
PROT SERPL-MCNC: 6.4 G/DL
RBC # BLD: 4.27 M/UL
RBC # FLD: 14.1 %
SODIUM SERPL-SCNC: 142 MMOL/L
WBC # FLD AUTO: 9.89 K/UL

## 2023-08-04 PROCEDURE — 99214 OFFICE O/P EST MOD 30 MIN: CPT

## 2023-08-04 NOTE — CONSULT LETTER
[Dear  ___] : Dear  [unfilled], [Courtesy Letter:] : I had the pleasure of seeing your patient, [unfilled], in my office today. [Please see my note below.] : Please see my note below. [Consult Closing:] : Thank you very much for allowing me to participate in the care of this patient.  If you have any questions, please do not hesitate to contact me. [Sincerely,] : Sincerely, [FreeTextEntry2] : Orlando Dudley MD\par  Phone: [phone] [FreeTextEntry3] : Radha Lombardi MD\par  Director, [facility]cul[facility]os[facility], \par  Rheumatology Division, Department of Medicine\par  , \par  [employer] \par  at [facility]\par  \par  [address], [address]\par  [place] [address]\par  Tel: [phone]\par

## 2023-08-04 NOTE — PHYSICAL EXAM
[General Appearance - Alert] : alert [General Appearance - In No Acute Distress] : in no acute distress [General Appearance - Well Nourished] : well nourished [General Appearance - Well Developed] : well developed [Sclera] : the sclera and conjunctiva were normal [Outer Ear] : the ears and nose were normal in appearance [Nasal Cavity] : the nasal mucosa and septum were normal [FreeTextEntry1] : no thrsh, no oral ukcers [Respiration, Rhythm And Depth] : normal respiratory rhythm and effort [Auscultation Breath Sounds / Voice Sounds] : lungs were clear to auscultation bilaterally [Heart Sounds] : normal S1 and S2 [Heart Sounds Gallop] : no gallops [Murmurs] : no murmurs [Edema] : there was no peripheral edema [Bowel Sounds] : normal bowel sounds [Abdomen Soft] : soft [Abdomen Tenderness] : non-tender [Musculoskeletal - Swelling] : no joint swelling seen [] : no rash [No Focal Deficits] : no focal deficits [Oriented To Time, Place, And Person] : oriented to person, place, and time [Impaired Insight] : insight and judgment were intact

## 2023-08-04 NOTE — HISTORY OF PRESENT ILLNESS
[de-identified] : Last seen in July, 2022 [FreeTextEntry1] :  Interval history: --------------------- of Orencia  since 4/2023 had left clavicular fx - in July 2023 - after fall  in June 2023 was at the [facility] with UTI  had on Cymbalta 90 mg a day Dr Dudley - was started on Nortriptyline 20 mg hs  still feels quite depressed / sees psychologist.

## 2023-08-04 NOTE — ASSESSMENT
[FreeTextEntry1] : Rheumatoid arthritis   - in remission off Orencia sine 4/2023 DDD/ faucet arthropathy, s/p spinal sx Fibromyalgia knee OA , s/p TKR 11/6/17 TB screen in May2022 - neg  - labs for hep  - start Plaquenil 200 mg BId0 discussed side effects/ ophthalmology eval yearly - continue Cymbalta 90 mg a day - PT  RTO 3-4  months or earlier if needed.

## 2023-08-10 LAB
AA PROT SER-MCNC: 1.4 UG/ML
BIOMARKER COMMENT: NORMAL
CRP SERPL-MCNC: 4.3 MG/L
EGF SERPL-MCNC: 250 PG/ML
FOOTNOTE: NORMAL
IL6 SERPL-MCNC: 14 PG/ML
LEPTIN SERPL-MCNC: 18 NG/ML
Lab: NORMAL
Lab: NORMAL
MMP-1 SERPL-MCNC: 5.2 NG/ML
MMP-3 SERPL-MCNC: 39 NG/ML
RA DAS LEVEL QL VECTRADA: NORMAL
RESISTIN SERPL-MCNC: 6 NG/ML
RISK OF RADIOGRAPHIC PROGRESS: 3 %
TNFRSF1A SERPL-MCNC: 1.6 NG/ML
VAP-1 SERPL-MCNC: 0.9 UG/ML
VECTRA SCORE: 29
VEGF-A SERPL-MCNC: 260 PG/ML
YKL-40 RESULT: 120 NG/ML

## 2023-08-16 ENCOUNTER — RX RENEWAL (OUTPATIENT)
Age: 83
End: 2023-08-16

## 2023-09-06 ENCOUNTER — APPOINTMENT (OUTPATIENT)
Dept: OPHTHALMOLOGY | Facility: CLINIC | Age: 83
End: 2023-09-06
Payer: MEDICARE

## 2023-09-06 ENCOUNTER — NON-APPOINTMENT (OUTPATIENT)
Age: 83
End: 2023-09-06

## 2023-09-06 PROCEDURE — 92134 CPTRZ OPH DX IMG PST SGM RTA: CPT

## 2023-09-06 PROCEDURE — 92014 COMPRE OPH EXAM EST PT 1/>: CPT

## 2023-12-28 NOTE — ED ADULT NURSE NOTE - CHPI ED SYMPTOMS NEG
8 (severe pain)
no dizziness/no fever/no chills/no numbness/no vomiting/no tingling/no weakness/no decreased eating/drinking/no pain/no nausea

## 2024-01-10 ENCOUNTER — APPOINTMENT (OUTPATIENT)
Dept: OPHTHALMOLOGY | Facility: CLINIC | Age: 84
End: 2024-01-10
Payer: MEDICARE

## 2024-01-10 PROCEDURE — 92012 INTRM OPH EXAM EST PATIENT: CPT

## 2024-03-01 ENCOUNTER — EMERGENCY (EMERGENCY)
Facility: HOSPITAL | Age: 84
LOS: 1 days | Discharge: HOME | End: 2024-03-01
Attending: EMERGENCY MEDICINE
Payer: MEDICARE

## 2024-03-01 VITALS
HEIGHT: 64 IN | HEART RATE: 95 BPM | SYSTOLIC BLOOD PRESSURE: 152 MMHG | OXYGEN SATURATION: 96 % | TEMPERATURE: 98 F | RESPIRATION RATE: 18 BRPM | WEIGHT: 136.03 LBS | DIASTOLIC BLOOD PRESSURE: 103 MMHG

## 2024-03-01 VITALS
DIASTOLIC BLOOD PRESSURE: 88 MMHG | SYSTOLIC BLOOD PRESSURE: 177 MMHG | HEART RATE: 77 BPM | OXYGEN SATURATION: 99 % | RESPIRATION RATE: 19 BRPM

## 2024-03-01 DIAGNOSIS — H26.9 UNSPECIFIED CATARACT: Chronic | ICD-10-CM

## 2024-03-01 DIAGNOSIS — Z98.89 OTHER SPECIFIED POSTPROCEDURAL STATES: Chronic | ICD-10-CM

## 2024-03-01 LAB
ADD ON TEST-SPECIMEN IN LAB: SIGNIFICANT CHANGE UP
ALBUMIN SERPL ELPH-MCNC: 4 G/DL — SIGNIFICANT CHANGE UP (ref 3.3–5)
ALP SERPL-CCNC: 87 U/L — SIGNIFICANT CHANGE UP (ref 40–120)
ALT FLD-CCNC: 13 U/L — SIGNIFICANT CHANGE UP (ref 10–45)
ANION GAP SERPL CALC-SCNC: 16 MMOL/L — SIGNIFICANT CHANGE UP (ref 5–17)
APPEARANCE UR: CLEAR — SIGNIFICANT CHANGE UP
AST SERPL-CCNC: 19 U/L — SIGNIFICANT CHANGE UP (ref 10–40)
BACTERIA # UR AUTO: ABNORMAL /HPF
BASE EXCESS BLDV CALC-SCNC: -0.1 MMOL/L — SIGNIFICANT CHANGE UP (ref -2–3)
BASOPHILS # BLD AUTO: 0.1 K/UL — SIGNIFICANT CHANGE UP (ref 0–0.2)
BASOPHILS NFR BLD AUTO: 1.2 % — SIGNIFICANT CHANGE UP (ref 0–2)
BILIRUB SERPL-MCNC: 0.3 MG/DL — SIGNIFICANT CHANGE UP (ref 0.2–1.2)
BILIRUB UR-MCNC: NEGATIVE — SIGNIFICANT CHANGE UP
BUN SERPL-MCNC: 25 MG/DL — HIGH (ref 7–23)
CA-I SERPL-SCNC: 1.28 MMOL/L — SIGNIFICANT CHANGE UP (ref 1.15–1.33)
CALCIUM SERPL-MCNC: 10.1 MG/DL — SIGNIFICANT CHANGE UP (ref 8.4–10.5)
CAST: 14 /LPF — HIGH (ref 0–4)
CHLORIDE BLDV-SCNC: 107 MMOL/L — SIGNIFICANT CHANGE UP (ref 96–108)
CHLORIDE SERPL-SCNC: 105 MMOL/L — SIGNIFICANT CHANGE UP (ref 96–108)
CO2 BLDV-SCNC: 27 MMOL/L — HIGH (ref 22–26)
CO2 SERPL-SCNC: 21 MMOL/L — LOW (ref 22–31)
COLOR SPEC: YELLOW — SIGNIFICANT CHANGE UP
CREAT SERPL-MCNC: 1.19 MG/DL — SIGNIFICANT CHANGE UP (ref 0.5–1.3)
DIFF PNL FLD: NEGATIVE — SIGNIFICANT CHANGE UP
EGFR: 46 ML/MIN/1.73M2 — LOW
EOSINOPHIL # BLD AUTO: 0.63 K/UL — HIGH (ref 0–0.5)
EOSINOPHIL NFR BLD AUTO: 7.7 % — HIGH (ref 0–6)
FLUAV AG NPH QL: SIGNIFICANT CHANGE UP
FLUBV AG NPH QL: SIGNIFICANT CHANGE UP
GAS PNL BLDV: 139 MMOL/L — SIGNIFICANT CHANGE UP (ref 136–145)
GAS PNL BLDV: SIGNIFICANT CHANGE UP
GLUCOSE BLDV-MCNC: 150 MG/DL — HIGH (ref 70–99)
GLUCOSE SERPL-MCNC: 149 MG/DL — HIGH (ref 70–99)
GLUCOSE UR QL: 250 MG/DL
HCO3 BLDV-SCNC: 25 MMOL/L — SIGNIFICANT CHANGE UP (ref 22–29)
HCT VFR BLD CALC: 37.3 % — SIGNIFICANT CHANGE UP (ref 34.5–45)
HCT VFR BLDA CALC: 37 % — SIGNIFICANT CHANGE UP (ref 34.5–46.5)
HGB BLD CALC-MCNC: 12.2 G/DL — SIGNIFICANT CHANGE UP (ref 11.7–16.1)
HGB BLD-MCNC: 12.1 G/DL — SIGNIFICANT CHANGE UP (ref 11.5–15.5)
IMM GRANULOCYTES NFR BLD AUTO: 0.1 % — SIGNIFICANT CHANGE UP (ref 0–0.9)
KETONES UR-MCNC: 15 MG/DL
LACTATE BLDV-MCNC: 1.2 MMOL/L — SIGNIFICANT CHANGE UP (ref 0.5–2)
LEUKOCYTE ESTERASE UR-ACNC: ABNORMAL
LIDOCAIN IGE QN: 32 U/L — SIGNIFICANT CHANGE UP (ref 7–60)
LYMPHOCYTES # BLD AUTO: 2.22 K/UL — SIGNIFICANT CHANGE UP (ref 1–3.3)
LYMPHOCYTES # BLD AUTO: 27 % — SIGNIFICANT CHANGE UP (ref 13–44)
MCHC RBC-ENTMCNC: 27.9 PG — SIGNIFICANT CHANGE UP (ref 27–34)
MCHC RBC-ENTMCNC: 32.4 GM/DL — SIGNIFICANT CHANGE UP (ref 32–36)
MCV RBC AUTO: 85.9 FL — SIGNIFICANT CHANGE UP (ref 80–100)
MONOCYTES # BLD AUTO: 0.62 K/UL — SIGNIFICANT CHANGE UP (ref 0–0.9)
MONOCYTES NFR BLD AUTO: 7.6 % — SIGNIFICANT CHANGE UP (ref 2–14)
NEUTROPHILS # BLD AUTO: 4.63 K/UL — SIGNIFICANT CHANGE UP (ref 1.8–7.4)
NEUTROPHILS NFR BLD AUTO: 56.4 % — SIGNIFICANT CHANGE UP (ref 43–77)
NITRITE UR-MCNC: NEGATIVE — SIGNIFICANT CHANGE UP
NRBC # BLD: 0 /100 WBCS — SIGNIFICANT CHANGE UP (ref 0–0)
PCO2 BLDV: 44 MMHG — HIGH (ref 39–42)
PH BLDV: 7.37 — SIGNIFICANT CHANGE UP (ref 7.32–7.43)
PH UR: 6 — SIGNIFICANT CHANGE UP (ref 5–8)
PLATELET # BLD AUTO: 334 K/UL — SIGNIFICANT CHANGE UP (ref 150–400)
PO2 BLDV: 24 MMHG — LOW (ref 25–45)
POTASSIUM BLDV-SCNC: 3.6 MMOL/L — SIGNIFICANT CHANGE UP (ref 3.5–5.1)
POTASSIUM SERPL-MCNC: 3.6 MMOL/L — SIGNIFICANT CHANGE UP (ref 3.5–5.3)
POTASSIUM SERPL-SCNC: 3.6 MMOL/L — SIGNIFICANT CHANGE UP (ref 3.5–5.3)
PROT SERPL-MCNC: 7.2 G/DL — SIGNIFICANT CHANGE UP (ref 6–8.3)
PROT UR-MCNC: 30 MG/DL
RBC # BLD: 4.34 M/UL — SIGNIFICANT CHANGE UP (ref 3.8–5.2)
RBC # FLD: 14.5 % — SIGNIFICANT CHANGE UP (ref 10.3–14.5)
RBC CASTS # UR COMP ASSIST: 2 /HPF — SIGNIFICANT CHANGE UP (ref 0–4)
REVIEW: SIGNIFICANT CHANGE UP
RSV RNA NPH QL NAA+NON-PROBE: SIGNIFICANT CHANGE UP
SAO2 % BLDV: 35.4 % — LOW (ref 67–88)
SARS-COV-2 RNA SPEC QL NAA+PROBE: SIGNIFICANT CHANGE UP
SODIUM SERPL-SCNC: 142 MMOL/L — SIGNIFICANT CHANGE UP (ref 135–145)
SP GR SPEC: >1.03 — HIGH (ref 1–1.03)
SQUAMOUS # UR AUTO: 6 /HPF — HIGH (ref 0–5)
UROBILINOGEN FLD QL: 0.2 MG/DL — SIGNIFICANT CHANGE UP (ref 0.2–1)
WBC # BLD: 8.21 K/UL — SIGNIFICANT CHANGE UP (ref 3.8–10.5)
WBC # FLD AUTO: 8.21 K/UL — SIGNIFICANT CHANGE UP (ref 3.8–10.5)
WBC UR QL: 11 /HPF — HIGH (ref 0–5)

## 2024-03-01 PROCEDURE — 81001 URINALYSIS AUTO W/SCOPE: CPT

## 2024-03-01 PROCEDURE — 82435 ASSAY OF BLOOD CHLORIDE: CPT

## 2024-03-01 PROCEDURE — 84295 ASSAY OF SERUM SODIUM: CPT

## 2024-03-01 PROCEDURE — 85014 HEMATOCRIT: CPT

## 2024-03-01 PROCEDURE — 99285 EMERGENCY DEPT VISIT HI MDM: CPT | Mod: 25

## 2024-03-01 PROCEDURE — 74177 CT ABD & PELVIS W/CONTRAST: CPT | Mod: 26,MC

## 2024-03-01 PROCEDURE — 85025 COMPLETE CBC W/AUTO DIFF WBC: CPT

## 2024-03-01 PROCEDURE — 87637 SARSCOV2&INF A&B&RSV AMP PRB: CPT

## 2024-03-01 PROCEDURE — 83690 ASSAY OF LIPASE: CPT

## 2024-03-01 PROCEDURE — 85018 HEMOGLOBIN: CPT

## 2024-03-01 PROCEDURE — 93005 ELECTROCARDIOGRAM TRACING: CPT

## 2024-03-01 PROCEDURE — 74177 CT ABD & PELVIS W/CONTRAST: CPT | Mod: MC

## 2024-03-01 PROCEDURE — 80053 COMPREHEN METABOLIC PANEL: CPT

## 2024-03-01 PROCEDURE — 83605 ASSAY OF LACTIC ACID: CPT

## 2024-03-01 PROCEDURE — 84132 ASSAY OF SERUM POTASSIUM: CPT

## 2024-03-01 PROCEDURE — 82803 BLOOD GASES ANY COMBINATION: CPT

## 2024-03-01 PROCEDURE — 96374 THER/PROPH/DIAG INJ IV PUSH: CPT | Mod: XU

## 2024-03-01 PROCEDURE — 82330 ASSAY OF CALCIUM: CPT

## 2024-03-01 PROCEDURE — 84443 ASSAY THYROID STIM HORMONE: CPT

## 2024-03-01 PROCEDURE — 82947 ASSAY GLUCOSE BLOOD QUANT: CPT

## 2024-03-01 PROCEDURE — 99285 EMERGENCY DEPT VISIT HI MDM: CPT

## 2024-03-01 RX ORDER — SODIUM CHLORIDE 9 MG/ML
1000 INJECTION INTRAMUSCULAR; INTRAVENOUS; SUBCUTANEOUS ONCE
Refills: 0 | Status: COMPLETED | OUTPATIENT
Start: 2024-03-01 | End: 2024-03-01

## 2024-03-01 RX ORDER — CEFPODOXIME PROXETIL 100 MG
1 TABLET ORAL
Qty: 20 | Refills: 0
Start: 2024-03-01 | End: 2024-03-10

## 2024-03-01 RX ORDER — CEFTRIAXONE 500 MG/1
1000 INJECTION, POWDER, FOR SOLUTION INTRAMUSCULAR; INTRAVENOUS ONCE
Refills: 0 | Status: COMPLETED | OUTPATIENT
Start: 2024-03-01 | End: 2024-03-01

## 2024-03-01 RX ADMIN — CEFTRIAXONE 100 MILLIGRAM(S): 500 INJECTION, POWDER, FOR SOLUTION INTRAMUSCULAR; INTRAVENOUS at 22:18

## 2024-03-01 RX ADMIN — SODIUM CHLORIDE 1000 MILLILITER(S): 9 INJECTION INTRAMUSCULAR; INTRAVENOUS; SUBCUTANEOUS at 16:33

## 2024-03-01 NOTE — ED ADULT NURSE NOTE - OBJECTIVE STATEMENT
Female 84 years old with history of RA came in for fever. weakness, dizziness and poor appetite for 3-4 days. Pt reports GI symptoms a week ago. Denies chest pain or sob, cough, chills or urinary symptoms. Pt saw her PCP 3 days ago and was told her kidney function is abnormal. Spoke with PCP today and sent to ED for further evaluation. Safety and comfort maintained. Call bell within easy reach. Will continue to monitor.

## 2024-03-01 NOTE — ED ADULT NURSE NOTE - NSFALLHARMRISKINTERV_ED_ALL_ED

## 2024-03-01 NOTE — ED PROVIDER NOTE - RAPID ASSESSMENT
85yo F with PMH RA (no longer on meds), presenting with subjective fever, dizziness, generalized weakness, fatigue, poor appetite. Had GI illness that began 1 week ago, saw PCP 3 days ago and told kidney function was abnormal. No longer having any n/v/d. Spoke with PCP today Dr. Ricci Diaz and sent to ED. Denies urinary symptoms, cp, sob, abdominal pain.    KIZZY Plata: Patient seen by myself in triage area for rapid assessment only. Full H&P and complete work-up to be performed in main emergency department by ED providers.

## 2024-03-01 NOTE — ED PROVIDER NOTE - PROGRESS NOTE DETAILS
Jw PGY2: Labs remarkable for UA consistent with urinary tract infection.  CT abdomen/pelvis shows evidence of new left lower lobe 1 cm pulmonary nodule requiring outpatient CT follow-up in 3 months and nonobstructing 1 cm right renal calculus.  Both findings discussed with patient and daughter.  Patient able to tolerate p.o. intake and walk.  Patient will receive ceftriaxone will be discharged home on cefpodoxime.  Patient and daughter amenable to plan.

## 2024-03-01 NOTE — ED PROVIDER NOTE - OBJECTIVE STATEMENT
84F with hx hypothyroidism, GERD, presents to the ED complaining of nausea, decreased appetite, generalized weakness and fatigue for the past 3-4 days. Associated decreased PO intake and lightheadedness. She was evaluated by PMD Dr. Diaz 3 days ago and was told her kidney function was irregular. She also endorses subjective fever. Denies headache, chest pain, shortness of breath, vomiting, urinary symptoms. She endorses intermittent diarrhea, submitted a stool study to her GI doctor earlier in the week, unsure of the results. Denies diarrhea currently. Daughter states over the last 8 months she has had a significant weight loss. No recent endoscopy/colonoscopy or imaging of her abdomen. She states she previously had a UTI last summer and was hospitalized. She lives alone at home and ambulates without assistance but notes she has balance issues due to a pinched nerve.

## 2024-03-01 NOTE — ED PROVIDER NOTE - CLINICAL SUMMARY MEDICAL DECISION MAKING FREE TEXT BOX
84F with hx hypothyroidism, gerd, presents with nausea, generalized weakness, and decreased PO intake, with associated lightheadedness. Exam is nonfocal. Patient is nontoxic appearing. At the time of my evaluation, labs ordered from [facility] have returned 84F with hx hypothyroidism, gerd, presents with nausea, generalized weakness, and decreased PO intake, with associated lightheadedness. Exam is nonfocal. Patient is nontoxic appearing. At the time of my evaluation, labs ordered from [facility] have returned, notable for BUN 25, no leukocytosis, normal h/h, negative flu/covid. Will add on TSH to evaluate for thyroid dysfunction, CT abd/pel to eval for colitis, malignancy, diverticulitis, as well as stool studies. Will hydrate with fluids and reassess. Pending urine studies as well. 84F with hx hypothyroidism, GERD ,, presents with nausea, generalized weakness, and decreased PO intake, with associated lightheadedness. Exam is nonfocal. Patient is nontoxic appearing. At the time of my evaluation, labs ordered from [facility] have returned, notable for BUN 25, no leukocytosis, normal h/h, negative flu/covid. Will add on TSH to evaluate for thyroid dysfunction, CT abd/pel to eval for colitis, malignancy, diverticulitis, as well as stool studies. Will hydrate with fluids and reassess. Pending urine studies as well. reassess ZR

## 2024-03-01 NOTE — ED PROVIDER NOTE - NSFOLLOWUPINSTRUCTIONS_ED_ALL_ED_FT
Reason for ED Visit:  - Nausea, decreased food intake, weakness/fatigue    Findings/Diagnosis:   - Urinary tract infection   - 1 cm right kidney stone, nonobstructive   - 1 cm lung nodule requiring CT scan follow-up in 3-month    Pending results:   - Thyroid hormone level > please follow-up    Please return to the ED immediately for any new, worsening, or concerning symptoms including, but not limited to:   - Persistent nausea/vomiting   - Persistent fevers   - Lightheadedness/dizziness   - Right-sided pain and blood in urine    Please take the following medications at home:   - Cefpodoxime 200 mg twice daily for 10 days   - Acetaminophen (Tylenol) every 6 hours as needed for pain    Please follow up with your primary care provider regarding this ED visit.    Thank you for choosing us for your care.

## 2024-03-01 NOTE — ED PROVIDER NOTE - PATIENT PORTAL LINK FT
You can access the FollowMyHealth Patient Portal offered by [facility] by registering at the following website: [email or web address]. By joining [facility]’s FollowMyHealth portal, you will also be able to view your health information using other applications (apps) compatible with our system.

## 2024-03-01 NOTE — ED PROVIDER NOTE - PHYSICAL EXAMINATION
GEN: NAD, awake, eyes open spontaneously  EYES: normal conjunctiva, perrl  ENT: NCAT, MMM, Trachea midline  CHEST/LUNGS: Non-tachypneic, CTAB, bilateral breath sounds  CARDIAC: Non-tachycardic, normal perfusion  ABDOMEN: Soft, NTND, No rebound/guarding  MSK: No edema, no gross deformity of extremities  SKIN: No rashes, no petechiae, no vesicles  NEURO: CN grossly intact, normal coordination, no focal motor or sensory deficits  PSYCH: Alert, appropriate, cooperative, with capacity and insight

## 2024-03-01 NOTE — ED PROVIDER NOTE - NS ED ROS FT
CONSTITUTIONAL: + fevers, + chills, + lightheadedness, no dizziness  EYES: no visual changes, no eye pain  EARS: no ear drainage, no ear pain, no change in hearing  NOSE: no nasal congestion  MOUTH/THROAT: no sore throat  CV: No chest pain, no palpitations  RESP: No SOB, no cough  GI: + nausea, no v/d, no abd pain  : no dysuria, no hematuria, no flank pain  MSK: no back pain, no extremity pain  SKIN: no rashes  NEURO: no headache, no focal weakness, no decreased sensation/parasthesias

## 2024-03-02 LAB — TSH SERPL-MCNC: 0.59 UIU/ML — SIGNIFICANT CHANGE UP (ref 0.27–4.2)

## 2024-03-05 ENCOUNTER — INPATIENT (INPATIENT)
Facility: HOSPITAL | Age: 84
LOS: 2 days | Discharge: HOME | DRG: 640 | End: 2024-03-08
Attending: INTERNAL MEDICINE | Admitting: INTERNAL MEDICINE
Payer: MEDICARE

## 2024-03-05 VITALS
DIASTOLIC BLOOD PRESSURE: 90 MMHG | OXYGEN SATURATION: 99 % | TEMPERATURE: 98 F | SYSTOLIC BLOOD PRESSURE: 176 MMHG | RESPIRATION RATE: 16 BRPM | WEIGHT: 125 LBS | HEART RATE: 66 BPM | HEIGHT: 64 IN

## 2024-03-05 DIAGNOSIS — Z98.89 OTHER SPECIFIED POSTPROCEDURAL STATES: Chronic | ICD-10-CM

## 2024-03-05 DIAGNOSIS — H26.9 UNSPECIFIED CATARACT: Chronic | ICD-10-CM

## 2024-03-05 DIAGNOSIS — R53.1 WEAKNESS: ICD-10-CM

## 2024-03-05 LAB
ALBUMIN SERPL ELPH-MCNC: 3.8 G/DL — SIGNIFICANT CHANGE UP (ref 3.3–5)
ALP SERPL-CCNC: 85 U/L — SIGNIFICANT CHANGE UP (ref 40–120)
ALT FLD-CCNC: 11 U/L — SIGNIFICANT CHANGE UP (ref 10–45)
ANION GAP SERPL CALC-SCNC: 10 MMOL/L — SIGNIFICANT CHANGE UP (ref 5–17)
APTT BLD: 30.2 SEC — SIGNIFICANT CHANGE UP (ref 24.5–35.6)
AST SERPL-CCNC: 15 U/L — SIGNIFICANT CHANGE UP (ref 10–40)
BASOPHILS # BLD AUTO: 0.1 K/UL — SIGNIFICANT CHANGE UP (ref 0–0.2)
BASOPHILS NFR BLD AUTO: 1.4 % — SIGNIFICANT CHANGE UP (ref 0–2)
BILIRUB SERPL-MCNC: 0.3 MG/DL — SIGNIFICANT CHANGE UP (ref 0.2–1.2)
BUN SERPL-MCNC: 24 MG/DL — HIGH (ref 7–23)
CALCIUM SERPL-MCNC: 10.6 MG/DL — HIGH (ref 8.4–10.5)
CHLORIDE SERPL-SCNC: 108 MMOL/L — SIGNIFICANT CHANGE UP (ref 96–108)
CO2 SERPL-SCNC: 26 MMOL/L — SIGNIFICANT CHANGE UP (ref 22–31)
CREAT SERPL-MCNC: 1.26 MG/DL — SIGNIFICANT CHANGE UP (ref 0.5–1.3)
EGFR: 43 ML/MIN/1.73M2 — LOW
EOSINOPHIL # BLD AUTO: 0.62 K/UL — HIGH (ref 0–0.5)
EOSINOPHIL NFR BLD AUTO: 8.4 % — HIGH (ref 0–6)
FLUAV AG NPH QL: SIGNIFICANT CHANGE UP
FLUBV AG NPH QL: SIGNIFICANT CHANGE UP
GLUCOSE SERPL-MCNC: 94 MG/DL — SIGNIFICANT CHANGE UP (ref 70–99)
HCT VFR BLD CALC: 33.1 % — LOW (ref 34.5–45)
HGB BLD-MCNC: 11.3 G/DL — LOW (ref 11.5–15.5)
IMM GRANULOCYTES NFR BLD AUTO: 0.3 % — SIGNIFICANT CHANGE UP (ref 0–0.9)
INR BLD: 1.14 RATIO — SIGNIFICANT CHANGE UP (ref 0.85–1.18)
LYMPHOCYTES # BLD AUTO: 2.25 K/UL — SIGNIFICANT CHANGE UP (ref 1–3.3)
LYMPHOCYTES # BLD AUTO: 30.4 % — SIGNIFICANT CHANGE UP (ref 13–44)
MAGNESIUM SERPL-MCNC: 2 MG/DL — SIGNIFICANT CHANGE UP (ref 1.6–2.6)
MCHC RBC-ENTMCNC: 28.8 PG — SIGNIFICANT CHANGE UP (ref 27–34)
MCHC RBC-ENTMCNC: 34.1 GM/DL — SIGNIFICANT CHANGE UP (ref 32–36)
MCV RBC AUTO: 84.2 FL — SIGNIFICANT CHANGE UP (ref 80–100)
MONOCYTES # BLD AUTO: 0.59 K/UL — SIGNIFICANT CHANGE UP (ref 0–0.9)
MONOCYTES NFR BLD AUTO: 8 % — SIGNIFICANT CHANGE UP (ref 2–14)
NEUTROPHILS # BLD AUTO: 3.82 K/UL — SIGNIFICANT CHANGE UP (ref 1.8–7.4)
NEUTROPHILS NFR BLD AUTO: 51.5 % — SIGNIFICANT CHANGE UP (ref 43–77)
NRBC # BLD: 0 /100 WBCS — SIGNIFICANT CHANGE UP (ref 0–0)
PHOSPHATE SERPL-MCNC: 3.4 MG/DL — SIGNIFICANT CHANGE UP (ref 2.5–4.5)
PLATELET # BLD AUTO: 299 K/UL — SIGNIFICANT CHANGE UP (ref 150–400)
POTASSIUM SERPL-MCNC: 3.4 MMOL/L — LOW (ref 3.5–5.3)
POTASSIUM SERPL-SCNC: 3.4 MMOL/L — LOW (ref 3.5–5.3)
PROT SERPL-MCNC: 6.5 G/DL — SIGNIFICANT CHANGE UP (ref 6–8.3)
PROTHROM AB SERPL-ACNC: 12.5 SEC — SIGNIFICANT CHANGE UP (ref 9.5–13)
RBC # BLD: 3.93 M/UL — SIGNIFICANT CHANGE UP (ref 3.8–5.2)
RBC # FLD: 14.5 % — SIGNIFICANT CHANGE UP (ref 10.3–14.5)
RSV RNA NPH QL NAA+NON-PROBE: SIGNIFICANT CHANGE UP
SARS-COV-2 RNA SPEC QL NAA+PROBE: SIGNIFICANT CHANGE UP
SODIUM SERPL-SCNC: 144 MMOL/L — SIGNIFICANT CHANGE UP (ref 135–145)
WBC # BLD: 7.4 K/UL — SIGNIFICANT CHANGE UP (ref 3.8–10.5)
WBC # FLD AUTO: 7.4 K/UL — SIGNIFICANT CHANGE UP (ref 3.8–10.5)

## 2024-03-05 PROCEDURE — 99285 EMERGENCY DEPT VISIT HI MDM: CPT | Mod: GC

## 2024-03-05 PROCEDURE — 71250 CT THORAX DX C-: CPT | Mod: 26

## 2024-03-05 PROCEDURE — 71045 X-RAY EXAM CHEST 1 VIEW: CPT | Mod: 26

## 2024-03-05 RX ORDER — LEVOTHYROXINE SODIUM 125 MCG
1 TABLET ORAL
Refills: 0 | DISCHARGE

## 2024-03-05 RX ORDER — CEFTRIAXONE 500 MG/1
1000 INJECTION, POWDER, FOR SOLUTION INTRAMUSCULAR; INTRAVENOUS ONCE
Refills: 0 | Status: COMPLETED | OUTPATIENT
Start: 2024-03-05 | End: 2024-03-05

## 2024-03-05 RX ORDER — ENOXAPARIN SODIUM 100 MG/ML
40 INJECTION SUBCUTANEOUS EVERY 24 HOURS
Refills: 0 | Status: DISCONTINUED | OUTPATIENT
Start: 2024-03-05 | End: 2024-03-08

## 2024-03-05 RX ORDER — CEFTRIAXONE 500 MG/1
INJECTION, POWDER, FOR SOLUTION INTRAMUSCULAR; INTRAVENOUS
Refills: 0 | Status: DISCONTINUED | OUTPATIENT
Start: 2024-03-05 | End: 2024-03-08

## 2024-03-05 RX ORDER — CEFTRIAXONE 500 MG/1
1000 INJECTION, POWDER, FOR SOLUTION INTRAMUSCULAR; INTRAVENOUS EVERY 24 HOURS
Refills: 0 | Status: DISCONTINUED | OUTPATIENT
Start: 2024-03-06 | End: 2024-03-08

## 2024-03-05 RX ORDER — HYDRALAZINE HCL 50 MG
10 TABLET ORAL EVERY 8 HOURS
Refills: 0 | Status: DISCONTINUED | OUTPATIENT
Start: 2024-03-05 | End: 2024-03-08

## 2024-03-05 RX ORDER — HYDRALAZINE HCL 50 MG
25 TABLET ORAL
Refills: 0 | Status: DISCONTINUED | OUTPATIENT
Start: 2024-03-05 | End: 2024-03-07

## 2024-03-05 RX ORDER — SODIUM CHLORIDE 9 MG/ML
1000 INJECTION INTRAMUSCULAR; INTRAVENOUS; SUBCUTANEOUS ONCE
Refills: 0 | Status: COMPLETED | OUTPATIENT
Start: 2024-03-05 | End: 2024-03-05

## 2024-03-05 RX ORDER — SODIUM CHLORIDE 5 %
0 DROPS OPHTHALMIC (EYE)
Refills: 0 | DISCHARGE

## 2024-03-05 RX ORDER — SODIUM CHLORIDE 9 MG/ML
1000 INJECTION, SOLUTION INTRAVENOUS
Refills: 0 | Status: DISCONTINUED | OUTPATIENT
Start: 2024-03-05 | End: 2024-03-06

## 2024-03-05 RX ORDER — OMEPRAZOLE 10 MG/1
1 CAPSULE, DELAYED RELEASE ORAL
Refills: 0 | DISCHARGE

## 2024-03-05 RX ORDER — HYDROXYCHLOROQUINE SULFATE 200 MG
1 TABLET ORAL
Refills: 0 | DISCHARGE

## 2024-03-05 RX ADMIN — SODIUM CHLORIDE 1000 MILLILITER(S): 9 INJECTION INTRAMUSCULAR; INTRAVENOUS; SUBCUTANEOUS at 18:57

## 2024-03-05 RX ADMIN — SODIUM CHLORIDE 75 MILLILITER(S): 9 INJECTION, SOLUTION INTRAVENOUS at 22:51

## 2024-03-05 RX ADMIN — Medication 25 MILLIGRAM(S): at 20:19

## 2024-03-05 RX ADMIN — CEFTRIAXONE 100 MILLIGRAM(S): 500 INJECTION, POWDER, FOR SOLUTION INTRAMUSCULAR; INTRAVENOUS at 18:57

## 2024-03-05 NOTE — H&P ADULT - ASSESSMENT
84-year-old female with past medical history of diabetes, hypertension, rheumatoid arthritis, GERD presenting to emergency department brought in by EMS for generalized weakness.   Patient was seen in the emergency department 4 days prior for abdominal discomfort and diarrhea, was diagnosed with a urinary tract infection and discharged home on oral antibiotics.  Patient states the antibiotics are giving her an upset stomach so only took 1 day of antibiotics before stopping.  Patient accompanied by daughter who states patient has not been eating for the past week.    Patient states food does not taste good, has no desire to eat.  Daughter states she has had increasing weakness, today was unable to get up out of chair unassisted.  Daughter takes care of patient at home without any support.  Daughter feels unsafe taking care of mother at this time.  Denies fever, headache, vision change, chest pain, shortness of breath, abdominal pain, nausea, vomiting, rash.    pan culture  start CTX  ID called  RVP neg  PT eval  DVT ppx w sc Lovenox

## 2024-03-05 NOTE — ED ADULT NURSE NOTE - OBJECTIVE STATEMENT
1729 pt 84yf aox3 bib ems/micaela from [facility] called in by daughter pt is non compliant w/ meds ozempic and uti meds states feeling week for the past couple of days, was seen here last Saturday w/ dx uti, vitals wnl, in no acute distress pending eval/gc

## 2024-03-05 NOTE — ED ADULT NURSE NOTE - NSFALLHARMRISKINTERV_ED_ALL_ED

## 2024-03-05 NOTE — H&P ADULT - NSHPPOAURINARYCATHETER_GEN_ALL_CORE
estradiol (VIVELLE-DOT) 0.0375 MG/24HR BIW patch (Discontinued) 24 patch 3 11/11/2019 11/16/2020 No   Sig - Route: Place 1 patch onto the skin twice a week - Transdermal   Patient taking differently: Place 1 patch onto the skin once a week         Sent to pharmacy as: estradiol (VIVELLE-DOT) 0.0375 MG/24HR BIW patch   Class: E-Prescribe   Order: [id number]   E-Prescribing Status: Receipt confirmed by pharmacy (11/11/2019  4:20 PM CST)   Printout Tracking    External Result Report   Medication Notes         ALIHEYDER, AKIL   Donaldoayush Mar 24, 2020  7:36 PM (CDT)   Weekly              Pharmacy    [id number] - [place], [place] - [address]   This Order Has Been Discontinued    Order Status Reason By On   Discontinued None Caprice Lares MD 11/16/20 0939          Associated Diagnoses    Fibrocystic breast disease, unspecified laterality [N60.19]             Fax from pharmacy requesting refill  See 11- OV - Per Dr Lares - medication has been stopped    Fax sent back to pharmacy    RT Dat (R)    
no

## 2024-03-05 NOTE — ED ADULT NURSE REASSESSMENT NOTE - NS ED NURSE REASSESS COMMENT FT1
Pt ambulated to bathroom with steady gait, was not able to provide RN with clean catch urine sample. Pt urinated clear yellow urine.

## 2024-03-05 NOTE — ED PROVIDER NOTE - CLINICAL SUMMARY MEDICAL DECISION MAKING FREE TEXT BOX
Ricci Bailey MD, PGY2  84-year-old female with past medical history of diabetes, hypertension, rheumatoid arthritis, GERD presenting to emergency department brought in by EMS for generalized weakness.  Patient was seen in the emergency department 4 days prior for abdominal discomfort and diarrhea, was diagnosed with a urinary tract infection and discharged home on oral antibiotics.  Patient states the antibiotics are giving her an upset stomach so only took 1 day of antibiotics before stopping.  Patient accompanied by daughter who states patient has not been eating for the past week.  Patient states food does not taste good, has no desire to eat.  Daughter states she has had increasing weakness, today was unable to get up out of chair unassisted.  Daughter takes care of patient at home without any support.  Daughter feels unsafe taking care of mother at this time.  Denies fever, headache, vision change, chest pain, shortness of breath, abdominal pain, nausea, vomiting, rash.    In the emergency department patient is hemodynamically stable, afebrile.  On exam patient is in no acute distress.  Abdomen soft nontender nondistended.  Nonfocal neurologic examination.  Strength 5 out of 5 bilateral upper and lower extremities.  Differential including but not limited to urinary tract infection, malnourishment/failure to thrive, anemia, electrolyte abnormalities, viral illness.  Plan to obtain labs, urinalysis, chest x-ray, EKG, put on cardiac monitor.  Patient will likely require IV antibiotics for treatment of urinary tract infection, IV hydration.  Admission for failure to thrive and malnourishment. 84-year-old female with past medical history of diabetes, hypertension, rheumatoid arthritis, GERD presenting to emergency department brought in by EMS for generalized weakness.    Patient was seen in the emergency department 4 days prior for abdominal discomfort and diarrhea, was diagnosed with a urinary tract infection and discharged home on oral antibiotics.  Patient states the antibiotics are giving her an upset stomach so only took 1 day of antibiotics before stopping.  Patient accompanied by daughter who states patient has not been eating for the past week.  Patient states food does not taste good, has no desire to eat.  Daughter states she has had increasing weakness, today was unable to get up out of chair unassisted.  Daughter takes care of patient at home without any support.  Daughter feels unsafe taking care of mother at this time.  Denies fever, headache, vision change, chest pain, shortness of breath, abdominal pain, nausea, vomiting, rash.    In the emergency department patient is hemodynamically stable, afebrile.  On exam patient is in no acute distress.  Abdomen soft nontender nondistended.  Nonfocal neurologic examination.  Strength 5 out of 5 bilateral upper and lower extremities.  Differential including but not limited to urinary tract infection, malnourishment/failure to thrive, anemia, electrolyte abnormalities, viral illness.  Plan to obtain labs, urinalysis, chest x-ray, EKG, put on cardiac monitor.  Patient will likely require IV antibiotics for treatment of urinary tract infection, IV hydration.  Admission for failure to thrive and malnourishment ZR

## 2024-03-05 NOTE — H&P ADULT - NSHPPHYSICALEXAM_GEN_ALL_CORE
T(C): 36.6 (03-05-24 @ 17:34), Max: 36.6 (03-05-24 @ 17:34)  HR: 61 (03-05-24 @ 17:34) (61 - 66)  BP: 196/89 (03-05-24 @ 17:34) (176/90 - 196/89)  RR: 18 (03-05-24 @ 17:34) (16 - 18)  SpO2: 99% (03-05-24 @ 17:34) (99% - 99%)    PHYSICAL EXAM:  GENERAL: NAD, well-developed  HEAD:  Atraumatic, Normocephalic  EYES: EOMI, PERRLA, conjunctiva and sclera clear  NECK: Supple, No JVD  CHEST/LUNG: Clear to auscultation bilaterally; No wheeze  HEART: Regular rate and rhythm; No murmurs, rubs, or gallops  ABDOMEN: Soft, Nontender, Nondistended; Bowel sounds present  EXTREMITIES:  2+ Peripheral Pulses, No clubbing, cyanosis, or edema  PSYCH: AAOx3  NEUROLOGY: non-focal  SKIN: No rashes or lesions

## 2024-03-05 NOTE — H&P ADULT - HISTORY OF PRESENT ILLNESS
84-year-old female with past medical history of diabetes, hypertension, rheumatoid arthritis, GERD presenting to emergency department brought in by EMS for generalized weakness.   Patient was seen in the emergency department 4 days prior for abdominal discomfort and diarrhea, was diagnosed with a urinary tract infection and discharged home on oral antibiotics.  Patient states the antibiotics are giving her an upset stomach so only took 1 day of antibiotics before stopping.  Patient accompanied by daughter who states patient has not been eating for the past week.    Patient states food does not taste good, has no desire to eat.  Daughter states she has had increasing weakness, today was unable to get up out of chair unassisted.  Daughter takes care of patient at home without any support.  Daughter feels unsafe taking care of mother at this time.  Denies fever, headache, vision change, chest pain, shortness of breath, abdominal pain, nausea, vomiting, rash.

## 2024-03-05 NOTE — ED PROVIDER NOTE - PHYSICAL EXAMINATION
Gen: WDWN, NAD  HEENT: EOMI, no nasal discharge, mucous membranes dry  CV: RRR, +S1/S2, no M/R/G, 2+ radial pulses b/l  Resp: CTAB, no W/R/R, no accessory muscle use, no increased work of breathing  GI: Abdomen soft non-distended, NTTP  MSK: No open wounds, no bruising, no LE edema  Neuro: CN II-XII intact. finger to nose intact. no pronator drift. Strength 5/5 b/l upper and lower extremities. A&Ox4, following commands, moving all four extremities spontaneously  Psych: appropriate mood

## 2024-03-06 ENCOUNTER — RESULT REVIEW (OUTPATIENT)
Age: 84
End: 2024-03-06

## 2024-03-06 LAB
APPEARANCE UR: CLEAR — SIGNIFICANT CHANGE UP
BACTERIA # UR AUTO: NEGATIVE /HPF — SIGNIFICANT CHANGE UP
BILIRUB UR-MCNC: NEGATIVE — SIGNIFICANT CHANGE UP
CAST: 1 /LPF — SIGNIFICANT CHANGE UP (ref 0–4)
COLOR SPEC: YELLOW — SIGNIFICANT CHANGE UP
DIFF PNL FLD: NEGATIVE — SIGNIFICANT CHANGE UP
GLUCOSE UR QL: 250 MG/DL
KETONES UR-MCNC: ABNORMAL MG/DL
LEUKOCYTE ESTERASE UR-ACNC: ABNORMAL
NITRITE UR-MCNC: NEGATIVE — SIGNIFICANT CHANGE UP
PH UR: 6 — SIGNIFICANT CHANGE UP (ref 5–8)
PROT UR-MCNC: 30 MG/DL
RBC CASTS # UR COMP ASSIST: 1 /HPF — SIGNIFICANT CHANGE UP (ref 0–4)
SP GR SPEC: 1.02 — SIGNIFICANT CHANGE UP (ref 1–1.03)
SQUAMOUS # UR AUTO: 7 /HPF — HIGH (ref 0–5)
UROBILINOGEN FLD QL: 0.2 MG/DL — SIGNIFICANT CHANGE UP (ref 0.2–1)
WBC UR QL: 50 /HPF — HIGH (ref 0–5)

## 2024-03-06 PROCEDURE — 93306 TTE W/DOPPLER COMPLETE: CPT | Mod: 26

## 2024-03-06 PROCEDURE — 93880 EXTRACRANIAL BILAT STUDY: CPT | Mod: 26

## 2024-03-06 RX ORDER — ONDANSETRON 8 MG/1
4 TABLET, FILM COATED ORAL ONCE
Refills: 0 | Status: COMPLETED | OUTPATIENT
Start: 2024-03-06 | End: 2024-03-06

## 2024-03-06 RX ORDER — FAMOTIDINE 10 MG/ML
20 INJECTION INTRAVENOUS DAILY
Refills: 0 | Status: DISCONTINUED | OUTPATIENT
Start: 2024-03-06 | End: 2024-03-08

## 2024-03-06 RX ORDER — LOPERAMIDE HCL 2 MG
2 TABLET ORAL
Refills: 0 | Status: DISCONTINUED | OUTPATIENT
Start: 2024-03-06 | End: 2024-03-08

## 2024-03-06 RX ORDER — POTASSIUM CHLORIDE 20 MEQ
10 PACKET (EA) ORAL ONCE
Refills: 0 | Status: COMPLETED | OUTPATIENT
Start: 2024-03-06 | End: 2024-03-06

## 2024-03-06 RX ORDER — ONDANSETRON 8 MG/1
4 TABLET, FILM COATED ORAL ONCE
Refills: 0 | Status: DISCONTINUED | OUTPATIENT
Start: 2024-03-06 | End: 2024-03-08

## 2024-03-06 RX ORDER — POTASSIUM CHLORIDE 20 MEQ
20 PACKET (EA) ORAL ONCE
Refills: 0 | Status: DISCONTINUED | OUTPATIENT
Start: 2024-03-06 | End: 2024-03-06

## 2024-03-06 RX ORDER — MECLIZINE HCL 12.5 MG
12.5 TABLET ORAL THREE TIMES A DAY
Refills: 0 | Status: DISCONTINUED | OUTPATIENT
Start: 2024-03-06 | End: 2024-03-08

## 2024-03-06 RX ORDER — METOPROLOL TARTRATE 50 MG
12.5 TABLET ORAL
Refills: 0 | Status: DISCONTINUED | OUTPATIENT
Start: 2024-03-06 | End: 2024-03-07

## 2024-03-06 RX ORDER — SODIUM CHLORIDE 9 MG/ML
1000 INJECTION, SOLUTION INTRAVENOUS
Refills: 0 | Status: DISCONTINUED | OUTPATIENT
Start: 2024-03-06 | End: 2024-03-08

## 2024-03-06 RX ORDER — THIAMINE MONONITRATE (VIT B1) 100 MG
500 TABLET ORAL DAILY
Refills: 0 | Status: DISCONTINUED | OUTPATIENT
Start: 2024-03-06 | End: 2024-03-08

## 2024-03-06 RX ORDER — PANTOPRAZOLE SODIUM 20 MG/1
40 TABLET, DELAYED RELEASE ORAL DAILY
Refills: 0 | Status: DISCONTINUED | OUTPATIENT
Start: 2024-03-06 | End: 2024-03-08

## 2024-03-06 RX ORDER — ONDANSETRON 8 MG/1
4 TABLET, FILM COATED ORAL EVERY 8 HOURS
Refills: 0 | Status: DISCONTINUED | OUTPATIENT
Start: 2024-03-06 | End: 2024-03-08

## 2024-03-06 RX ADMIN — Medication 25 MILLIGRAM(S): at 05:39

## 2024-03-06 RX ADMIN — ENOXAPARIN SODIUM 40 MILLIGRAM(S): 100 INJECTION SUBCUTANEOUS at 05:37

## 2024-03-06 RX ADMIN — Medication 25 MILLIGRAM(S): at 18:33

## 2024-03-06 RX ADMIN — FAMOTIDINE 20 MILLIGRAM(S): 10 INJECTION INTRAVENOUS at 18:32

## 2024-03-06 RX ADMIN — Medication 10 MILLIGRAM(S): at 01:54

## 2024-03-06 RX ADMIN — Medication 1 TABLET(S): at 18:30

## 2024-03-06 RX ADMIN — ONDANSETRON 4 MILLIGRAM(S): 8 TABLET, FILM COATED ORAL at 13:31

## 2024-03-06 RX ADMIN — Medication 105 MILLIGRAM(S): at 21:11

## 2024-03-06 RX ADMIN — Medication 100 MILLIEQUIVALENT(S): at 13:32

## 2024-03-06 RX ADMIN — PANTOPRAZOLE SODIUM 40 MILLIGRAM(S): 20 TABLET, DELAYED RELEASE ORAL at 18:30

## 2024-03-06 RX ADMIN — CEFTRIAXONE 100 MILLIGRAM(S): 500 INJECTION, POWDER, FOR SOLUTION INTRAMUSCULAR; INTRAVENOUS at 21:13

## 2024-03-06 RX ADMIN — Medication 2 MILLIGRAM(S): at 18:30

## 2024-03-06 RX ADMIN — Medication 12.5 MILLIGRAM(S): at 13:31

## 2024-03-06 NOTE — CONSULT NOTE ADULT - ASSESSMENT
The patient is a 84-year-old female with past medical history of diabetes, hypertension, rheumatoid arthritis, GERD presenting to emergency department brought in by EMS for generalized weakness in the setting of untreated UTI. GI consulted for hx of diarrhea and anorexia.     1. UTI     2. anorexia    3. DM       I had a prolonged conversation with the patient regarding the hospital course, differential diagnosis, results of diagnostic tests this far, and therapeutic modalities available. Plan of care discussed with the patient after the evaluation. Patient expresses a clear understanding of the plan of care. Fifty minutes spent on the total encounter, of which more than fifty percent of the encounter was spent on counseling and/or coordinating care by the attending physician. Advanced care planning forms were discussed. Code status including forceful chest compressions, defibrillation and intubation were discussed. The risks benefits and alternatives to pertinent gastrointestinal procedures and interventions were discussed in detail and all questions were answered. Duration: 15 Minutes.      Bogdan Hurd D.O.  Gastroenterology and Hepatology  [address] [place] [address], [address]  [place], [place]  Office: [phone]   The patient is a 84-year-old female with past medical history of diabetes, hypertension, rheumatoid arthritis, GERD presenting to emergency department brought in by EMS for generalized weakness in the setting of untreated UTI. GI consulted for hx of diarrhea and anorexia.     1. Dysgeusia  resulting in anorexia   ? related to semaglutide, dry mouth, DM, neurologic disorder   doubt organic GI pathology   B12, zinc ordered   nutrition consult   daily PPI     2. Diarrhea   temporally related to Ozempic, improved once it was d/c'd  had outpt stool testing, f/u    3. UTI  per ID      4. DM       I had a prolonged conversation with the patient regarding the hospital course, differential diagnosis, results of diagnostic tests this far, and therapeutic modalities available. Plan of care discussed with the patient after the evaluation. Patient expresses a clear understanding of the plan of care. Fifty minutes spent on the total encounter, of which more than fifty percent of the encounter was spent on counseling and/or coordinating care by the attending physician. Advanced care planning forms were discussed. Code status including forceful chest compressions, defibrillation and intubation were discussed. The risks benefits and alternatives to pertinent gastrointestinal procedures and interventions were discussed in detail and all questions were answered. Duration: 15 Minutes.      Bogdan Hurd D.O.  Gastroenterology and Hepatology  [address] [facility], [address]  [place], [place]  Office: [phone]

## 2024-03-06 NOTE — CONSULT NOTE ADULT - SUBJECTIVE AND OBJECTIVE BOX
03-06-24 @ 16:04    Patient is a 84y old  Female who presents with a chief complaint of weakness (06 Mar 2024 15:10)      HPI:  84-year-old female with past medical history of diabetes, hypertension, rheumatoid arthritis, GERD presenting to emergency department brought in by EMS for generalized weakness. Patient was seen in the emergency department 4 days prior for abdominal discomfort and diarrhea, was diagnosed with a urinary tract infection and discharged home on oral antibiotics.  Patient states the antibiotics are giving her an upset stomach so only took 1 day of antibiotics before stopping.  Patient accompanied by daughter who states patient has not been eating for the past week.  Patient states food does not taste good, has no desire to eat.  Daughter states she has had increasing weakness, today was unable to get up out of chair unassisted.  Daughter takes care of patient at home without any support.  Daughter feels unsafe taking care of mother at this time.  Denies fever, headache, vision change, chest pain, shortness of breath, abdominal pain, nausea, vomiting, rash.   (05 Mar 2024 19:47)    to me pt says she came in here with abd pain and UTI:  shexhas no underlying lung disease:  she had no fever at home  , no cough or phlegm or chest pain : now found to have pulm nodules and hence pulm called:  she has no hx of cancer too:         ?FOLLOWING PRESENT  [x ] Hx of PE/DVT, [ x] Hx COPD, [x ] Hx of Asthma, [ y] Hx of Hospitalization, [ x]  Hx of BiPAP/CPAP use, [ x] Hx of JAYA    Allergies    Remicade (Anaphylaxis)    Intolerances        PAST MEDICAL & SURGICAL HISTORY:  Sjoegren syndrome      Rheumatoid arthritis      Osteopenia      Biliary cirrhosis      Nephrolithiasis      Type 2 diabetes mellitus      Hypothyroid      Status post laminectomy      Cataract  left eye cataract surgery 3/3/2014          FAMILY HISTORY:      Social History: [x  ] TOBACCO                  [ x ] ETOH                                 [ x ] IVDA/DRUGS    REVIEW OF SYSTEMS      General:	weakness,  unable to move much      Skin/Breast:x  	  Ophthalmologic:x  	  ENMT:	x    Respiratory and Thorax:  no sob, no cough , no phlegm    	  Cardiovascular:	x    Gastrointestinal:	x    Genitourinary:	x    Musculoskeletal:	x    Neurological:	x    Psychiatric:	x    Hematology/Lymphatics:	x    Endocrine:	x    Allergic/Immunologic:	x    MEDICATIONS  (STANDING):  cefTRIAXone   IVPB      cefTRIAXone   IVPB 1000 milliGRAM(s) IV Intermittent every 24 hours  enoxaparin Injectable 40 milliGRAM(s) SubCutaneous every 24 hours  hydrALAZINE 25 milliGRAM(s) Oral two times a day  lactated ringers. 1000 milliLiter(s) (75 mL/Hr) IV Continuous <Continuous>  metoprolol tartrate 12.5 milliGRAM(s) Oral two times a day  pantoprazole  Injectable 40 milliGRAM(s) IV Push daily    MEDICATIONS  (PRN):  hydrALAZINE Injectable 10 milliGRAM(s) IV Push every 8 hours PRN SBP>169       Vital Signs Last 24 Hrs  T(C): 36.7 (06 Mar 2024 12:40), Max: 36.9 (06 Mar 2024 01:50)  T(F): 98.1 (06 Mar 2024 12:40), Max: 98.5 (06 Mar 2024 01:50)  HR: 90 (06 Mar 2024 12:40) (61 - 104)  BP: 147/83 (06 Mar 2024 12:40) (136/83 - 206/89)  BP(mean): 122 (05 Mar 2024 17:34) (122 - 122)  RR: 18 (06 Mar 2024 12:40) (16 - 20)  SpO2: 97% (06 Mar 2024 12:40) (96% - 99%)    Parameters below as of 06 Mar 2024 12:40  Patient On (Oxygen Delivery Method): room air    Orthostatic VS          I&O's Summary      Physical Exam:   GENERAL: NAD, well-groomed, well-developed  HEENT: EMANUEL/   Atraumatic, Normocephalic  ENMT: No tonsillar erythema, exudates, or enlargement; Moist mucous membranes, Good dentition, No lesions  NECK: Supple, No JVD, Normal thyroid  CHEST/LUNG: Clear to auscultation bilaterally- no wheezing  CVS: Regular rate and rhythm; No murmurs, rubs, or gallops  GI: : Soft, Nontender, Nondistended; Bowel sounds present  NERVOUS SYSTEM:  Alert & Oriented X3  EXTREMITIES: - edema  LYMPH: No lymphadenopathy noted  SKIN: No rashes or lesions  ENDOCRINOLOGY: No Thyromegaly  PSYCH: Appropriate    Labs:  -0.1<44<4>>24<<7.375>>-0.1<<3><<4><<5<<249>>                            11.3   7.40  )-----------( 299      ( 05 Mar 2024 18:43 )             33.1     03-05    144  |  108  |  24<H>  ----------------------------<  94  3.4<L>   |  26  |  1.26    Ca    10.6<H>      05 Mar 2024 18:43  Phos  3.4     03-05  Mg     2.0     03-05    TPro  6.5  /  Alb  3.8  /  TBili  0.3  /  DBili  x   /  AST  15  /  ALT  11  /  AlkPhos  85  03-05    CAPILLARY BLOOD GLUCOSE        LIVER FUNCTIONS - ( 05 Mar 2024 18:43 )  Alb: 3.8 g/dL / Pro: 6.5 g/dL / ALK PHOS: 85 U/L / ALT: 11 U/L / AST: 15 U/L / GGT: x           PT/INR - ( 05 Mar 2024 18:43 )   PT: 12.5 sec;   INR: 1.14 ratio         PTT - ( 05 Mar 2024 18:43 )  PTT:30.2 sec  Urinalysis Basic - ( 06 Mar 2024 00:29 )    Color: Yellow / Appearance: Clear / S.016 / pH: x  Gluc: x / Ketone: Trace mg/dL  / Bili: Negative / Urobili: 0.2 mg/dL   Blood: x / Protein: 30 mg/dL / Nitrite: Negative   Leuk Esterase: Moderate / RBC: 1 /HPF / WBC 50 /HPF   Sq Epi: x / Non Sq Epi: 7 /HPF / Bacteria: Negative /HPF      D DImer      Studies  Chest X-RAY  CT SCAN Chest   CT Abdomen  Venous Dopplers: LE:   Others    rad< from: VA Duplex Carotid, Bilat (24 @ 10:51) >  LEFT:  PROX CCA = 104  DIST CCA = 50  PROX ICA = 78  DIST ICA = 44  ECA = 109    Antegrade flow is noted within both vertebral arteries.    IMPRESSION: No significant hemodynamic stenosis of either carotid artery.    Measurement of carotid stenosis is based on velocity parameters that   correlate the residual internal carotid diameter with that of the more   distal vessel in accordance with a method such as the [place] American   Symptomatic Carotid Endarterectomy Trial (NASCET).    --- End of Report ---    < end of copied text >  < from: CT Chest No Cont (24 @ 22:35) >  grossly normal.    IMPRESSION:  Few sub-4 mm micronodules in bilateral lungs. According to Fleischner   Society guideline for management of incidental pulmonary nodules,   optional follow-up CT chest in 12 months is recommended in a high risk   patient.    --- End of Report ---    < end of copied text >  < from: CT Abdomen and Pelvis w/ IV Cont (24 @ 18:34) >  ABDOMINAL WALL: Within normallimits.  BONES: L3-L5 spinal fusion.    IMPRESSION:  1.  New LEFT lower lobe 1 cm pulmonary nodule. Single nodule larger than   8mm should be followed by chest CT, PET/CT or tissue sampling at 3 months.  2.  Nonobstructing 1 cm RIGHT renal calculus.    --- End of Report ---           JESÚS BURNETT MD; Resident Radiologist  This document has been electronically signed.  KATHLEEN MOREL MD; Attending Radiologist  This document has been electronically signed. Mar  1 2024  7:07PM    < end of copied text >  < from: CT Abdomen and Pelvis w/ Oral Cont and w/ IV Cont (19 @ 13:26) >  FINDINGS:    LOWER CHEST: Within normal limits.    LIVER: Within normal limits.  BILE DUCTS: Normal caliber.   GALLBLADDER: Within normal limits.  SPLEEN: Within normal limits.  PANCREAS: Within normal limits.  ADRENALS: Within normal limits.  KIDNEYS/URETERS: No hydronephrosis. Right renal cyst. Subcentimeter   hypodense lesions in both kidneys, too small to characterize.   Nonobstructing calculus in the lower pole of the right kidney, measuring   0.8 cm.     BLADDER: Within normal limits.  REPRODUCTIVE ORGANS: The uterus and adnexa are within normal limits.    BOWEL: Mural thickening of the colon at the splenic flexure with mucosal   hyperenhancement. No bowel obstruction. Normal appendix.    PERITONEUM: No ascites. 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  VESSELS:  Atherosclerotic change of the abdominal aorta and its branches.  RETROPERITONEUM: No lymphadenopathy.    ABDOMINAL WALL: Within normal limits.  BONES: Degenerative changes of the spine. Lumbar spinal fixation hardware   from L3 to L5.    IMPRESSION:   Colitis at the splenic flexure.     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         < end of copied text >  < from: CT Chest No Cont (18 @ 16:11) >    INTERPRETATION:  CLINICAL INFORMATION: Rheumatoid arthritis. Smoking   history.    COMPARISON: CT chest 3/1/2015    PROCEDURE: CT of the Chest was performed without intravenous contrast.   Sagittal, coronal, and maximum intensity projection reformatted images   were performed.    FINDINGS:    LUNGS AND LARGE AIRWAYS: The central tracheobronchial tree is patent. No   focal pulmonary consolidations.  Stable 2 mm nodules in both lung apices   (series 3 image 46).  Several stable left upper lobe nodules measuring up   to 2 mm in size (for example, on images 77 and 99, among others of series   3). Several stable right upper lobe nodules measuring up to 2 mm in size   (for example, on images 72, 73, 96 of series 3, among others). Stable 3   mm right lower lobe nodule on image 131 of series 3.  PLEURA: No pleural effusion.  VESSELS: Atherosclerotic changes of the aorta and coronary arteries.  HEART: Heart size is normal. No pericardial effusion. Aortic valvular   calcification.  MEDIASTINUM AND LEV: No enlarged axillary or mediastinal lymph nodes  VISUALIZED UPPER ABDOMEN: Bilateral nephrolithiasis, partially imaged.   Stable right renal hypodensities. Stable mild nonspecific left adrenal   gland thickening, likely adenomatous in nature.  BONES: Degenerative changes of the visualized spine.    IMPRESSION:    Small bilateral pulmonary nodules as described, all stable since .               MAURICIO SANTOS M.D., RADIOLOGY RESIDENT  This document has been electronically signed.  AALIYAH ABAD M.D., ATTENDING RADIOLOGIST  This document has been electronically signed. May 21 2018  1:06PM        < end of copied text >  < from: CT Angio Chest w/Cont (07 @ 12:31) >    EXAM:  CT ANGIO CHEST W CONTRAST    PROCEDURE DATE:  08/10/2007    .    INTERPRETATION:  Clinical statement: Chest pain.    Technique: A CT pulmonary angiogram is performed according to   departmental protocol following the uncomplicated administration of 120   cc's Isovue-370. Subsequently, 10 mm contiguous axial images were   obtained from the superior iliac crests to the popliteal fossa at a 150   second time delay.    No prior examinations are available for comparison.    Findings:    Thereis no pulmonary embolus. There is no thoracic aortic dissection.    There is no axillary, mediastinal, or hilar lymphadenopathy. No pleural   or pericardial effusions are identified and the heart size is normal.    The lungs are clear.    There is nolower extremity deep venous thrombosis.    Lumbar spine hardware is noted.        IMPRESSION:   No pulmonary embolus. No thoracic aortic dissection. No   lower extremity deep venous thrombosis.             CHRISTINE MCDOWELL M.D., ATTENDING RADIOLOGIST  This examination was interpreted on:  Aug 11 2007 12:04P.  This document   has been electronically signed. Aug 11 2007 12:26P.    < end of copied text >

## 2024-03-06 NOTE — CONSULT NOTE ADULT - SUBJECTIVE AND OBJECTIVE BOX
Chief Complaint:  Patient is a 84y old  Female who presents with a chief complaint of     Date of service: 24 @ 14:12    HPI:    The patient is a 84-year-old female with past medical history of diabetes, hypertension, rheumatoid arthritis, GERD presenting to emergency department brought in by EMS for generalized weakness. Patient was seen in the emergency department 4 days prior for abdominal discomfort and diarrhea, was diagnosed with a urinary tract infection and discharged home on oral antibiotics. The antibiotics are giving her an upset stomach so only took 1 day of antibiotics before stopping.  Patient accompanied by daughter who states patient has not been eating for the past week. Patient states food does not taste good, has no desire to eat.      Allergies:  Remicade (Anaphylaxis)      Home Medications:    Hospital Medications:  cefTRIAXone   IVPB      cefTRIAXone   IVPB 1000 milliGRAM(s) IV Intermittent every 24 hours  enoxaparin Injectable 40 milliGRAM(s) SubCutaneous every 24 hours  hydrALAZINE 25 milliGRAM(s) Oral two times a day  hydrALAZINE Injectable 10 milliGRAM(s) IV Push every 8 hours PRN  lactated ringers. 1000 milliLiter(s) IV Continuous <Continuous>  metoprolol tartrate 12.5 milliGRAM(s) Oral two times a day      PMHX/PSHX:  Sjoegren syndrome    Rheumatoid arthritis    Osteopenia    Biliary cirrhosis    Nephrolithiasis    Type 2 diabetes mellitus    Hypothyroid    Status post laminectomy    Cataract        Family history:  No pertinent family history in first degree relatives        Social History:   Denies ethanol use.  Denies illicit drug use.    ROS:     General:  No wt loss, fevers, chills, night sweats, fatigue,   Eyes:  Good vision, no reported pain  ENT:  No sore throat, pain, runny nose, dysphagia  CV:  No pain, palpitations, hypo/hypertension  Resp:  No dyspnea, cough, tachypnea, wheezing  GI:  See HPI  :  No pain, bleeding, incontinence, nocturia  Muscle:  No pain, weakness  Neuro:  No weakness, tingling, memory problems  Psych:  No fatigue, insomnia, mood problems, depression  Endocrine:  No polyuria, polydipsia, cold/heat intolerance  Heme:  No petechiae, ecchymosis, easy bruisability  Integumentary:  No rash, edema      PHYSICAL EXAM:     GENERAL:  Appears stated age, well-groomed, well-nourished, no distress  HEENT:  NC/AT,  conjunctivae anicteric, clear and pink,   NECK: supple, trachea midline  CHEST:  Full & symmetric excursion, no increased effort, breath sounds clear  HEART:  Regular rhythm, no JVD  ABDOMEN:  Soft, non-tender, non-distended, normoactive bowel sounds,  no masses , no hepatosplenomegaly  EXTREMITIES:  no cyanosis,clubbing or edema  SKIN:  No rash, erythema, or, ecchymoses, no jaundice  NEURO:  Alert, non-focal, no asterixis  PSYCH: Appropriate affect, oriented to place and time  RECTAL: Deferred      Vital Signs:  Vital Signs Last 24 Hrs  T(C): 36.7 (06 Mar 2024 12:40), Max: 36.9 (06 Mar 2024 01:50)  T(F): 98.1 (06 Mar 2024 12:40), Max: 98.5 (06 Mar 2024 01:50)  HR: 90 (06 Mar 2024 12:40) (61 - 104)  BP: 147/83 (06 Mar 2024 12:40) (136/83 - 206/89)  BP(mean): 122 (05 Mar 2024 17:34) (122 - 122)  RR: 18 (06 Mar 2024 12:40) (16 - 20)  SpO2: 97% (06 Mar 2024 12:40) (96% - 99%)    Parameters below as of 06 Mar 2024 12:40  Patient On (Oxygen Delivery Method): room air      Daily Height in cm: 162.56 (05 Mar 2024 17:15)    Daily     LABS: Labs personally reviewed by me:                        11.3   7.40  )-----------( 299      ( 05 Mar 2024 18:43 )             33.1     03-05    144  |  108  |  24<H>  ----------------------------<  94  3.4<L>   |  26  |  1.26    Ca    10.6<H>      05 Mar 2024 18:43  Phos  3.4     03-05  Mg     2.0     03-05    TPro  6.5  /  Alb  3.8  /  TBili  0.3  /  DBili  x   /  AST  15  /  ALT  11  /  AlkPhos  85  03-05    LIVER FUNCTIONS - ( 05 Mar 2024 18:43 )  Alb: 3.8 g/dL / Pro: 6.5 g/dL / ALK PHOS: 85 U/L / ALT: 11 U/L / AST: 15 U/L / GGT: x           PT/INR - ( 05 Mar 2024 18:43 )   PT: 12.5 sec;   INR: 1.14 ratio         PTT - ( 05 Mar 2024 18:43 )  PTT:30.2 sec  Urinalysis Basic - ( 06 Mar 2024 00:29 )    Color: Yellow / Appearance: Clear / S.016 / pH: x  Gluc: x / Ketone: Trace mg/dL  / Bili: Negative / Urobili: 0.2 mg/dL   Blood: x / Protein: 30 mg/dL / Nitrite: Negative   Leuk Esterase: Moderate / RBC: 1 /HPF / WBC 50 /HPF   Sq Epi: x / Non Sq Epi: 7 /HPF / Bacteria: Negative /HPF          Imaging personally reviewed by me:           Chief Complaint:  Patient is a 84y old  Female who presents with a chief complaint of     Date of service: 24 @ 14:12    HPI:    The patient is a 84-year-old hearing impaired female with past medical history of diabetes, hypertension, rheumatoid arthritis and GERD, brought in by her daughter for weakness. GI consulted for diarrhea. Daughter at bedside provides collateral information. She endorses months of explosive diarrhea that began shortly after she was started on Ozempic and metformin. Ozempic was recently discontinued and her diarrhea has greatly improved. She followed with her GI Dr. Serrano, stool testing was done, she does not have the results. One month ago she noted a change in her taste buds, everything tastes too sweet or too salty to her and therefore PO intake has been minimal. She was seen by ENT who told her her taste may never return to normal. She was seen in the ED a week ago for weakness and anorexia - workup notable for UTI and she was Rx PO abx but only took one dose as it made her nauseous. Also c/o dry eyes. Denies dysphagia, epigastric pain, n/v, dysuria, hematuria, black stool or BRBPR. She lost 30 lbs on Ozempic.        Allergies:  Remicade (Anaphylaxis)      Home Medications:    Hospital Medications:  cefTRIAXone   IVPB      cefTRIAXone   IVPB 1000 milliGRAM(s) IV Intermittent every 24 hours  enoxaparin Injectable 40 milliGRAM(s) SubCutaneous every 24 hours  hydrALAZINE 25 milliGRAM(s) Oral two times a day  hydrALAZINE Injectable 10 milliGRAM(s) IV Push every 8 hours PRN  lactated ringers. 1000 milliLiter(s) IV Continuous <Continuous>  metoprolol tartrate 12.5 milliGRAM(s) Oral two times a day      PMHX/PSHX:  Sjoegren syndrome    Rheumatoid arthritis    Osteopenia    Biliary cirrhosis    Nephrolithiasis    Type 2 diabetes mellitus    Hypothyroid    Status post laminectomy    Cataract        Family history:  No pertinent family history in first degree relatives        Social History:   Denies ethanol use.  Denies illicit drug use.    ROS:     General:  No wt loss, fevers, chills, night sweats, fatigue,   Eyes:  Good vision, no reported pain  ENT:  No sore throat, pain, runny nose, dysphagia  CV:  No pain, palpitations, hypo/hypertension  Resp:  No dyspnea, cough, tachypnea, wheezing  GI:  See HPI  :  No pain, bleeding, incontinence, nocturia  Muscle:  No pain, weakness  Neuro:  No weakness, tingling, memory problems  Psych:  No fatigue, insomnia, mood problems, depression  Endocrine:  No polyuria, polydipsia, cold/heat intolerance  Heme:  No petechiae, ecchymosis, easy bruisability  Integumentary:  No rash, edema      PHYSICAL EXAM:     GENERAL:  Appears stated age, well-groomed, well-nourished, no distress  HEENT:  NC/AT,  conjunctivae anicteric, clear and pink,   NECK: supple, trachea midline  CHEST:  Full & symmetric excursion, no increased effort, breath sounds clear  HEART:  Regular rhythm, no JVD  ABDOMEN:  Soft, non-tender, non-distended, normoactive bowel sounds,  no masses , no hepatosplenomegaly  EXTREMITIES:  no cyanosis,clubbing or edema  SKIN:  No rash, erythema, or, ecchymoses, no jaundice  NEURO:  Alert, non-focal, no asterixis  PSYCH: Appropriate affect, oriented to place and time  RECTAL: Deferred      Vital Signs:  Vital Signs Last 24 Hrs  T(C): 36.7 (06 Mar 2024 12:40), Max: 36.9 (06 Mar 2024 01:50)  T(F): 98.1 (06 Mar 2024 12:40), Max: 98.5 (06 Mar 2024 01:50)  HR: 90 (06 Mar 2024 12:40) (61 - 104)  BP: 147/83 (06 Mar 2024 12:40) (136/83 - 206/89)  BP(mean): 122 (05 Mar 2024 17:34) (122 - 122)  RR: 18 (06 Mar 2024 12:40) (16 - 20)  SpO2: 97% (06 Mar 2024 12:40) (96% - 99%)    Parameters below as of 06 Mar 2024 12:40  Patient On (Oxygen Delivery Method): room air      Daily Height in cm: 162.56 (05 Mar 2024 17:15)    Daily     LABS: Labs personally reviewed by me:                        11.3   7.40  )-----------( 299      ( 05 Mar 2024 18:43 )             33.1     03-05    144  |  108  |  24<H>  ----------------------------<  94  3.4<L>   |  26  |  1.26    Ca    10.6<H>      05 Mar 2024 18:43  Phos  3.4     03-05  Mg     2.0     03-05    TPro  6.5  /  Alb  3.8  /  TBili  0.3  /  DBili  x   /  AST  15  /  ALT  11  /  AlkPhos  85  03-05    LIVER FUNCTIONS - ( 05 Mar 2024 18:43 )  Alb: 3.8 g/dL / Pro: 6.5 g/dL / ALK PHOS: 85 U/L / ALT: 11 U/L / AST: 15 U/L / GGT: x           PT/INR - ( 05 Mar 2024 18:43 )   PT: 12.5 sec;   INR: 1.14 ratio         PTT - ( 05 Mar 2024 18:43 )  PTT:30.2 sec  Urinalysis Basic - ( 06 Mar 2024 00:29 )    Color: Yellow / Appearance: Clear / S.016 / pH: x  Gluc: x / Ketone: Trace mg/dL  / Bili: Negative / Urobili: 0.2 mg/dL   Blood: x / Protein: 30 mg/dL / Nitrite: Negative   Leuk Esterase: Moderate / RBC: 1 /HPF / WBC 50 /HPF   Sq Epi: x / Non Sq Epi: 7 /HPF / Bacteria: Negative /HPF          Imaging personally reviewed by me:

## 2024-03-06 NOTE — CONSULT NOTE ADULT - ASSESSMENT
84-year-old female with past medical history of diabetes, hypertension, rheumatoid arthritis, GERD presenting to emergency department brought in by EMS for generalized weakness. Patient was seen in the emergency department 4 days prior for abdominal discomfort and diarrhea, was diagnosed with a urinary tract infection and discharged home on oral antibiotics.  Patient states the antibiotics are giving her an upset stomach so only took 1 day of antibiotics before stopping.  Patient accompanied by daughter who states patient has not been eating for the past week.  Patient states food does not taste good, has no desire to eat.  Daughter states she has had increasing weakness, today was unable to get up out of chair unassisted.  Daughter takes care of patient at home without any support.  Daughter feels unsafe taking care of mother at this time.  Denies fever, headache, vision change, chest pain, shortness of breath, abdominal pain, nausea, vomiting, rash.   (05 Mar 2024 19:47)  to me pt says she came in here with abd pain and UTI:  she has no underlying lung disease:  she had no fever at home  , no cough or phlegm or chest pain : now found to have pulm nodules and hence pulm called:  she has no hx of cancer too:      Multiple pulm nodules:   WEAKNESS/UTI  DN  HTN  RA/ SJOGREN'S SYNDROME    Multiple pulm nodules:   -on current ct chest : Few sub-4 mm micronodules in bilateral lungs. According to Fleischner Society guideline for management of incidental pulmonary nodules, optional follow-up CT chest in 12 months is recommended in a high risk patient.  -ct chest from 2018  : showed Small bilateral pulmonary nodules as described 2-3 mm  , all stable since 2015.  -surprisingly though ct abd showed on current admission ; 1.  New LEFT lower lobe 1 cm pulmonary nodule. Single nodule larger than 8mm should be followed by chest CT, PET/CT or tissue sampling at 3 months. this was not seen on subsequent ct scan chest   -needs outpt follow up with pulm   WEAKNESS/UTI  -started on ceftriaone:  do panculture   DM  -monitor and control   HTN  -controlled  RA/ SJOGREN'S SYNDROME  -per PMD:     dvt prophylaxis    dw team

## 2024-03-06 NOTE — CONSULT NOTE ADULT - SUBJECTIVE AND OBJECTIVE BOX
[facility], Division of Infectious Diseases  ROLANDO Hanson S. Shah, Y. Patel, G. [facility]  [phone]  ([phone] - weekdays after 5pm and weekends)    DEBBIE NUÑEZ  84y, Female  [id number]    HPI--  HPI:  84-year-old female with past medical history of diabetes, hypertension, rheumatoid arthritis, GERD presenting to emergency department brought in by EMS for generalized weakness.   Patient was seen in the emergency department 4 days prior for abdominal discomfort and diarrhea, was diagnosed with a urinary tract infection and discharged home on oral antibiotics.  Patient states the antibiotics are giving her an upset stomach so only took 1 day of antibiotics before stopping.  Patient accompanied by daughter who states patient has not been eating for the past week.    Patient states food does not taste good, has no desire to eat.  Daughter states she has had increasing weakness, today was unable to get up out of chair unassisted.  Daughter takes care of patient at home without any support.  Daughter feels unsafe taking care of mother at this time.  Denies fever, headache, vision change, chest pain, shortness of breath, abdominal pain, nausea, vomiting, rash.   (05 Mar 2024 19:47)    ROS: 10 point review of systems completed, pertinent positives and negatives as per HPI.    Allergies: Remicade (Anaphylaxis)    PMH -- Sjoegren syndrome    Rheumatoid arthritis    Osteopenia    Biliary cirrhosis    Nephrolithiasis    Type 2 diabetes mellitus    Hypothyroid      PSH -- Status post laminectomy    Cataract      FH -- No pertinent family history in first degree relatives      Social History -- denies tobacco, alcohol or illicit drug use    Physical Exam--  Vital Signs Last 24 Hrs  T(F): 98.1 (06 Mar 2024 12:40), Max: 98.5 (06 Mar 2024 01:50)  HR: 90 (06 Mar 2024 12:40) (61 - 104)  BP: 147/83 (06 Mar 2024 12:40) (136/83 - 206/89)  RR: 18 (06 Mar 2024 12:40) (16 - 20)  SpO2: 97% (06 Mar 2024 12:40) (96% - 99%)  General: nontoxic-appearing, no acute distress  HEENT: anicteric  Lungs: Clear bilaterally without rales  Heart: S1, S2, normal rate.   Abdomen: Soft. Nondistended. mild suprapubic tenderness  Neuro: no obvious focal deficits   Back: No costovertebral angle tenderness.  Extremities: No LE edema.   Skin: Warm. Dry. No rash.  Psychiatric: Appropriate affect and mood for situation.     Laboratory & Imaging Data--  CBC:                       11.3   7.40  )-----------( 299      ( 05 Mar 2024 18:43 )             33.1     WBC Count: 7.40 K/uL (24 @ 18:43)  WBC Count: 8.21 K/uL (24 @ 14:48)    CMP:     144  |  108  |  24<H>  ----------------------------<  94  3.4<L>   |  26  |  1.26    Ca    10.6<H>      05 Mar 2024 18:43  Phos  3.4     03-05  Mg     2.0     03-05    TPro  6.5  /  Alb  3.8  /  TBili  0.3  /  DBili  x   /  AST  15  /  ALT  11  /  AlkPhos  85  03-05    LIVER FUNCTIONS - ( 05 Mar 2024 18:43 )  Alb: 3.8 g/dL / Pro: 6.5 g/dL / ALK PHOS: 85 U/L / ALT: 11 U/L / AST: 15 U/L / GGT: x           Urinalysis Basic - ( 06 Mar 2024 00:29 )    Color: Yellow / Appearance: Clear / S.016 / pH: x  Gluc: x / Ketone: Trace mg/dL  / Bili: Negative / Urobili: 0.2 mg/dL   Blood: x / Protein: 30 mg/dL / Nitrite: Negative   Leuk Esterase: Moderate / RBC: 1 /HPF / WBC 50 /HPF   Sq Epi: x / Non Sq Epi: 7 /HPF / Bacteria: Negative /HPF      Microbiology:       Radiology--  ***  Active Medications--  cefTRIAXone   IVPB      cefTRIAXone   IVPB 1000 milliGRAM(s) IV Intermittent every 24 hours  enoxaparin Injectable 40 milliGRAM(s) SubCutaneous every 24 hours  hydrALAZINE 25 milliGRAM(s) Oral two times a day  hydrALAZINE Injectable 10 milliGRAM(s) IV Push every 8 hours PRN  lactated ringers. 1000 milliLiter(s) IV Continuous <Continuous>  metoprolol tartrate 12.5 milliGRAM(s) Oral two times a day  pantoprazole  Injectable 40 milliGRAM(s) IV Push daily    Antimicrobials:   cefTRIAXone   IVPB      cefTRIAXone   IVPB 1000 milliGRAM(s) IV Intermittent every 24 hours    Immunologic:    [facility], Division of Infectious Diseases  ROLANDO Hanson S. Shah, Y. Patel, G. [facility]  [phone]  ([phone] - weekdays after 5pm and weekends)    DEBBIE NUÑEZ  84y, Female  [id number]    HPI--  HPI:  84-year-old female with past medical history of diabetes, hypertension, rheumatoid arthritis, GERD presenting to emergency department brought in by EMS for generalized weakness.   Patient was seen in the emergency department 4 days prior for abdominal discomfort and diarrhea, was diagnosed with a urinary tract infection and discharged home on oral antibiotics.  Patient states the antibiotics are giving her an upset stomach so only took 1 day of antibiotics before stopping.  Patient accompanied by daughter who states patient has not been eating for the past week.    Patient states food does not taste good, has no desire to eat.  Daughter states she has had increasing weakness, today was unable to get up out of chair unassisted.  Daughter takes care of patient at home without any support.  Daughter feels unsafe taking care of mother at this time.  Denies fever, headache, vision change, chest pain, shortness of breath, abdominal pain, nausea, vomiting, rash.   (05 Mar 2024 19:47)  She reports abd pain and urge to urinate though very little urine comes out. Denies fever. Was recently in ED and given ceftriaxone then discharged home on cefpodoxime. Reports she had an upset stomach afterwards so she stopped taking the antibiotic.    ROS: 10 point review of systems completed, pertinent positives and negatives as per HPI.    Allergies: Remicade (Anaphylaxis)    PMH -- Sjoegren syndrome    Rheumatoid arthritis    Osteopenia    Biliary cirrhosis    Nephrolithiasis    Type 2 diabetes mellitus    Hypothyroid      PSH -- Status post laminectomy    Cataract      FH -- No pertinent family history in first degree relatives      Social History -- denies tobacco, alcohol or illicit drug use    Physical Exam--  Vital Signs Last 24 Hrs  T(F): 98.1 (06 Mar 2024 12:40), Max: 98.5 (06 Mar 2024 01:50)  HR: 90 (06 Mar 2024 12:40) (61 - 104)  BP: 147/83 (06 Mar 2024 12:40) (136/83 - 206/89)  RR: 18 (06 Mar 2024 12:40) (16 - 20)  SpO2: 97% (06 Mar 2024 12:40) (96% - 99%)  General: nontoxic-appearing, no acute distress  HEENT: anicteric  Lungs: Clear bilaterally without rales  Heart: S1, S2, normal rate.   Abdomen: Soft. Nondistended. mild suprapubic tenderness  Neuro: no obvious focal deficits   Back: No costovertebral angle tenderness.  Extremities: No LE edema.   Skin: Warm. Dry. No rash.  Psychiatric: Appropriate affect and mood for situation.     Laboratory & Imaging Data--  CBC:                       11.3   7.40  )-----------( 299      ( 05 Mar 2024 18:43 )             33.1     WBC Count: 7.40 K/uL (24 @ 18:43)  WBC Count: 8.21 K/uL (24 @ 14:48)    CMP:     144  |  108  |  24<H>  ----------------------------<  94  3.4<L>   |  26  |  1.26    Ca    10.6<H>      05 Mar 2024 18:43  Phos  3.4     03-05  Mg     2.0     03-05    TPro  6.5  /  Alb  3.8  /  TBili  0.3  /  DBili  x   /  AST  15  /  ALT  11  /  AlkPhos  85  03-05    LIVER FUNCTIONS - ( 05 Mar 2024 18:43 )  Alb: 3.8 g/dL / Pro: 6.5 g/dL / ALK PHOS: 85 U/L / ALT: 11 U/L / AST: 15 U/L / GGT: x           Urinalysis Basic - ( 06 Mar 2024 00:29 )    Color: Yellow / Appearance: Clear / S.016 / pH: x  Gluc: x / Ketone: Trace mg/dL  / Bili: Negative / Urobili: 0.2 mg/dL   Blood: x / Protein: 30 mg/dL / Nitrite: Negative   Leuk Esterase: Moderate / RBC: 1 /HPF / WBC 50 /HPF   Sq Epi: x / Non Sq Epi: 7 /HPF / Bacteria: Negative /HPF      Microbiology:       Radiology--  ***  Active Medications--  cefTRIAXone   IVPB      cefTRIAXone   IVPB 1000 milliGRAM(s) IV Intermittent every 24 hours  enoxaparin Injectable 40 milliGRAM(s) SubCutaneous every 24 hours  hydrALAZINE 25 milliGRAM(s) Oral two times a day  hydrALAZINE Injectable 10 milliGRAM(s) IV Push every 8 hours PRN  lactated ringers. 1000 milliLiter(s) IV Continuous <Continuous>  metoprolol tartrate 12.5 milliGRAM(s) Oral two times a day  pantoprazole  Injectable 40 milliGRAM(s) IV Push daily    Antimicrobials:   cefTRIAXone   IVPB      cefTRIAXone   IVPB 1000 milliGRAM(s) IV Intermittent every 24 hours    Immunologic:

## 2024-03-06 NOTE — CONSULT NOTE ADULT - ASSESSMENT
84-year-old female with past medical history of diabetes, hypertension, rheumatoid arthritis, GERD presenting to emergency department brought in by EMS for generalized weakness.    UTI  pt reports urgency  urine may be sterile as pt was recently on antibiotics    Recommendations  c/w ceftriaxone for now  f/u urine culture    Joseph Newsome M.D.  [facility], Division of Infectious Diseases  [phone]  After 5pm on weekdays and all day on weekends - please call [phone]  84-year-old female with past medical history of diabetes, hypertension, rheumatoid arthritis, GERD presenting to emergency department brought in by EMS for generalized weakness.    UTI, generalized weakness  pt reports urgency  urine may be sterile as pt was recently on antibiotics  no evidence of PNA on CT    Recommendations  c/w ceftriaxone for now  f/u urine culture    Joseph Newsome M.D.  [facility], Division of Infectious Diseases  [phone]  After 5pm on weekdays and all day on weekends - please call [phone]  84-year-old female with past medical history of diabetes, hypertension, rheumatoid arthritis, GERD presenting to emergency department brought in by EMS for generalized weakness.    UTI, generalized weakness  pt reports urgency  urine may be sterile as pt was recently on antibiotics  no evidence of PNA on CT  no evidence of SSTI on exam    Recommendations  c/w ceftriaxone for now  f/u urine culture    Joseph Newsome M.D.  [facility], Division of Infectious Diseases  [phone]  After 5pm on weekdays and all day on weekends - please call [phone]

## 2024-03-07 LAB
A1C WITH ESTIMATED AVERAGE GLUCOSE RESULT: 5.7 % — HIGH (ref 4–5.6)
ANION GAP SERPL CALC-SCNC: 14 MMOL/L — SIGNIFICANT CHANGE UP (ref 5–17)
BUN SERPL-MCNC: 20 MG/DL — SIGNIFICANT CHANGE UP (ref 7–23)
CALCIUM SERPL-MCNC: 10.2 MG/DL — SIGNIFICANT CHANGE UP (ref 8.4–10.5)
CHLORIDE SERPL-SCNC: 108 MMOL/L — SIGNIFICANT CHANGE UP (ref 96–108)
CK SERPL-CCNC: 27 U/L — SIGNIFICANT CHANGE UP (ref 25–170)
CO2 SERPL-SCNC: 21 MMOL/L — LOW (ref 22–31)
CREAT SERPL-MCNC: 1.19 MG/DL — SIGNIFICANT CHANGE UP (ref 0.5–1.3)
CULTURE RESULTS: SIGNIFICANT CHANGE UP
EGFR: 46 ML/MIN/1.73M2 — LOW
ESTIMATED AVERAGE GLUCOSE: 117 MG/DL — HIGH (ref 68–114)
FOLATE SERPL-MCNC: 9.9 NG/ML — SIGNIFICANT CHANGE UP
GLUCOSE SERPL-MCNC: 87 MG/DL — SIGNIFICANT CHANGE UP (ref 70–99)
HCT VFR BLD CALC: 35.3 % — SIGNIFICANT CHANGE UP (ref 34.5–45)
HGB BLD-MCNC: 11.5 G/DL — SIGNIFICANT CHANGE UP (ref 11.5–15.5)
MCHC RBC-ENTMCNC: 28.1 PG — SIGNIFICANT CHANGE UP (ref 27–34)
MCHC RBC-ENTMCNC: 32.6 GM/DL — SIGNIFICANT CHANGE UP (ref 32–36)
MCV RBC AUTO: 86.3 FL — SIGNIFICANT CHANGE UP (ref 80–100)
NRBC # BLD: 0 /100 WBCS — SIGNIFICANT CHANGE UP (ref 0–0)
PLATELET # BLD AUTO: 343 K/UL — SIGNIFICANT CHANGE UP (ref 150–400)
POTASSIUM SERPL-MCNC: 3.5 MMOL/L — SIGNIFICANT CHANGE UP (ref 3.5–5.3)
POTASSIUM SERPL-SCNC: 3.5 MMOL/L — SIGNIFICANT CHANGE UP (ref 3.5–5.3)
RBC # BLD: 4.09 M/UL — SIGNIFICANT CHANGE UP (ref 3.8–5.2)
RBC # FLD: 14.9 % — HIGH (ref 10.3–14.5)
SODIUM SERPL-SCNC: 143 MMOL/L — SIGNIFICANT CHANGE UP (ref 135–145)
SPECIMEN SOURCE: SIGNIFICANT CHANGE UP
T3 SERPL-MCNC: 55 NG/DL — LOW (ref 80–200)
T4 AB SER-ACNC: 6.1 UG/DL — SIGNIFICANT CHANGE UP (ref 4.6–12)
TSH SERPL-MCNC: 5.87 UIU/ML — HIGH (ref 0.27–4.2)
VIT B12 SERPL-MCNC: 643 PG/ML — SIGNIFICANT CHANGE UP (ref 232–1245)
WBC # BLD: 8.73 K/UL — SIGNIFICANT CHANGE UP (ref 3.8–10.5)
WBC # FLD AUTO: 8.73 K/UL — SIGNIFICANT CHANGE UP (ref 3.8–10.5)

## 2024-03-07 PROCEDURE — 74177 CT ABD & PELVIS W/CONTRAST: CPT | Mod: 26

## 2024-03-07 PROCEDURE — 70450 CT HEAD/BRAIN W/O DYE: CPT | Mod: 26

## 2024-03-07 RX ORDER — LISINOPRIL 2.5 MG/1
2.5 TABLET ORAL DAILY
Refills: 0 | Status: DISCONTINUED | OUTPATIENT
Start: 2024-03-07 | End: 2024-03-08

## 2024-03-07 RX ORDER — FOLIC ACID 0.8 MG
1 TABLET ORAL DAILY
Refills: 0 | Status: DISCONTINUED | OUTPATIENT
Start: 2024-03-07 | End: 2024-03-08

## 2024-03-07 RX ORDER — HYDRALAZINE HCL 50 MG
25 TABLET ORAL THREE TIMES A DAY
Refills: 0 | Status: DISCONTINUED | OUTPATIENT
Start: 2024-03-07 | End: 2024-03-08

## 2024-03-07 RX ORDER — METOPROLOL TARTRATE 50 MG
12.5 TABLET ORAL ONCE
Refills: 0 | Status: COMPLETED | OUTPATIENT
Start: 2024-03-07 | End: 2024-03-07

## 2024-03-07 RX ORDER — METOPROLOL TARTRATE 50 MG
25 TABLET ORAL DAILY
Refills: 0 | Status: DISCONTINUED | OUTPATIENT
Start: 2024-03-08 | End: 2024-03-08

## 2024-03-07 RX ORDER — PREGABALIN 225 MG/1
1000 CAPSULE ORAL DAILY
Refills: 0 | Status: DISCONTINUED | OUTPATIENT
Start: 2024-03-07 | End: 2024-03-08

## 2024-03-07 RX ADMIN — Medication 12.5 MILLIGRAM(S): at 05:25

## 2024-03-07 RX ADMIN — Medication 12.5 MILLIGRAM(S): at 22:02

## 2024-03-07 RX ADMIN — Medication 12.5 MILLIGRAM(S): at 05:24

## 2024-03-07 RX ADMIN — Medication 2 MILLIGRAM(S): at 05:24

## 2024-03-07 RX ADMIN — PANTOPRAZOLE SODIUM 40 MILLIGRAM(S): 20 TABLET, DELAYED RELEASE ORAL at 14:04

## 2024-03-07 RX ADMIN — Medication 2 MILLIGRAM(S): at 17:29

## 2024-03-07 RX ADMIN — ONDANSETRON 4 MILLIGRAM(S): 8 TABLET, FILM COATED ORAL at 22:01

## 2024-03-07 RX ADMIN — LISINOPRIL 2.5 MILLIGRAM(S): 2.5 TABLET ORAL at 14:19

## 2024-03-07 RX ADMIN — SODIUM CHLORIDE 75 MILLILITER(S): 9 INJECTION, SOLUTION INTRAVENOUS at 22:03

## 2024-03-07 RX ADMIN — ENOXAPARIN SODIUM 40 MILLIGRAM(S): 100 INJECTION SUBCUTANEOUS at 05:22

## 2024-03-07 RX ADMIN — Medication 25 MILLIGRAM(S): at 22:02

## 2024-03-07 RX ADMIN — Medication 12.5 MILLIGRAM(S): at 00:10

## 2024-03-07 RX ADMIN — FAMOTIDINE 20 MILLIGRAM(S): 10 INJECTION INTRAVENOUS at 14:04

## 2024-03-07 RX ADMIN — Medication 12.5 MILLIGRAM(S): at 18:30

## 2024-03-07 RX ADMIN — PREGABALIN 1000 MICROGRAM(S): 225 CAPSULE ORAL at 14:19

## 2024-03-07 RX ADMIN — Medication 105 MILLIGRAM(S): at 14:04

## 2024-03-07 RX ADMIN — Medication 12.5 MILLIGRAM(S): at 14:19

## 2024-03-07 RX ADMIN — Medication 1 MILLIGRAM(S): at 14:19

## 2024-03-07 RX ADMIN — Medication 25 MILLIGRAM(S): at 05:24

## 2024-03-07 RX ADMIN — CEFTRIAXONE 100 MILLIGRAM(S): 500 INJECTION, POWDER, FOR SOLUTION INTRAMUSCULAR; INTRAVENOUS at 19:32

## 2024-03-07 RX ADMIN — Medication 1 TABLET(S): at 14:04

## 2024-03-07 RX ADMIN — ONDANSETRON 4 MILLIGRAM(S): 8 TABLET, FILM COATED ORAL at 14:19

## 2024-03-07 NOTE — DIETITIAN INITIAL EVALUATION ADULT - ADD RECOMMEND
1) New malnutrition notification sent.   2) Recommend regular diet   3) Recommend Ensure Plus HP 3x/day   4) Monitor and encourage PO intake. Encourage use of daily menus. Honor dietary preferences as expressed as able.   5) Monitor PO intake/tolerance, weights, labs, hydration status, bowels, and skin integrity.

## 2024-03-07 NOTE — DIETITIAN INITIAL EVALUATION ADULT - ENERGY INTAKE
Poor (<50%) - Pt reports poor appetite/PO intake, noted pt is not currently ordered for a diet. Is amenable to receiving Ensure Plus shakes TID.

## 2024-03-07 NOTE — DIETITIAN INITIAL EVALUATION ADULT - REASON INDICATOR FOR ASSESSMENT
Nutrition consult warranted for: nutrition services assessment  Information obtained from: electronic medical record and patient  Chart reviewed, events noted.

## 2024-03-07 NOTE — DIETITIAN INITIAL EVALUATION ADULT - NSFNSGIIOFT_GEN_A_CORE
- Pt denies nausea, vomiting or constipation. Reports diarrhea, resolving?   - Last BM: 03/07; ordered for Imodium

## 2024-03-07 NOTE — DIETITIAN INITIAL EVALUATION ADULT - ORAL INTAKE PTA/DIET HISTORY
Pt reports having a decreased appetite and PO intake x 2 weeks PTA; consuming <50% of most meals. States she is experiencing loss of taste. Follows regular diet. Pt denies any known food allergies or intolerances. Pt denies any micronutrient supplementation at home. Denies any difficulty chewing/swallowing at this time.

## 2024-03-07 NOTE — PATIENT PROFILE ADULT - FALL HARM RISK - HARM RISK INTERVENTIONS

## 2024-03-07 NOTE — DIETITIAN INITIAL EVALUATION ADULT - OTHER INFO
- Renal: IVF: Lactated Ringers @ 75 ml/hr  - Endo: No antidiabetic medication ordered at this time.

## 2024-03-07 NOTE — DIETITIAN INITIAL EVALUATION ADULT - PERTINENT MEDS FT
MEDICATIONS  (STANDING):  cefTRIAXone   IVPB      cefTRIAXone   IVPB 1000 milliGRAM(s) IV Intermittent every 24 hours  enoxaparin Injectable 40 milliGRAM(s) SubCutaneous every 24 hours  famotidine Injectable 20 milliGRAM(s) IV Push daily  hydrALAZINE 25 milliGRAM(s) Oral two times a day  lactated ringers. 1000 milliLiter(s) (75 mL/Hr) IV Continuous <Continuous>  loperamide 2 milliGRAM(s) Oral two times a day  meclizine 12.5 milliGRAM(s) Oral three times a day  metoprolol tartrate 12.5 milliGRAM(s) Oral two times a day  multivitamin 1 Tablet(s) Oral daily  ondansetron Injectable 4 milliGRAM(s) IV Push once  ondansetron Injectable 4 milliGRAM(s) IV Push every 8 hours  pantoprazole  Injectable 40 milliGRAM(s) IV Push daily  thiamine IVPB 500 milliGRAM(s) IV Intermittent daily    MEDICATIONS  (PRN):  hydrALAZINE Injectable 10 milliGRAM(s) IV Push every 8 hours PRN SBP>169

## 2024-03-08 ENCOUNTER — TRANSCRIPTION ENCOUNTER (OUTPATIENT)
Age: 84
End: 2024-03-08

## 2024-03-08 VITALS
OXYGEN SATURATION: 96 % | DIASTOLIC BLOOD PRESSURE: 80 MMHG | HEART RATE: 66 BPM | TEMPERATURE: 99 F | RESPIRATION RATE: 18 BRPM | SYSTOLIC BLOOD PRESSURE: 121 MMHG

## 2024-03-08 LAB
ANION GAP SERPL CALC-SCNC: 10 MMOL/L — SIGNIFICANT CHANGE UP (ref 5–17)
BUN SERPL-MCNC: 9 MG/DL — SIGNIFICANT CHANGE UP (ref 7–23)
CALCIUM SERPL-MCNC: 9.1 MG/DL — SIGNIFICANT CHANGE UP (ref 8.4–10.5)
CHLORIDE SERPL-SCNC: 110 MMOL/L — HIGH (ref 96–108)
CO2 SERPL-SCNC: 18 MMOL/L — LOW (ref 22–31)
CREAT SERPL-MCNC: 0.34 MG/DL — LOW (ref 0.5–1.3)
EGFR: 103 ML/MIN/1.73M2 — SIGNIFICANT CHANGE UP
GLUCOSE SERPL-MCNC: 120 MG/DL — HIGH (ref 70–99)
POTASSIUM SERPL-MCNC: 3.7 MMOL/L — SIGNIFICANT CHANGE UP (ref 3.5–5.3)
POTASSIUM SERPL-SCNC: 3.7 MMOL/L — SIGNIFICANT CHANGE UP (ref 3.5–5.3)
SODIUM SERPL-SCNC: 138 MMOL/L — SIGNIFICANT CHANGE UP (ref 135–145)

## 2024-03-08 PROCEDURE — 85027 COMPLETE CBC AUTOMATED: CPT

## 2024-03-08 PROCEDURE — 87637 SARSCOV2&INF A&B&RSV AMP PRB: CPT

## 2024-03-08 PROCEDURE — 85730 THROMBOPLASTIN TIME PARTIAL: CPT

## 2024-03-08 PROCEDURE — 74177 CT ABD & PELVIS W/CONTRAST: CPT | Mod: MC

## 2024-03-08 PROCEDURE — 81001 URINALYSIS AUTO W/SCOPE: CPT

## 2024-03-08 PROCEDURE — 84443 ASSAY THYROID STIM HORMONE: CPT

## 2024-03-08 PROCEDURE — 84100 ASSAY OF PHOSPHORUS: CPT

## 2024-03-08 PROCEDURE — 82746 ASSAY OF FOLIC ACID SERUM: CPT

## 2024-03-08 PROCEDURE — 93306 TTE W/DOPPLER COMPLETE: CPT

## 2024-03-08 PROCEDURE — 83735 ASSAY OF MAGNESIUM: CPT

## 2024-03-08 PROCEDURE — 87086 URINE CULTURE/COLONY COUNT: CPT

## 2024-03-08 PROCEDURE — 97161 PT EVAL LOW COMPLEX 20 MIN: CPT

## 2024-03-08 PROCEDURE — 71045 X-RAY EXAM CHEST 1 VIEW: CPT

## 2024-03-08 PROCEDURE — 82550 ASSAY OF CK (CPK): CPT

## 2024-03-08 PROCEDURE — 84480 ASSAY TRIIODOTHYRONINE (T3): CPT

## 2024-03-08 PROCEDURE — 71250 CT THORAX DX C-: CPT | Mod: MC

## 2024-03-08 PROCEDURE — 70450 CT HEAD/BRAIN W/O DYE: CPT | Mod: MC

## 2024-03-08 PROCEDURE — 97164 PT RE-EVAL EST PLAN CARE: CPT

## 2024-03-08 PROCEDURE — 85610 PROTHROMBIN TIME: CPT

## 2024-03-08 PROCEDURE — 83036 HEMOGLOBIN GLYCOSYLATED A1C: CPT

## 2024-03-08 PROCEDURE — 93880 EXTRACRANIAL BILAT STUDY: CPT

## 2024-03-08 PROCEDURE — 85025 COMPLETE CBC W/AUTO DIFF WBC: CPT

## 2024-03-08 PROCEDURE — 82607 VITAMIN B-12: CPT

## 2024-03-08 PROCEDURE — 99285 EMERGENCY DEPT VISIT HI MDM: CPT

## 2024-03-08 PROCEDURE — 80053 COMPREHEN METABOLIC PANEL: CPT

## 2024-03-08 PROCEDURE — 84630 ASSAY OF ZINC: CPT

## 2024-03-08 PROCEDURE — 84436 ASSAY OF TOTAL THYROXINE: CPT

## 2024-03-08 PROCEDURE — 80048 BASIC METABOLIC PNL TOTAL CA: CPT

## 2024-03-08 PROCEDURE — 96374 THER/PROPH/DIAG INJ IV PUSH: CPT

## 2024-03-08 RX ORDER — DULOXETINE HYDROCHLORIDE 30 MG/1
1 CAPSULE, DELAYED RELEASE ORAL
Refills: 0 | DISCHARGE

## 2024-03-08 RX ORDER — METOPROLOL TARTRATE 50 MG
1 TABLET ORAL
Qty: 30 | Refills: 0
Start: 2024-03-08 | End: 2024-04-06

## 2024-03-08 RX ORDER — LOSARTAN POTASSIUM 100 MG/1
1 TABLET, FILM COATED ORAL
Qty: 30 | Refills: 0
Start: 2024-03-08 | End: 2024-04-06

## 2024-03-08 RX ORDER — FOLIC ACID 0.8 MG
1 TABLET ORAL
Qty: 30 | Refills: 0
Start: 2024-03-08 | End: 2024-04-06

## 2024-03-08 RX ORDER — GABAPENTIN 400 MG/1
1 CAPSULE ORAL
Refills: 0 | DISCHARGE

## 2024-03-08 RX ORDER — METFORMIN HYDROCHLORIDE 850 MG/1
2 TABLET ORAL
Refills: 0 | DISCHARGE

## 2024-03-08 RX ORDER — HYDRALAZINE HCL 50 MG
1 TABLET ORAL
Qty: 90 | Refills: 0
Start: 2024-03-08 | End: 2024-04-06

## 2024-03-08 RX ORDER — PREGABALIN 225 MG/1
1 CAPSULE ORAL
Qty: 30 | Refills: 0
Start: 2024-03-08 | End: 2024-04-06

## 2024-03-08 RX ADMIN — ENOXAPARIN SODIUM 40 MILLIGRAM(S): 100 INJECTION SUBCUTANEOUS at 05:38

## 2024-03-08 RX ADMIN — Medication 12.5 MILLIGRAM(S): at 05:41

## 2024-03-08 RX ADMIN — Medication 25 MILLIGRAM(S): at 05:38

## 2024-03-08 RX ADMIN — LISINOPRIL 2.5 MILLIGRAM(S): 2.5 TABLET ORAL at 05:40

## 2024-03-08 RX ADMIN — Medication 1 TABLET(S): at 11:44

## 2024-03-08 RX ADMIN — FAMOTIDINE 20 MILLIGRAM(S): 10 INJECTION INTRAVENOUS at 11:43

## 2024-03-08 RX ADMIN — PREGABALIN 1000 MICROGRAM(S): 225 CAPSULE ORAL at 11:44

## 2024-03-08 RX ADMIN — Medication 1 MILLIGRAM(S): at 11:44

## 2024-03-08 RX ADMIN — Medication 25 MILLIGRAM(S): at 13:51

## 2024-03-08 RX ADMIN — Medication 2 MILLIGRAM(S): at 05:39

## 2024-03-08 RX ADMIN — Medication 105 MILLIGRAM(S): at 11:43

## 2024-03-08 RX ADMIN — Medication 12.5 MILLIGRAM(S): at 13:51

## 2024-03-08 RX ADMIN — PANTOPRAZOLE SODIUM 40 MILLIGRAM(S): 20 TABLET, DELAYED RELEASE ORAL at 11:44

## 2024-03-08 RX ADMIN — Medication 25 MILLIGRAM(S): at 05:39

## 2024-03-08 NOTE — PHYSICAL THERAPY INITIAL EVALUATION ADULT - ADDITIONAL COMMENTS
pt resides in an apartment alone with elevator access. pt independent with all ADLs and ambulation with a rolling walker.
pt resides in an apartment alone with elevator access. pt independent with all ADLs and ambulation with a rolling walker.

## 2024-03-08 NOTE — PHYSICAL THERAPY INITIAL EVALUATION ADULT - PATIENT PROFILE REVIEW, REHAB EVAL
Spinal Block  Performed by: Jasiel Higgins MD  Authorized by: Jasiel Higgins MD       General Information and Staff    Start Time:   Anesthesiologist: Jasiel Higgins MD  Performed by:   Anesthesiologist  Patient Location:  OR  Site identification: surface l
yes
yes

## 2024-03-08 NOTE — DISCHARGE NOTE PROVIDER - NSDCMRMEDTOKEN_GEN_ALL_CORE_FT
DULoxetine 30 mg oral delayed release capsule: 1 cap(s) orally once a day - total daily dose 90mg  DULoxetine 60 mg oral delayed release capsule: 1 cap(s) orally once a day - total daily dose 90mg  gabapentin 800 mg oral tablet: 1 tab(s) orally 2 times a day  hydroxychloroquine 200 mg oral tablet: 1 tab(s) orally once a day  levothyroxine 100 mcg (0.1 mg) oral tablet: 1 tab(s) orally once a day  Lyrica 200 mg oral capsule: 1 cap(s) orally 2 times a day  metFORMIN 500 mg oral tablet, extended release: 2 tab(s) orally once a day (in the evening)  [address] 128 ophthalmic ointment: Apply to each eye once a day (at bedtime)  omeprazole 40 mg oral delayed release capsule: 1 cap(s) orally once a day  Systane ophthalmic solution: 1 drop(s) in each eye 4 times a day as needed  zolpidem 5 mg oral tablet: 1 tab(s) orally once a day (at bedtime) As needed Insomnia   cyanocobalamin 1000 mcg oral tablet: 1 tab(s) orally once a day  folic acid 1 mg oral tablet: 1 tab(s) orally once a day  hydrALAZINE 25 mg oral tablet: 1 tab(s) orally 3 times a day  hydroxychloroquine 200 mg oral tablet: 1 tab(s) orally once a day  levothyroxine 100 mcg (0.1 mg) oral tablet: 1 tab(s) orally once a day  losartan 25 mg oral tablet: 1 tab(s) orally once a day  metoprolol succinate 25 mg oral tablet, extended release: 1 tab(s) orally once a day  Ky[address] 128 ophthalmic ointment: Apply to each eye once a day (at bedtime)  omeprazole 40 mg oral delayed release capsule: 1 cap(s) orally once a day  Systane ophthalmic solution: 1 drop(s) in each eye 4 times a day as needed

## 2024-03-08 NOTE — PROGRESS NOTE ADULT - SUBJECTIVE AND OBJECTIVE BOX
[facility]  ROLANDO Hanson Y. Patel, S. Shah, G. Jerod  [phone]  ([phone] - weekdays after 5pm and weekends)    Name: DEBBIE NUÑEZ  Age/Gender: 84y Female  MRN: [id number]    Interval History:  Patient seen and examined this morning.   Feels better, no fever, dysuria or new complaints.   Notes reviewed. No concerning overnight events  Afebrile   Allergies: Remicade (Anaphylaxis)      Objective:  Vitals:   T(F): 98.4 (03-08-24 @ 08:39), Max: 98.4 (03-08-24 @ 08:39)  HR: 67 (03-08-24 @ 08:39) (60 - 67)  BP: 161/84 (03-08-24 @ 08:39) (131/77 - 161/84)  RR: 18 (03-08-24 @ 08:39) (18 - 18)  SpO2: 98% (03-08-24 @ 08:39) (97% - 98%)  Physical Examination:  General: no acute distress, nontoxic   HEENT: NC/AT, anicteric, neck supple  Respiratory: clear to auscultation bilaterally  Cardiovascular: S1 and S2 present, normal rate   Gastrointestinal: soft, nontender, nondistended  Extremities: no edema, no cyanosis  Skin: no visible rash    Laboratory Studies:  CBC:                       11.5   8.73  )-----------( 343      ( 07 Mar 2024 07:33 )             35.3     WBC Trend:  8.73 03-07-24 @ 07:33  7.40 03-05-24 @ 18:43    CMP: 03-08    138  |  110<H>  |  9   ----------------------------<  120<H>  3.7   |  18<L>  |  0.34<L>    Ca    9.1      08 Mar 2024 07:34      Creatinine: 0.34 mg/dL (03-08-24 @ 07:34)  Creatinine: 1.19 mg/dL (03-07-24 @ 07:14)  Creatinine: 1.26 mg/dL (03-05-24 @ 18:43)    Microbiology: reviewed   Culture - Urine (collected 03-06-24 @ 00:29)  Source: Clean Catch Clean Catch (Midstream)  Final Report (03-07-24 @ 07:23):    <10,000 CFU/mL Normal Urogenital Willa    SARS-CoV-2 Result: NotDetec (05 Mar 2024 20:37)    Radiology: reviewed     Medications:  cefTRIAXone   IVPB      cefTRIAXone   IVPB 1000 milliGRAM(s) IV Intermittent every 24 hours  cyanocobalamin 1000 MICROGram(s) Oral daily  enoxaparin Injectable 40 milliGRAM(s) SubCutaneous every 24 hours  famotidine Injectable 20 milliGRAM(s) IV Push daily  folic acid 1 milliGRAM(s) Oral daily  hydrALAZINE 25 milliGRAM(s) Oral three times a day  hydrALAZINE Injectable 10 milliGRAM(s) IV Push every 8 hours PRN  lisinopril 2.5 milliGRAM(s) Oral daily  loperamide 2 milliGRAM(s) Oral two times a day  meclizine 12.5 milliGRAM(s) Oral three times a day  metoprolol succinate ER 25 milliGRAM(s) Oral daily  multivitamin 1 Tablet(s) Oral daily  ondansetron Injectable 4 milliGRAM(s) IV Push once  ondansetron Injectable 4 milliGRAM(s) IV Push every 8 hours  pantoprazole  Injectable 40 milliGRAM(s) IV Push daily  thiamine IVPB 500 milliGRAM(s) IV Intermittent daily    Current Antimicrobials:  cefTRIAXone   IVPB      cefTRIAXone   IVPB 1000 milliGRAM(s) IV Intermittent every 24 hours    Prior/Completed Antimicrobials:  cefTRIAXone   IVPB  cefTRIAXone   IVPB  
Patient is a 84y old  Female who presents with a chief complaint of Weakness     (07 Mar 2024 10:00)      SUBJECTIVE / OVERNIGHT EVENTS: ptn is resting comfortably, TSH is elev at 5.87, HA1C is below 6, VB12 643, Folate 9.9, UCx neg, Carotids and TTE benign    MEDICATIONS  (STANDING):  cefTRIAXone   IVPB      cefTRIAXone   IVPB 1000 milliGRAM(s) IV Intermittent every 24 hours  cyanocobalamin 1000 MICROGram(s) Oral daily  enoxaparin Injectable 40 milliGRAM(s) SubCutaneous every 24 hours  famotidine Injectable 20 milliGRAM(s) IV Push daily  folic acid 1 milliGRAM(s) Oral daily  hydrALAZINE 25 milliGRAM(s) Oral two times a day  lactated ringers. 1000 milliLiter(s) (75 mL/Hr) IV Continuous <Continuous>  lisinopril 2.5 milliGRAM(s) Oral daily  loperamide 2 milliGRAM(s) Oral two times a day  meclizine 12.5 milliGRAM(s) Oral three times a day  metoprolol tartrate 12.5 milliGRAM(s) Oral two times a day  multivitamin 1 Tablet(s) Oral daily  ondansetron Injectable 4 milliGRAM(s) IV Push once  ondansetron Injectable 4 milliGRAM(s) IV Push every 8 hours  pantoprazole  Injectable 40 milliGRAM(s) IV Push daily  thiamine IVPB 500 milliGRAM(s) IV Intermittent daily    MEDICATIONS  (PRN):  hydrALAZINE Injectable 10 milliGRAM(s) IV Push every 8 hours PRN SBP>169      Vital Signs Last 24 Hrs  T(F): 97.7 (03-07-24 @ 08:33), Max: 98.3 (03-06-24 @ 19:45)  HR: 65 (03-07-24 @ 08:33) (65 - 71)  BP: 142/88 (03-07-24 @ 08:33) (142/88 - 174/78)  RR: 19 (03-07-24 @ 08:33) (18 - 19)  SpO2: 99% (03-07-24 @ 08:33) (96% - 99%)  Telemetry:   CAPILLARY BLOOD GLUCOSE        I&O's Summary      PHYSICAL EXAM:  GENERAL: NAD, well-developed  HEAD:  Atraumatic, Normocephalic  EYES: EOMI, PERRLA, conjunctiva and sclera clear  NECK: Supple, No JVD  CHEST/LUNG: Clear to auscultation bilaterally; No wheeze  HEART: Regular rate and rhythm; No murmurs, rubs, or gallops  ABDOMEN: Soft, Nontender, Nondistended; Bowel sounds present  EXTREMITIES:  2+ Peripheral Pulses, No clubbing, cyanosis, or edema  PSYCH: AAOx3  NEUROLOGY: non-focal  SKIN: No rashes or lesions    LABS:                        11.5   8.73  )-----------( 343      ( 07 Mar 2024 07:33 )             35.3     03-07    143  |  108  |  20  ----------------------------<  87  3.5   |  21<L>  |  1.19    Ca    10.2      07 Mar 2024 07:14  Phos  3.4     03-05  Mg     2.0     03-05    TPro  6.5  /  Alb  3.8  /  TBili  0.3  /  DBili  x   /  AST  15  /  ALT  11  /  AlkPhos  85  03-05    PT/INR - ( 05 Mar 2024 18:43 )   PT: 12.5 sec;   INR: 1.14 ratio         PTT - ( 05 Mar 2024 18:43 )  PTT:30.2 sec  CARDIAC MARKERS ( 07 Mar 2024 07:14 )  x     / x     / 27 U/L / x     / x          Urinalysis Basic - ( 07 Mar 2024 07:14 )    Color: x / Appearance: x / SG: x / pH: x  Gluc: 87 mg/dL / Ketone: x  / Bili: x / Urobili: x   Blood: x / Protein: x / Nitrite: x   Leuk Esterase: x / RBC: x / WBC x   Sq Epi: x / Non Sq Epi: x / Bacteria: x        RADIOLOGY & ADDITIONAL TESTS:    Imaging Personally Reviewed:    Consultant(s) Notes Reviewed:      Care Discussed with Consultants/Other Providers:  
Patient is a 84y old  Female who presents with a chief complaint of dyspepsia (08 Mar 2024 08:16)      SUBJECTIVE / OVERNIGHT EVENTS: no nausea, no dizziness, no diarrhea, no back pain , no c/o pain, walking independently, has no PT needs, dc home today, will need a F/U w PMD in less than a week. daughter understands    MEDICATIONS  (STANDING):  cyanocobalamin 1000 MICROGram(s) Oral daily  enoxaparin Injectable 40 milliGRAM(s) SubCutaneous every 24 hours  famotidine Injectable 20 milliGRAM(s) IV Push daily  folic acid 1 milliGRAM(s) Oral daily  hydrALAZINE 25 milliGRAM(s) Oral three times a day  lisinopril 2.5 milliGRAM(s) Oral daily  loperamide 2 milliGRAM(s) Oral two times a day  meclizine 12.5 milliGRAM(s) Oral three times a day  metoprolol succinate ER 25 milliGRAM(s) Oral daily  multivitamin 1 Tablet(s) Oral daily  ondansetron Injectable 4 milliGRAM(s) IV Push once  ondansetron Injectable 4 milliGRAM(s) IV Push every 8 hours  pantoprazole  Injectable 40 milliGRAM(s) IV Push daily  thiamine IVPB 500 milliGRAM(s) IV Intermittent daily    MEDICATIONS  (PRN):  hydrALAZINE Injectable 10 milliGRAM(s) IV Push every 8 hours PRN SBP>169      Vital Signs Last 24 Hrs  T(F): 98.7 (03-08-24 @ 16:27), Max: 98.7 (03-08-24 @ 16:27)  HR: 66 (03-08-24 @ 16:27) (60 - 67)  BP: 121/80 (03-08-24 @ 16:27) (121/80 - 161/84)  RR: 18 (03-08-24 @ 16:27) (18 - 18)  SpO2: 96% (03-08-24 @ 16:27) (96% - 98%)  Telemetry:   CAPILLARY BLOOD GLUCOSE        I&O's Summary    07 Mar 2024 07:01  -  08 Mar 2024 07:00  --------------------------------------------------------  IN: 50 mL / OUT: 0 mL / NET: 50 mL        PHYSICAL EXAM:  GENERAL: NAD, well-developed  HEAD:  Atraumatic, Normocephalic  EYES: EOMI, PERRLA, conjunctiva and sclera clear  NECK: Supple, No JVD  CHEST/LUNG: Clear to auscultation bilaterally; No wheeze  HEART: Regular rate and rhythm; No murmurs, rubs, or gallops  ABDOMEN: Soft, Nontender, Nondistended; Bowel sounds present  EXTREMITIES:  2+ Peripheral Pulses, No clubbing, cyanosis, or edema  PSYCH: AAOx3  NEUROLOGY: non-focal  SKIN: No rashes or lesions    LABS:                        11.5   8.73  )-----------( 343      ( 07 Mar 2024 07:33 )             35.3     03-08    138  |  110<H>  |  9   ----------------------------<  120<H>  3.7   |  18<L>  |  0.34<L>    Ca    9.1      08 Mar 2024 07:34        CARDIAC MARKERS ( 07 Mar 2024 07:14 )  x     / x     / 27 U/L / x     / x          Urinalysis Basic - ( 08 Mar 2024 07:34 )    Color: x / Appearance: x / SG: x / pH: x  Gluc: 120 mg/dL / Ketone: x  / Bili: x / Urobili: x   Blood: x / Protein: x / Nitrite: x   Leuk Esterase: x / RBC: x / WBC x   Sq Epi: x / Non Sq Epi: x / Bacteria: x        RADIOLOGY & ADDITIONAL TESTS:    Imaging Personally Reviewed:    Consultant(s) Notes Reviewed:      Care Discussed with Consultants/Other Providers:  
Date of Service: 03-07-24 @ 16:13    Patient is a 84y old  Female who presents with a chief complaint of Weakness     (07 Mar 2024 10:00)      Any change in ROS:  doing  ok : no sob:  on room air:     MEDICATIONS  (STANDING):  cefTRIAXone   IVPB      cefTRIAXone   IVPB 1000 milliGRAM(s) IV Intermittent every 24 hours  cyanocobalamin 1000 MICROGram(s) Oral daily  enoxaparin Injectable 40 milliGRAM(s) SubCutaneous every 24 hours  famotidine Injectable 20 milliGRAM(s) IV Push daily  folic acid 1 milliGRAM(s) Oral daily  hydrALAZINE 25 milliGRAM(s) Oral three times a day  lactated ringers. 1000 milliLiter(s) (75 mL/Hr) IV Continuous <Continuous>  lisinopril 2.5 milliGRAM(s) Oral daily  loperamide 2 milliGRAM(s) Oral two times a day  meclizine 12.5 milliGRAM(s) Oral three times a day  metoprolol tartrate 12.5 milliGRAM(s) Oral once  multivitamin 1 Tablet(s) Oral daily  ondansetron Injectable 4 milliGRAM(s) IV Push once  ondansetron Injectable 4 milliGRAM(s) IV Push every 8 hours  pantoprazole  Injectable 40 milliGRAM(s) IV Push daily  thiamine IVPB 500 milliGRAM(s) IV Intermittent daily    MEDICATIONS  (PRN):  hydrALAZINE Injectable 10 milliGRAM(s) IV Push every 8 hours PRN SBP>169    Vital Signs Last 24 Hrs  T(C): 36.5 (07 Mar 2024 08:33), Max: 36.8 (06 Mar 2024 19:45)  T(F): 97.7 (07 Mar 2024 08:33), Max: 98.3 (06 Mar 2024 19:45)  HR: 65 (07 Mar 2024 08:33) (65 - 71)  BP: 142/88 (07 Mar 2024 08:33) (142/88 - 174/78)  BP(mean): --  RR: 19 (07 Mar 2024 08:33) (18 - 19)  SpO2: 99% (07 Mar 2024 08:33) (96% - 99%)    Parameters below as of 07 Mar 2024 08:33  Patient On (Oxygen Delivery Method): room air        I&O's Summary        Physical Exam:   GENERAL: NAD, well-groomed, well-developed  HEENT: EMANUEL/   Atraumatic, Normocephalic  ENMT: No tonsillar erythema, exudates, or enlargement; Moist mucous membranes, Good dentition, No lesions  NECK: Supple, No JVD, Normal thyroid  CHEST/LUNG: Clear to auscultaion- no wheezing  CVS: Regular rate and rhythm; No murmurs, rubs, or gallops  GI: : Soft, Nontender, Nondistended; Bowel sounds present  NERVOUS SYSTEM:  Alert & Oriented X3  EXTREMITIES:  2+ Peripheral Pulses, No clubbing, cyanosis, or edema  LYMPH: No lymphadenopathy noted  SKIN: No rashes or lesions  ENDOCRINOLOGY: No Thyromegaly  PSYCH: calm     Labs:  25  CARDIAC MARKERS ( 07 Mar 2024 07:14 )  x     / x     / 27 U/L / x     / x                                11.5   8.73  )-----------( 343      ( 07 Mar 2024 07:33 )             35.3                         11.3   7.40  )-----------( 299      ( 05 Mar 2024 18:43 )             33.1     03-07    143  |  108  |  20  ----------------------------<  87  3.5   |  21<L>  |  1.19  03-05    144  |  108  |  24<H>  ----------------------------<  94  3.4<L>   |  26  |  1.26    Ca    10.2      07 Mar 2024 07:14  Ca    10.6<H>      05 Mar 2024 18:43  Phos  3.4     03-05  Mg     2.0     03-05    TPro  6.5  /  Alb  3.8  /  TBili  0.3  /  DBili  x   /  AST  15  /  ALT  11  /  AlkPhos  85  03-05    CAPILLARY BLOOD GLUCOSE          LIVER FUNCTIONS - ( 05 Mar 2024 18:43 )  Alb: 3.8 g/dL / Pro: 6.5 g/dL / ALK PHOS: 85 U/L / ALT: 11 U/L / AST: 15 U/L / GGT: x           PT/INR - ( 05 Mar 2024 18:43 )   PT: 12.5 sec;   INR: 1.14 ratio         PTT - ( 05 Mar 2024 18:43 )  PTT:30.2 sec  Urinalysis Basic - ( 07 Mar 2024 07:14 )    Color: x / Appearance: x / SG: x / pH: x  Gluc: 87 mg/dL / Ketone: x  / Bili: x / Urobili: x   Blood: x / Protein: x / Nitrite: x   Leuk Esterase: x / RBC: x / WBC x   Sq Epi: x / Non Sq Epi: x / Bacteria: x            RECENT CULTURES:  03-06 @ 00:29 Clean Catch Clean Catch (Midstream)       rad< from: CT Chest No Cont (03.05.24 @ 22:35) >  annulus. The main pulmonary artery is mildly dilated measuring up to 3.4   cm.The esophagus is unremarkable.    Lung: The central tracheobronchial tree is patent. There is no focal   consolidation. There is no suspicious pulmonary nodule/mass. There are   few sub-4 mm centrilobular nodules in bilateral lungs (for instance 3 mm   micronodule in right upper lobe on series 302, image 128 and 3 mm   micronodule in left upper lobeon series 302, image 121). There is mild   bibasilar atelectasis    Pleura: There is no pleural effusion or pneumothorax.    Abdomen: There are right renal cyst. The largest on measuring up to 3.4   cm in diameter. In addition, there is a nonobstructing right renal   calculus measuring up to 9 mm. There is an 9 mm left adrenal nodule   measuring about 5 Hounsfield units on CT attenuation, likely an adenoma.   Limited evaluation of liver, spleen, pancreas, adrenal glands, and upper   pole of left kidney is unremarkable.    Bone and Soft Tissue: There is no evidence of osteosclerotic or   osteolytic lesions. There are multilevel degenerative changes of thoracic   spine , with disk space narrowing and osteophytosis. The soft tissue is   grossly normal.    IMPRESSION:  Few sub-4 mm micronodules in bilateral lungs. According to Fleischner   Society guideline for management of incidental pulmonary nodules,   optional follow-up CT chest in 12 months is recommended in a high risk   patient.    --- End of Report ---            MAHAD PRAJAPATI MD; Attending Radiologist  This document has been electronically signed. Mar  6 2024  9:04AM    < end of copied text >           <10,000 CFU/mL Normal Urogenital Willa          RESPIRATORY CULTURES:          Studies  Chest X-RAY  CT SCAN Chest   Venous Dopplers: LE:   CT Abdomen  Others              
[facility]  ROLANDO Hanson Y. Patel, S. Shah, G. [facility]  [phone]  ([phone] - weekdays after 5pm and weekends)    Name: DEBBIE NUÑEZ  Age/Gender: 84y Female  MRN: [id number]    Interval History:  Patient seen and examined this morning.  Feels tired, denies fever or any other complaint.s   Notes reviewed  No concerning overnight events  Afebrile   Allergies: Remicade (Anaphylaxis)      Objective:  Vitals:   T(F): 97.7 (03-07-24 @ 08:33), Max: 98.3 (03-06-24 @ 19:45)  HR: 65 (03-07-24 @ 08:33) (65 - 90)  BP: 142/88 (03-07-24 @ 08:33) (142/88 - 174/78)  RR: 19 (03-07-24 @ 08:33) (18 - 19)  SpO2: 99% (03-07-24 @ 08:33) (96% - 99%)  Physical Examination:  General: no acute distress  HEENT: NC/AT, anicteric, neck supple  Respiratory: no acc muscle use, breathing comfortably  Cardiovascular: S1 and S2 present  Gastrointestinal: soft, nontender, nondistended  Extremities: no edema, no cyanosis  Skin: no visible rash    Laboratory Studies:  CBC:                       11.5   8.73  )-----------( 343      ( 07 Mar 2024 07:33 )             35.3     WBC Trend:  8.73 03-07-24 @ 07:33  7.40 03-05-24 @ 18:43  8.21 03-01-24 @ 14:48    CMP: 03-07    143  |  108  |  20  ----------------------------<  87  3.5   |  21<L>  |  1.19    Ca    10.2      07 Mar 2024 07:14  Phos  3.4     03-05  Mg     2.0     03-05    TPro  6.5  /  Alb  3.8  /  TBili  0.3  /  DBili  x   /  AST  15  /  ALT  11  /  AlkPhos  85  03-05    Creatinine: 1.19 mg/dL (03-07-24 @ 07:14)  Creatinine: 1.26 mg/dL (03-05-24 @ 18:43)  Creatinine: 1.19 mg/dL (03-01-24 @ 14:48)      LIVER FUNCTIONS - ( 05 Mar 2024 18:43 )  Alb: 3.8 g/dL / Pro: 6.5 g/dL / ALK PHOS: 85 U/L / ALT: 11 U/L / AST: 15 U/L / GGT: x           Urinalysis + Microscopic Examination (03.06.24 @ 00:29)    pH Urine: 6.0   Urine Appearance: Clear   Color: Yellow   Specific Gravity: 1.016   Protein, Urine: 30 mg/dL   Glucose Qualitative, Urine: 250 mg/dL   Ketone - Urine: Trace mg/dL   Blood, Urine: Negative   Bilirubin: Negative   Urobilinogen: 0.2 mg/dL   Leukocyte Esterase Concentration: Moderate   Nitrite: Negative   White Blood Cell - Urine: 50 /HPF   Red Blood Cell - Urine: 1 /HPF   Bacteria: Negative /HPF   Cast: 1 /LPF   Epithelial Cells: 7 /HPF    Microbiology: reviewed   Culture - Urine (collected 03-06-24 @ 00:29)  Source: Clean Catch Clean Catch (Midstream)  Final Report (03-07-24 @ 07:23):    <10,000 CFU/mL Normal Urogenital Willa    SARS-CoV-2 Result: NotDetec (05 Mar 2024 20:37)    Radiology: reviewed     Medications:  cefTRIAXone   IVPB      cefTRIAXone   IVPB 1000 milliGRAM(s) IV Intermittent every 24 hours  enoxaparin Injectable 40 milliGRAM(s) SubCutaneous every 24 hours  famotidine Injectable 20 milliGRAM(s) IV Push daily  hydrALAZINE 25 milliGRAM(s) Oral two times a day  hydrALAZINE Injectable 10 milliGRAM(s) IV Push every 8 hours PRN  lactated ringers. 1000 milliLiter(s) IV Continuous <Continuous>  loperamide 2 milliGRAM(s) Oral two times a day  meclizine 12.5 milliGRAM(s) Oral three times a day  metoprolol tartrate 12.5 milliGRAM(s) Oral two times a day  multivitamin 1 Tablet(s) Oral daily  ondansetron Injectable 4 milliGRAM(s) IV Push once  ondansetron Injectable 4 milliGRAM(s) IV Push every 8 hours  pantoprazole  Injectable 40 milliGRAM(s) IV Push daily  thiamine IVPB 500 milliGRAM(s) IV Intermittent daily    Current Antimicrobials:  cefTRIAXone   IVPB      cefTRIAXone   IVPB 1000 milliGRAM(s) IV Intermittent every 24 hours    Prior/Completed Antimicrobials:  cefTRIAXone   IVPB  cefTRIAXone   IVPB  
Patient is a 84y Female     Patient is a 84y old  Female who presents with a chief complaint of weakness (06 Mar 2024 15:10)      HPI:  84-year-old female with past medical history of diabetes, hypertension, rheumatoid arthritis, GERD presenting to emergency department brought in by EMS for generalized weakness.   Patient was seen in the emergency department 4 days prior for abdominal discomfort and diarrhea, was diagnosed with a urinary tract infection and discharged home on oral antibiotics.  Patient states the antibiotics are giving her an upset stomach so only took 1 day of antibiotics before stopping.  Patient accompanied by daughter who states patient has not been eating for the past week.    Patient states food does not taste good, has no desire to eat.  Daughter states she has had increasing weakness, today was unable to get up out of chair unassisted.  Daughter takes care of patient at home without any support.  Daughter feels unsafe taking care of mother at this time.  Denies fever, headache, vision change, chest pain, shortness of breath, abdominal pain, nausea, vomiting, rash.   (05 Mar 2024 19:47)      PAST MEDICAL & SURGICAL HISTORY:  Sjoegren syndrome      Rheumatoid arthritis      Osteopenia      Biliary cirrhosis      Nephrolithiasis      Type 2 diabetes mellitus      Hypothyroid      Status post laminectomy      Cataract  left eye cataract surgery 3/3/2014          MEDICATIONS  (STANDING):  cefTRIAXone   IVPB      cefTRIAXone   IVPB 1000 milliGRAM(s) IV Intermittent every 24 hours  enoxaparin Injectable 40 milliGRAM(s) SubCutaneous every 24 hours  famotidine Injectable 20 milliGRAM(s) IV Push daily  hydrALAZINE 25 milliGRAM(s) Oral two times a day  lactated ringers. 1000 milliLiter(s) (75 mL/Hr) IV Continuous <Continuous>  loperamide 2 milliGRAM(s) Oral two times a day  meclizine 12.5 milliGRAM(s) Oral three times a day  metoprolol tartrate 12.5 milliGRAM(s) Oral two times a day  multivitamin 1 Tablet(s) Oral daily  ondansetron Injectable 4 milliGRAM(s) IV Push once  ondansetron Injectable 4 milliGRAM(s) IV Push every 8 hours  pantoprazole  Injectable 40 milliGRAM(s) IV Push daily  thiamine IVPB 500 milliGRAM(s) IV Intermittent daily      Allergies    Remicade (Anaphylaxis)    Intolerances        SOCIAL HISTORY:  Denies ETOh,Smoking,     FAMILY HISTORY:      REVIEW OF SYSTEMS:    CONSTITUTIONAL: No weakness, fevers or chills  EYES/ENT: No visual changes;  No vertigo or throat pain   NECK: No pain or stiffness  RESPIRATORY: No cough, wheezing, hemoptysis; No shortness of breath  CARDIOVASCULAR: No chest pain or palpitations  GASTROINTESTINAL: No abdominal or epigastric pain. No nausea, vomiting, or hematemesis; No diarrhea or constipation. No melena or hematochezia.  GENITOURINARY: No dysuria, frequency or hematuria  NEUROLOGICAL: No numbness or weakness  SKIN: No itching, burning, rashes, or lesions   All other review of systems is negative unless indicated above.    VITAL:  T(C): , Max: 36.8 (03-06-24 @ 19:45)  T(F): , Max: 98.3 (03-06-24 @ 19:45)  HR: 71 (03-07-24 @ 05:28)  BP: 174/78 (03-07-24 @ 05:28)  BP(mean): --  RR: 18 (03-07-24 @ 05:28)  SpO2: 96% (03-07-24 @ 05:28)  Wt(kg): --    I and O's:        PHYSICAL EXAM:    Constitutional: NAD  HEENT: PERRLA,   Neck: No JVD  Respiratory: CTA B/L  Cardiovascular: S1 and S2  Gastrointestinal: BS+, soft, NT/ND  Extremities: No peripheral edema  Neurological: A/O x 3, no focal deficits  Psychiatric: Normal mood, normal affect  : No Sousa  Skin: No rashes  Access: Not applicable  Back: No CVA tenderness    LABS:                        11.5   8.73  )-----------( 343      ( 07 Mar 2024 07:33 )             35.3     03-07    143  |  108  |  20  ----------------------------<  87  3.5   |  21<L>  |  1.19    Ca    10.2      07 Mar 2024 07:14  Phos  3.4     03-05  Mg     2.0     03-05    TPro  6.5  /  Alb  3.8  /  TBili  0.3  /  DBili  x   /  AST  15  /  ALT  11  /  AlkPhos  85  03-05          RADIOLOGY & ADDITIONAL STUDIES:                          
Patient is a 84y old  Female who presents with a chief complaint of weakness (06 Mar 2024 15:10)      SUBJECTIVE / OVERNIGHT EVENTS: on ABx for UTI. daughter at bedside ptn has been on OZEMPIC 1 mg/week for several months, had lost 30-40 lbs, ptn was telling her daughter last week she is at her weight before having children. ptn is generally active, goes on trips, shops for herself and drives. since losing her weight she has become weaker, never has an appetite, "starves herself" as per daughter, has been having diarrhea and post prandial abd pain. ptn states she gets dizze when someone moves her bed, denies having HA, always has Nausea    MEDICATIONS  (STANDING):  cefTRIAXone   IVPB      cefTRIAXone   IVPB 1000 milliGRAM(s) IV Intermittent every 24 hours  enoxaparin Injectable 40 milliGRAM(s) SubCutaneous every 24 hours  hydrALAZINE 25 milliGRAM(s) Oral two times a day  lactated ringers. 1000 milliLiter(s) (75 mL/Hr) IV Continuous <Continuous>  metoprolol tartrate 12.5 milliGRAM(s) Oral two times a day  pantoprazole  Injectable 40 milliGRAM(s) IV Push daily    MEDICATIONS  (PRN):  hydrALAZINE Injectable 10 milliGRAM(s) IV Push every 8 hours PRN SBP>169      Vital Signs Last 24 Hrs  T(F): 98.1 (24 @ 12:40), Max: 98.5 (24 @ 01:50)  HR: 90 (24 @ 12:40) (61 - 104)  BP: 147/83 (24 @ 12:40) (136/83 - 206/89)  RR: 18 (24 @ 12:40) (16 - 20)  SpO2: 97% (24 @ 12:40) (96% - 99%)  Telemetry:   CAPILLARY BLOOD GLUCOSE        I&O's Summary      PHYSICAL EXAM:  GENERAL: NAD, well-developed  HEAD:  Atraumatic, Normocephalic  EYES: EOMI, PERRLA, conjunctiva and sclera clear  NECK: Supple, No JVD  CHEST/LUNG: Clear to auscultation bilaterally; No wheeze  HEART: Regular rate and rhythm; No murmurs, rubs, or gallops  ABDOMEN: Soft, Nontender, Nondistended; Bowel sounds present  EXTREMITIES:  2+ Peripheral Pulses, No clubbing, cyanosis, or edema  PSYCH: AAOx3  NEUROLOGY: non-focal  SKIN: No rashes or lesions    LABS:                        11.3   7.40  )-----------( 299      ( 05 Mar 2024 18:43 )             33.1     03-05    144  |  108  |  24<H>  ----------------------------<  94  3.4<L>   |  26  |  1.26    Ca    10.6<H>      05 Mar 2024 18:43  Phos  3.4     03-05  Mg     2.0     03-05    TPro  6.5  /  Alb  3.8  /  TBili  0.3  /  DBili  x   /  AST  15  /  ALT  11  /  AlkPhos  85  03-05    PT/INR - ( 05 Mar 2024 18:43 )   PT: 12.5 sec;   INR: 1.14 ratio         PTT - ( 05 Mar 2024 18:43 )  PTT:30.2 sec      Urinalysis Basic - ( 06 Mar 2024 00:29 )    Color: Yellow / Appearance: Clear / S.016 / pH: x  Gluc: x / Ketone: Trace mg/dL  / Bili: Negative / Urobili: 0.2 mg/dL   Blood: x / Protein: 30 mg/dL / Nitrite: Negative   Leuk Esterase: Moderate / RBC: 1 /HPF / WBC 50 /HPF   Sq Epi: x / Non Sq Epi: 7 /HPF / Bacteria: Negative /HPF        RADIOLOGY & ADDITIONAL TESTS:    Imaging Personally Reviewed:    Consultant(s) Notes Reviewed:      Care Discussed with Consultants/Other Providers:  
Date of Service: 03-08-24 @ 14:49    Patient is a 84y old  Female who presents with a chief complaint of dyspepsia (08 Mar 2024 08:16)      Any change in ROS: Pulm wise good:  on room air:  lying in bed     MEDICATIONS  (STANDING):  cefTRIAXone   IVPB      cefTRIAXone   IVPB 1000 milliGRAM(s) IV Intermittent every 24 hours  cyanocobalamin 1000 MICROGram(s) Oral daily  enoxaparin Injectable 40 milliGRAM(s) SubCutaneous every 24 hours  famotidine Injectable 20 milliGRAM(s) IV Push daily  folic acid 1 milliGRAM(s) Oral daily  hydrALAZINE 25 milliGRAM(s) Oral three times a day  lisinopril 2.5 milliGRAM(s) Oral daily  loperamide 2 milliGRAM(s) Oral two times a day  meclizine 12.5 milliGRAM(s) Oral three times a day  metoprolol succinate ER 25 milliGRAM(s) Oral daily  multivitamin 1 Tablet(s) Oral daily  ondansetron Injectable 4 milliGRAM(s) IV Push once  ondansetron Injectable 4 milliGRAM(s) IV Push every 8 hours  pantoprazole  Injectable 40 milliGRAM(s) IV Push daily  thiamine IVPB 500 milliGRAM(s) IV Intermittent daily    MEDICATIONS  (PRN):  hydrALAZINE Injectable 10 milliGRAM(s) IV Push every 8 hours PRN SBP>169    Vital Signs Last 24 Hrs  T(C): 36.9 (08 Mar 2024 08:39), Max: 36.9 (08 Mar 2024 08:39)  T(F): 98.4 (08 Mar 2024 08:39), Max: 98.4 (08 Mar 2024 08:39)  HR: 67 (08 Mar 2024 08:39) (60 - 75)  BP: 161/84 (08 Mar 2024 08:39) (131/77 - 161/84)  BP(mean): --  RR: 18 (08 Mar 2024 08:39) (17 - 18)  SpO2: 98% (08 Mar 2024 08:39) (97% - 98%)    Parameters below as of 08 Mar 2024 08:39  Patient On (Oxygen Delivery Method): room air        I&O's Summary    07 Mar 2024 07:01  -  08 Mar 2024 07:00  --------------------------------------------------------  IN: 50 mL / OUT: 0 mL / NET: 50 mL          Physical Exam:   GENERAL: NAD, well-groomed, well-developed  HEENT: EMANUEL/   Atraumatic, Normocephalic  ENMT: No tonsillar erythema, exudates, or enlargement; Moist mucous membranes, Good dentition, No lesions  NECK: Supple, No JVD, Normal thyroid  CHEST/LUNG: Clear to auscultaion- no wheezing  CVS: Regular rate and rhythm; No murmurs, rubs, or gallops  GI: : Soft, Nontender, Nondistended; Bowel sounds present  NERVOUS SYSTEM:  Alert & Oriented X3  EXTREMITIES:  2+ Peripheral Pulses, No clubbing, cyanosis, or edema  LYMPH: No lymphadenopathy noted  SKIN: No rashes or lesions  ENDOCRINOLOGY: No Thyromegaly  PSYCH: Appropriate    Labs:    CARDIAC MARKERS ( 07 Mar 2024 07:14 )  x     / x     / 27 U/L / x     / x                                11.5   8.73  )-----------( 343      ( 07 Mar 2024 07:33 )             35.3                         11.3   7.40  )-----------( 299      ( 05 Mar 2024 18:43 )             33.1     03-08    138  |  110<H>  |  9   ----------------------------<  120<H>  3.7   |  18<L>  |  0.34<L>  03-07    143  |  108  |  20  ----------------------------<  87  3.5   |  21<L>  |  1.19  03-05    144  |  108  |  24<H>  ----------------------------<  94  3.4<L>   |  26  |  1.26    Ca    9.1      08 Mar 2024 07:34  Ca    10.2      07 Mar 2024 07:14    TPro  6.5  /  Alb  3.8  /  TBili  0.3  /  DBili  x   /  AST  15  /  ALT  11  /  AlkPhos  85  03-05    CAPILLARY BLOOD GLUCOSE              Urinalysis Basic - ( 08 Mar 2024 07:34 )    Color: x / Appearance: x / SG: x / pH: x  Gluc: 120 mg/dL / Ketone: x  / Bili: x / Urobili: x   Blood: x / Protein: x / Nitrite: x   Leuk Esterase: x / RBC: x / WBC x   Sq Epi: x / Non Sq Epi: x / Bacteria: x            RECENT CULTURES:  03-06 @ 00:29 Clean Catch Clean Catch (Midstream)     rad< from: CT Head No Cont (03.07.24 @ 21:21) >  contrast. Sagittal and coronal reconstructed images were acquired from   the source data.    COMPARISON: Prior CT study of the head dated 6/10/2023. Prior brain MRI   study from 6/13/2023.    FINDINGS: An area of hyperattenuation is seen within the rightbasal   ganglia which is new in comparison to the prior studies.    There is no acute intracranial hemorrhage, mass effect, shift of the   midline structures, herniation, stenosis, abnormal extra-axial fluid   collection.    There is diffuse cerebralvolume loss with prominence of the sulci,   fissures, and cisternal spaces which is normal for the patient's age.   There is mild periventricular white matter hypoattenuation statistically   compatible with microvascular type changes given calcific atherosclerotic   disease of the intracranial arteries.    The paranasal sinuses and tympanomastoid cavities are grossly clear. The   calvarium appears intact. There is evidence of bilateral cataract removal.    IMPRESSION: Age indeterminate ischemia within the right basal ganglia.   Correlation with an MRI study can be obtained, provided there are no MRI   contraindications.    No acute intracranial hemorrhage.    Similar-appearing mild chronic white matter microvascular type changes.    --- End of Report ---            FELIPA SPRAGUE MD; Attending Radiologist  This document has been electronically signed. Mar  7 2024  9:26PM    < end of copied text >  < from: [facility] Duplex Carotid, Bilat (03.06.24 @ 10:51) >  PROX ICA = 56  DIST ICA = 37  ECA = 83    LEFT:  PROX CCA = 104  DIST CCA = 50  PROX ICA = 78  DIST ICA = 44  ECA = 109    Antegrade flow is noted within both vertebral arteries.    IMPRESSION: No significant hemodynamic stenosis of either carotid artery.    Measurement of carotid stenosis is based on velocity parameters that   correlate the residual internal carotid diameter with that of the more   distal vessel in accordance with a method such as the [place] American   Symptomatic Carotid Endarterectomy Trial (NASCET).    --- End of Report ---            ANTHONY HALEY MD; Attending Interventional Radiologist  This document has been electronically signed. Mar  6 2024 11:36AM    < end of copied text >  < from: CT Chest No Cont (03.05.24 @ 22:35) >  Limited evaluation of liver, spleen, pancreas, adrenal glands, and upper   pole of left kidney is unremarkable.    Bone and Soft Tissue: There is no evidence of osteosclerotic or   osteolytic lesions. There are multilevel degenerative changes of thoracic   spine , with disk space narrowing and osteophytosis. The soft tissue is   grossly normal.    IMPRESSION:  Few sub-4 mm micronodules in bilateral lungs. According to Fleischner   Society guideline for management of incidental pulmonary nodules,   optional follow-up CT chest in 12 months is recommended in a high risk   patient.    --- End of Report ---            MAHAD PRAJAPATI MD; Attending Radiologist  This document has been electronically signed. Mar  6 2024  9:04AM    < end of copied text >             <10,000 CFU/mL Normal Urogenital Willa          RESPIRATORY CULTURES:          Studies  Chest X-RAY  CT SCAN Chest   Venous Dopplers: LE:   CT Abdomen  Others              
Patient is a 84y Female     Patient is a 84y old  Female who presents with a chief complaint of Weakness     (07 Mar 2024 10:00)      HPI:  84-year-old female with past medical history of diabetes, hypertension, rheumatoid arthritis, GERD presenting to emergency department brought in by EMS for generalized weakness.   Patient was seen in the emergency department 4 days prior for abdominal discomfort and diarrhea, was diagnosed with a urinary tract infection and discharged home on oral antibiotics.  Patient states the antibiotics are giving her an upset stomach so only took 1 day of antibiotics before stopping.  Patient accompanied by daughter who states patient has not been eating for the past week.    Patient states food does not taste good, has no desire to eat.  Daughter states she has had increasing weakness, today was unable to get up out of chair unassisted.  Daughter takes care of patient at home without any support.  Daughter feels unsafe taking care of mother at this time.  Denies fever, headache, vision change, chest pain, shortness of breath, abdominal pain, nausea, vomiting, rash.   (05 Mar 2024 19:47)      PAST MEDICAL & SURGICAL HISTORY:  Sjoegren syndrome      Rheumatoid arthritis      Osteopenia      Biliary cirrhosis      Nephrolithiasis      Type 2 diabetes mellitus      Hypothyroid      Status post laminectomy      Cataract  left eye cataract surgery 3/3/2014          MEDICATIONS  (STANDING):  cefTRIAXone   IVPB      cefTRIAXone   IVPB 1000 milliGRAM(s) IV Intermittent every 24 hours  cyanocobalamin 1000 MICROGram(s) Oral daily  enoxaparin Injectable 40 milliGRAM(s) SubCutaneous every 24 hours  famotidine Injectable 20 milliGRAM(s) IV Push daily  folic acid 1 milliGRAM(s) Oral daily  hydrALAZINE 25 milliGRAM(s) Oral three times a day  lactated ringers. 1000 milliLiter(s) (75 mL/Hr) IV Continuous <Continuous>  lisinopril 2.5 milliGRAM(s) Oral daily  loperamide 2 milliGRAM(s) Oral two times a day  meclizine 12.5 milliGRAM(s) Oral three times a day  metoprolol succinate ER 25 milliGRAM(s) Oral daily  multivitamin 1 Tablet(s) Oral daily  ondansetron Injectable 4 milliGRAM(s) IV Push once  ondansetron Injectable 4 milliGRAM(s) IV Push every 8 hours  pantoprazole  Injectable 40 milliGRAM(s) IV Push daily  thiamine IVPB 500 milliGRAM(s) IV Intermittent daily      Allergies    Remicade (Anaphylaxis)    Intolerances        SOCIAL HISTORY:  Denies ETOh,Smoking,     FAMILY HISTORY:      REVIEW OF SYSTEMS:    CONSTITUTIONAL: No weakness, fevers or chills  EYES/ENT: No visual changes;  No vertigo or throat pain   NECK: No pain or stiffness  RESPIRATORY: No cough, wheezing, hemoptysis; No shortness of breath  CARDIOVASCULAR: No chest pain or palpitations  GASTROINTESTINAL: No abdominal or epigastric pain. No nausea, vomiting, or hematemesis; No diarrhea or constipation. No melena or hematochezia.  GENITOURINARY: No dysuria, frequency or hematuria  NEUROLOGICAL: No numbness or weakness  SKIN: No itching, burning, rashes, or lesions   All other review of systems is negative unless indicated above.    VITAL:  T(C): , Max: 36.8 (03-07-24 @ 16:14)  T(F): , Max: 98.3 (03-07-24 @ 16:14)  HR: 60 (03-08-24 @ 00:03)  BP: 131/77 (03-08-24 @ 00:03)  BP(mean): --  RR: 18 (03-08-24 @ 00:03)  SpO2: 97% (03-08-24 @ 00:03)  Wt(kg): --    I and O's:    03-07 @ 07:01  -  03-08 @ 07:00  --------------------------------------------------------  IN: 50 mL / OUT: 0 mL / NET: 50 mL          PHYSICAL EXAM:    Constitutional: NAD  HEENT: PERRLA,   Neck: No JVD  Respiratory: CTA B/L  Cardiovascular: S1 and S2  Gastrointestinal: BS+, soft, NT/ND  Extremities: No peripheral edema  Neurological: A/O x 3, no focal deficits  Psychiatric: Normal mood, normal affect  : No Sousa  Skin: No rashes  Access: Not applicable  Back: No CVA tenderness    LABS:                        11.5   8.73  )-----------( 343      ( 07 Mar 2024 07:33 )             35.3     03-07    143  |  108  |  20  ----------------------------<  87  3.5   |  21<L>  |  1.19    Ca    10.2      07 Mar 2024 07:14            RADIOLOGY & ADDITIONAL STUDIES:

## 2024-03-08 NOTE — DISCHARGE NOTE PROVIDER - NSDCCPCAREPLAN_GEN_ALL_CORE_FT
PRINCIPAL DISCHARGE DIAGNOSIS  Diagnosis: Weakness  Assessment and Plan of Treatment: CT A/P ->nonobstructing 1cm R renal stone , no evidence of PNA.   GI also following as pt complained of diarrhea-> now improved, agreed with imodium - stool studies were negative outpt.   TSH was also  elevated  at 5.87, HA1C is below 6, VB12 643, Folate 9.9, Carotids and TTE benign. Pt is malnourished. Will place on VB12 and Folate, supplements.  Should not cont using OZEMPIC.  Now improving      SECONDARY DISCHARGE DIAGNOSES  Diagnosis: Benign essential HTN  Assessment and Plan of Treatment: You had high blood pressure during this admission. Adjustments made to your regimen. Will change lopressor to TOPROL 25 and  increase Hydralazine to 25  3x/day  and add Lisinopril 2.5 daily    Diagnosis: Pulmonary nodules  Assessment and Plan of Treatment: Pulm consulted -> According to Fleischner Society guideline for management of incidental pulmonary nodules, optional follow-up CT chest in 12 months is recommended in a high risk patient.    Diagnosis: Acute UTI  Assessment and Plan of Treatment: You were treated with IV antibitoics.   HOME CARE INSTRUCTIONS  f you were prescribed antibiotics, take them exactly as your caregiver instructs you. Finish the medication even if you feel better after you have only taken some of the medication.  Drink enough water and fluids to keep your urine clear or pale yellow.  Avoid caffeine, tea, and carbonated beverages. They tend to irritate your bladder.  Empty your bladder often. Avoid holding urine for long periods of time.  Empty your bladder before and after sexual intercourse.  After a bowel movement, women should cleanse from front to back. Use each tissue only once.  SEEK MEDICAL CARE IF:  You have back pain.  You develop a fever.  Your symptoms do not begin to resolve within 3 days.  SEEK IMMEDIATE MEDICAL CARE IF:  You have severe back pain or lower abdominal pain.  You develop chills.  You have nausea or vomiting.  You have continued burning or discomfort with urination.

## 2024-03-08 NOTE — PHYSICAL THERAPY INITIAL EVALUATION ADULT - GENERAL OBSERVATIONS, REHAB EVAL
pt received supine on stretcher in ED, +IV, A&0x4, following 100% multi-step commands
pt received supine on stretcher in ED, +IV, A&0x4, following 100% multi-step commands

## 2024-03-08 NOTE — DISCHARGE NOTE NURSING/CASE MANAGEMENT/SOCIAL WORK - NSCORESITESY/N_GEN_A_CORE_RD
You have elected to proceed with Surgery for open reduction and internal fixation of the fifth toe fracture on the left.  Surgeries are performed on Tuesdays at [facility].   To schedule your surgery date please call [phone].  Please leave a message with a good time for our staff to call you back.    - Please have a date in mind for your surgery, you can feel free to leave that date on the message, and we will schedule and call back to confirm.     You can expect receive a call back the same day or on the next business day from Dr. Funes s team to assist in the scheduling.   - We will schedule the date of your surgery.  The time will be determined a few days ahead of time.  You can expect a call from Same Day Surgery 2-3 days ahead of time with specific instructions for what time to arrive at the hospital as well as any other preparations you should take prior to surgery.    - You may need to obtain a pre-operative physical from your primary medical provider. This must be done within 30 days of your surgery date.    - We will also schedule your first post-operative appointment for a bandage and wound check for the Monday following your surgery at the [place] location.    - You may be non-weight bearing for a period of up to 6 weeks.  Options for this include: (Please indicate which you would prefer so we can provide you with an order and instructions)  o Crutches  o Walker  o Roll-a-bout knee walker.    - If you will need paperwork filled out for your employer you may drop those off at the clinic directly or you may have those faxed to us at [phone].  Please indicate on the form the date you would like the LA to begin if it will not be your surgery date.    The forms are typically filled out for up to 12 weeks, however you may be cleared to return prior to that time depending on your individual healing and job requirements.    GETTING READY FOR YOUR SURGERY  ONE-THREE WEEKS BEFORE  1.  No See your Family Doctor or Primary Care Provider for a Preoperative History and Physical.    2. Please see pre-surgical medications below to which medications need to be stopped before surgery and when.    SAME DAY SURGERY PATIENTS  1. You will need a family member of friend to drive you home. If you do not have one the surgery will be cancelled or rescheduled.  2. You will need a responsible adult to stay with you that night after the surgery.       We will ask this person to listen to some instructions before you leave the hospital.    DAY BEFORE SURGERY  1. DO NOT EAT OR DRINK ANYTHING AFTER MIDNIGHT THE NIGHT  BEFORE YOUR SURGERY!   2. DO NOT DRINK ALCOHOL.  3. Do not take over the counter drugs.  4. Some people need to have blood tests at the hospital. If you need blood tests, we will tell you in advance.  5. Take medications as directed by your doctor. You may take these with a small amount of water.  6. Do not chew gum, chew tobacco, or suck on hard candy the day of surgery.  7. Bring your insurance cards, a list of your medicines and co-pays you might need. Leave jewelry and other valuables at home.      PRESURGICAL MEDICATIONS:  Certain prescription, over-the-counter, and herbal medications interfere with healing after an operation. The main concern relates to medications that increase bleeding at the surgical site. Excess blood under the incision results in poor wound healing, excess pain, increased scarring, and a higher risk of infection.    Some medications slow the healing process of bone. Medications can also interfere with the anesthesia drugs that keep you asleep during the operation. It is important to ensure that these medications are out of your system prior to the operation. The list below details a number of medications that are of concern. Pay special attention to how long you should avoid these medications before your operation. Please note that this list is not complete. You should ask your  surgeon or pharmacist if you are uncertain about other medications. Any herbal supplement not listed should be discontinued at least one week prior to surgery.    Aspirin: Hold for one week prior to surgery and restart the day after surgery. This over the counter medication promotes bleeding.    Motrin / Ibuprofen / Aleve / Advil / NSAIDS:  Stop one week prior to surgery. These medications affect bleeding and may cause delay in bone healing. Avoid taking these medications for six weeks after bone surgeries. Other procedures may allow you to restart sooner than 6 weeks after surgery.    Coumadin / Plavix: Your primary care provider will manage Coumadin in relation to surgery. Coumadin may result in excessive bleeding and may be adjusted before and after surgery.    Enbriel: Stop two weeks prior to surgery and restart two weeks after surgery. This medication can effect soft tissue healing and increases the risk of infection.    Remicade: Stop 8-12 weeks before surgery and restart two weeks after surgery. This medication can affect soft tissue healing and increases the risk of infection.    Humira: Stop 4 weeks before surgery and restart two weeks after surgery. This medication can affect soft tissue healing and increases the risk of infection.    Methotrexate: Stop one dose prior to surgery. This medication will be restarted when the wound appears to be healing well. Please ask your surgeon about restarting this medication when you are being seen in the office for wound checks.    Kava: Stop at least one day prior to surgery and may restart one day after surgery. Kava may increase the sedative effect of anesthetics that are given during the operation. Kava can also increase bleeding at the surgical site.    Ephedra (eren courtney): Stop at least one day prior to surgery and may restart one day after surgery.  Ephedra may increase the risk of heart attack and stroke. This medication can also increase bleeding at the  surgical site.    [place]'s Wort: Stop at least five days before surgery and may restart one day after surgery. Chika's wort may diminish the effects of several drugs that are given during surgery.    Ginseng: Stop at least one week prior to surgery and may restart one day after surgery.  Ginseng lowers blood sugar and may increase bleeding at the surgical site.    Ginkgo: Stop 36 hours before surgery and may restart one day after surgery. Ginkgo may increase bleeding at the surgical site.    Garlic: Stop at least one week prior to surgery and may restart one day after. Garlic may increase bleeding at the surgery site.    Valerian: Do a slow steady decrease in your daily dose over a period of 2-3 weeks before surgery to decrease the chance of withdrawal symptoms. Valerian may increase the sedative effect of anesthetics given during the operation.    Echinacea: There is no data on stopping echinacea prior to surgery. This medication though can be associated with allergic reactions and suppression of your immune system.    Vitamin E, Omega-3, Flax, Fish Oil, Glucosamine and Chondroitin: Stop 2 weeks prior to surgery and may restart one day after. These herbal medications can increase risk of bleeding at surgical site.

## 2024-03-08 NOTE — DISCHARGE NOTE PROVIDER - DETAILS OF MALNUTRITION DIAGNOSIS/DIAGNOSES
This patient has been assessed with a concern for Malnutrition and was treated during this hospitalization for the following Nutrition diagnosis/diagnoses:     -  03/07/2024: Severe protein-calorie malnutrition

## 2024-03-08 NOTE — DISCHARGE NOTE PROVIDER - CARE PROVIDER_API CALL
Ricci Diaz  Cardiovascular Disease  [address] [facility], [address]  [place] [address]  Phone: [phone]  Fax: [phone]  Follow Up Time:     Kong Serrano  Gastroenterology  [address] [facility], [address]  [place] [address]  Phone: [phone]  Fax: [phone]  Follow Up Time:     Paul Bernard  Pulmonary Disease  [address] [place] [id number]  Phone: [phone]  Fax: [phone]  Follow Up Time:

## 2024-03-08 NOTE — PROGRESS NOTE ADULT - PROVIDER SPECIALTY LIST ADULT
Pulmonology
Pulmonology
Gastroenterology
Internal Medicine
Infectious Disease
Infectious Disease
Internal Medicine
Gastroenterology
Internal Medicine

## 2024-03-08 NOTE — DISCHARGE NOTE PROVIDER - HOSPITAL COURSE
HPI:  84-year-old female with past medical history of diabetes, hypertension, rheumatoid arthritis, GERD presenting to emergency department brought in by EMS for generalized weakness.   Patient was seen in the emergency department 4 days prior for abdominal discomfort and diarrhea, was diagnosed with a urinary tract infection and discharged home on oral antibiotics.  Patient states the antibiotics are giving her an upset stomach so only took 1 day of antibiotics before stopping.  Patient accompanied by daughter who states patient has not been eating for the past week.    Patient states food does not taste good, has no desire to eat.  Daughter states she has had increasing weakness, today was unable to get up out of chair unassisted.  Daughter takes care of patient at home without any support.  Daughter feels unsafe taking care of mother at this time.  Denies fever, headache, vision change, chest pain, shortness of breath, abdominal pain, nausea, vomiting, rash.   (05 Mar 2024 19:47)    Hospital Course: pt brought in by EMS for generalized weakness. Pt was recently on abx of UTI Ucx negative --> likely d/t recent antibiotics. CT A/P ->nonobstructing 1cm R renal stone , no evidence of PNA on CT. ID rec-> c/w ceftriaxone to complete total 3d course today 3/7/24. GI also following as pt c/o diarrhea-> now improved, agreed with imodium - stool studies were negative outpt . Of note on CT chest -> also: Few sub-4 mm micronodules in bilateral lungs. Pulm consulted -> According to Fleischner Society guideline for management of incidental pulmonary nodules, optional follow-up CT chest in 12 months is recommended in a high risk patient. Further workup performed to evaluate weakness -> TSH was also  elevated  at 5.87, HA1C is below 6, VB12 643, Folate 9.9, Carotids and TTE benign. Pt is malnourished. Will place on VB12 and Folate, supplements.  Ptn should not cont using OZEMPIC. BP is elevated.  Will change lopressor to TOPROL 25 and  increase Hydralazine to 25 tid and add Lisinopril 2.5 daily. Pt is now medically stable for discharge home.         Important Medication Changes and Reason:    Active or Pending Issues Requiring Follow-up:    Advanced Directives:   [ x] Full code  [ ] DNR  [ ] Hospice    Discharge Diagnoses:  Generalized weakness  UTI   diabetes  hypertension  Rheumatoid arthritis  GERD            HPI:  84-year-old female with past medical history of diabetes, hypertension, rheumatoid arthritis, GERD presenting to emergency department brought in by EMS for generalized weakness.   Patient was seen in the emergency department 4 days prior for abdominal discomfort and diarrhea, was diagnosed with a urinary tract infection and discharged home on oral antibiotics.  Patient states the antibiotics are giving her an upset stomach so only took 1 day of antibiotics before stopping.  Patient accompanied by daughter who states patient has not been eating for the past week.    Patient states food does not taste good, has no desire to eat.  Daughter states she has had increasing weakness, today was unable to get up out of chair unassisted.  Daughter takes care of patient at home without any support.  Daughter feels unsafe taking care of mother at this time.  Denies fever, headache, vision change, chest pain, shortness of breath, abdominal pain, nausea, vomiting, rash.   (05 Mar 2024 19:47)    Hospital Course: pt brought in by EMS for generalized weakness. Pt was recently on abx of UTI Ucx negative --> likely d/t recent antibiotics. CT A/P ->nonobstructing 1cm R renal stone , no evidence of PNA on CT. ID rec-> c/w ceftriaxone to complete total 3d course today 3/7/24. GI also following as pt c/o diarrhea-> now improved, agreed with imodium - stool studies were negative outpt . Of note on CT chest -> also: Few sub-4 mm micronodules in bilateral lungs. Pulm consulted -> According to Fleischner Society guideline for management of incidental pulmonary nodules, optional follow-up CT chest in 12 months is recommended in a high risk patient. Further workup performed to evaluate weakness -> TSH was also  elevated  at 5.87, HA1C is below 6, VB12 643, Folate 9.9, Carotids and TTE benign. Pt is malnourished. Will place on VB12 and Folate, supplements.  Ptn should not cont using OZEMPIC. BP is elevated.  Will change lopressor to TOPROL 25 and  increase Hydralazine to 25 tid and add Lisinopril 2.5 daily. Pt is now medically stable for discharge home.     Important Medication Changes and Reason:    Active or Pending Issues Requiring Follow-up:    Advanced Directives:   [ x] Full code  [ ] DNR  [ ] Hospice    Discharge Diagnoses:  Generalized weakness  UTI   diabetes  hypertension  Rheumatoid arthritis  GERD

## 2024-03-08 NOTE — PROGRESS NOTE ADULT - NUTRITIONAL ASSESSMENT
This patient has been assessed with a concern for Malnutrition and has been determined to have a diagnosis/diagnoses of Severe protein-calorie malnutrition.    This patient is being managed with:   Diet Consistent Carbohydrate/No Snacks-  DASH/TLC {Sodium & Cholesterol Restricted} (DASH)  Entered: Mar  7 2024 12:31PM    The following pending diet order is being considered for treatment of Severe protein-calorie malnutrition:  Diet Regular-  Supplement Feeding Modality:  Oral  Ensure Plus High Protein Cans or Servings Per Day:  3       Frequency:  Daily  Entered: Mar  8 2024  8:12AM  
This patient has been assessed with a concern for Malnutrition and has been determined to have a diagnosis/diagnoses of Severe protein-calorie malnutrition.    This patient is being managed with:   Diet Consistent Carbohydrate/No Snacks-  DASH/TLC {Sodium & Cholesterol Restricted} (DASH)  Entered: Mar  7 2024 12:31PM  
This patient has been assessed with a concern for Malnutrition and has been determined to have a diagnosis/diagnoses of Severe protein-calorie malnutrition.    This patient is being managed with:   Diet Regular-  Supplement Feeding Modality:  Oral  Ensure Plus High Protein Cans or Servings Per Day:  3       Frequency:  Daily  Entered: Mar  8 2024  8:12AM

## 2024-03-08 NOTE — DISCHARGE NOTE PROVIDER - PROVIDER TOKENS
PROVIDER:[TOKEN:[[id number]]],PROVIDER:[TOKEN:[[id number]]],PROVIDER:[TOKEN:[[id number]]]

## 2024-03-08 NOTE — DISCHARGE NOTE NURSING/CASE MANAGEMENT/SOCIAL WORK - NSDCPEFALRISK_GEN_ALL_CORE
For information on Fall & Injury Prevention, visit: [email or web address] OR  [email or web address] OR  https://www.cdc.gov/steadi/patient.html

## 2024-03-08 NOTE — PHYSICAL THERAPY INITIAL EVALUATION ADULT - NSPTDISCHREC_GEN_A_CORE
No skilled PT needs
pt requires no skilled PT at this time. pt is functionally independent./No skilled PT needs

## 2024-03-08 NOTE — PHYSICAL THERAPY INITIAL EVALUATION ADULT - PERTINENT HX OF CURRENT PROBLEM, REHAB EVAL
Pt is a 85yo F PMHx DM, HTN, RA, GERD p/w generalized weakness. Patient was seen in the emergency department 4 days prior for abdominal discomfort and diarrhea, was diagnosed with a urinary tract infection and discharged home on oral antibiotics.  Patient states the antibiotics are giving her an upset stomach so only took 1 day of antibiotics before stopping.   CXR: Clear lungs.
Pt is a 83yo F PMHx DM, HTN, RA, GERD p/w generalized weakness. Patient was seen in the emergency department 4 days prior for abdominal discomfort and diarrhea, was diagnosed with a urinary tract infection and discharged home on oral antibiotics.  Patient states the antibiotics are giving her an upset stomach so only took 1 day of antibiotics before stopping.   CXR: Clear lungs.

## 2024-03-08 NOTE — DISCHARGE NOTE PROVIDER - CARE PROVIDERS DIRECT ADDRESSES
[email or web address]

## 2024-03-08 NOTE — PROGRESS NOTE ADULT - ASSESSMENT
84-year-old female with past medical history of diabetes, hypertension, rheumatoid arthritis, GERD presenting to emergency department brought in by EMS for generalized weakness.    UTI, generalized weakness  pt reports urgency  Ucx negative -- likely d/t recent antibiotics  nonobstructing 1cm R renal stone on CTAP  GI following  no evidence of PNA on CT  no evidence of SSTI on exam    Recommendations  c/w ceftriaxone to complete total 3d course today 3/7/24  Continue rest of care per primary team       Won Paredes M.D.  [facility], Division of Infectious Diseases  [phone]  After 5pm on weekdays and all day on weekends - please call [phone]  
84-year-old female with past medical history of diabetes, hypertension, rheumatoid arthritis, GERD presenting to emergency department brought in by EMS for generalized weakness.   Patient was seen in the emergency department 4 days prior for abdominal discomfort and diarrhea, was diagnosed with a urinary tract infection and discharged home on oral antibiotics.  Patient states the antibiotics are giving her an upset stomach so only took 1 day of antibiotics before stopping.  Patient accompanied by daughter who states patient has not been eating for the past week.    Patient states food does not taste good, has no desire to eat.  Daughter states she has had increasing weakness, today was unable to get up out of chair unassisted.  Daughter takes care of patient at home without any support.  Daughter feels unsafe taking care of mother at this time.  Denies fever, headache, vision change, chest pain, shortness of breath, abdominal pain, nausea, vomiting, rash.    3/5: pan culture, start CTX, ID called, RVP neg, PT eval, DVT ppx w sc Lovenox    3/6: on ABx for UTI. daughter at bedside ptn has been on OZEMPIC 1 mg/week for several months, had lost 30-40 lbs, ptn was telling her daughter last week she is at her weight before having children. ptn is generally active, goes on trips, shops for herself and drives. since losing her weight she has become weaker, never has an appetite, "starves herself" as per daughter, has been having diarrhea and post prandial abd pain. ptn states she gets dizze when someone moves her bed, denies having HA, always has Nausea. GI notified: Dr. Serrano. will cont hydration w IVF, supportive care w loperamide once GI PCR is neg, ZOfran prn, meclizine prn, will get Head ct, IV PPI, IV pepcid  
84-year-old female with past medical history of diabetes, hypertension, rheumatoid arthritis, GERD presenting to emergency department brought in by EMS for generalized weakness.    UTI, generalized weakness  pt reports urgency-no gu sx now   Ucx negative -- likely d/t recent antibiotics  nonobstructing 1cm R renal stone on CTAP  GI following  no evidence of PNA on CT  no evidence of SSTI on exam    Recommendations  S/p ceftriaxone -completed total 3d course 3/7/24  Continue off antibiotics   Continue rest of care per primary team   Stable from ID standpoint at this time.      Won Paredes M.D.  [facility], Division of Infectious Diseases  [phone]  After 5pm on weekdays and all day on weekends - please call [phone]  
84-year-old female with past medical history of diabetes, hypertension, rheumatoid arthritis, GERD presenting to emergency department brought in by EMS for generalized weakness.   Patient was seen in the emergency department 4 days prior for abdominal discomfort and diarrhea, was diagnosed with a urinary tract infection and discharged home on oral antibiotics.  Patient states the antibiotics are giving her an upset stomach so only took 1 day of antibiotics before stopping.  Patient accompanied by daughter who states patient has not been eating for the past week.    Patient states food does not taste good, has no desire to eat.  Daughter states she has had increasing weakness, today was unable to get up out of chair unassisted.  Daughter takes care of patient at home without any support.  Daughter feels unsafe taking care of mother at this time.  Denies fever, headache, vision change, chest pain, shortness of breath, abdominal pain, nausea, vomiting, rash.    3/5: pan culture, start CTX, ID called, RVP neg, PT eval, DVT ppx w sc Lovenox    3/6: on ABx for UTI. daughter at bedside ptn has been on OZEMPIC 1 mg/week for several months, had lost 30-40 lbs, ptn was telling her daughter last week she is at her weight before having children. ptn is generally active, goes on trips, shops for herself and drives. since losing her weight she has become weaker, never has an appetite, "starves herself" as per daughter, has been having diarrhea and post prandial abd pain. ptn states she gets dizze when someone moves her bed, denies having HA, always has Nausea. GI notified: Dr. Serrano. will cont hydration w IVF, supportive care w loperamide once GI PCR is neg, ZOfran prn, meclizine prn, will get Head ct, IV PPI, IV pepcid  3/7: TSH is elev at 5.87, HA1C is below 6, VB12 643, Folate 9.9, UCx neg, Carotids and TTE benign. outptn GI PCR is neg, no diarrhea since admission, no vomiting ptn is lethargic, she is malnourished. will place on VB12 and Folate, supplements, ptn should not cont using OZEMPIC. BP is elevated, orthostatics ordered yesterday AND today. not yet available. will change lopressor to TOPROL 25, raise Hydralazine to 25 tid and add Lisinopril 2.5 daily. ptn has h/o DM. DC planning. PT eval pending. case d/w ACp and CM   3/8: no nausea, no dizziness, no diarrhea, no back pain , no c/o pain, walking independently, has no PT needs, dc home today, will need a F/U w PMD in less than a week. daughter understands  
84-year-old female with past medical history of diabetes, hypertension, rheumatoid arthritis, GERD presenting to emergency department brought in by EMS for generalized weakness. Patient was seen in the emergency department 4 days prior for abdominal discomfort and diarrhea, was diagnosed with a urinary tract infection and discharged home on oral antibiotics.  Patient states the antibiotics are giving her an upset stomach so only took 1 day of antibiotics before stopping.  Patient accompanied by daughter who states patient has not been eating for the past week.  Patient states food does not taste good, has no desire to eat.  Daughter states she has had increasing weakness, today was unable to get up out of chair unassisted.  Daughter takes care of patient at home without any support.  Daughter feels unsafe taking care of mother at this time.  Denies fever, headache, vision change, chest pain, shortness of breath, abdominal pain, nausea, vomiting, rash.   (05 Mar 2024 19:47)  to me pt says she came in here with abd pain and UTI:  she has no underlying lung disease:  she had no fever at home  , no cough or phlegm or chest pain : now found to have pulm nodules and hence pulm called:  she has no hx of cancer too:      Multiple pulm nodules:   WEAKNESS/UTI  DN  HTN  RA/ SJOGREN'S SYNDROME    Multiple pulm nodules:   -on current ct chest : Few sub-4 mm micronodules in bilateral lungs. According to Fleischner Society guideline for management of incidental pulmonary nodules, optional follow-up CT chest in 12 months is recommended in a high risk patient.  -ct chest from 2018  : showed Small bilateral pulmonary nodules as described 2-3 mm  , all stable since 2015.  -surprisingly though ct abd showed on current admission ; 1.  New LEFT lower lobe 1 cm pulmonary nodule. Single nodule larger than 8mm should be followed by chest CT, PET/CT or tissue sampling at 3 months. this was not seen on subsequent ct scan chest   -needs outpt follow up with pulm   3/7: dw pt in detail in detail : there may be some  minor increase in her pulm nodules:  needs to be follwed up as an outpt with a pulmonologist   3/8: she should c ome out of bed to chair:  remains on room air:  no pulm complaints at this time  WEAKNESS/UTI  -started on ceftriaone:  do panculture   3/7: on ceftriaxone  to cont   3/8: antibiotics per ID   DM  -monitor and control   HTN  -controlled  RA/ SJOGREN'S SYNDROME  -per PMD:    MRI brain : IMPRESSION: Age indeterminate ischemia within the right basal ganglia. Correlation with an MRI study can be obtained, provided there are no MRI contraindications. No acute intracranial hemorrhage. Similar-appearing mild chronic white matter microvascular type changes  - defer to primary team     dvt prophylaxis    dw team
84-year-old female with past medical history of diabetes, hypertension, rheumatoid arthritis, GERD presenting to emergency department brought in by EMS for generalized weakness. Patient was seen in the emergency department 4 days prior for abdominal discomfort and diarrhea, was diagnosed with a urinary tract infection and discharged home on oral antibiotics.  Patient states the antibiotics are giving her an upset stomach so only took 1 day of antibiotics before stopping.  Patient accompanied by daughter who states patient has not been eating for the past week.  Patient states food does not taste good, has no desire to eat.  Daughter states she has had increasing weakness, today was unable to get up out of chair unassisted.  Daughter takes care of patient at home without any support.  Daughter feels unsafe taking care of mother at this time.  Denies fever, headache, vision change, chest pain, shortness of breath, abdominal pain, nausea, vomiting, rash.   (05 Mar 2024 19:47)  to me pt says she came in here with abd pain and UTI:  she has no underlying lung disease:  she had no fever at home  , no cough or phlegm or chest pain : now found to have pulm nodules and hence pulm called:  she has no hx of cancer too:      Multiple pulm nodules:   WEAKNESS/UTI  DN  HTN  RA/ SJOGREN'S SYNDROME    Multiple pulm nodules:   -on current ct chest : Few sub-4 mm micronodules in bilateral lungs. According to Fleischner Society guideline for management of incidental pulmonary nodules, optional follow-up CT chest in 12 months is recommended in a high risk patient.  -ct chest from 2018  : showed Small bilateral pulmonary nodules as described 2-3 mm  , all stable since 2015.  -surprisingly though ct abd showed on current admission ; 1.  New LEFT lower lobe 1 cm pulmonary nodule. Single nodule larger than 8mm should be followed by chest CT, PET/CT or tissue sampling at 3 months. this was not seen on subsequent ct scan chest   -needs outpt follow up with pulm   3/7: dw pt in detail in detail : there may be some  minor increase in her pulm nodules:  needs to be follwed up as an outpt   WEAKNESS/UTI  -started on ceftriaone:  do panculture   3/7: on ceftraione:  to cont   DM  -monitor and control   HTN  -controlled  RA/ SJOGREN'S SYNDROME  -per PMD:     dvt prophylaxis    dw team
pt had diarrhea as op  feels week and tired today  no diarrhea removed  agree with imodium  stool studies op negative  await ct results
84-year-old female with past medical history of diabetes, hypertension, rheumatoid arthritis, GERD presenting to emergency department brought in by EMS for generalized weakness.   Patient was seen in the emergency department 4 days prior for abdominal discomfort and diarrhea, was diagnosed with a urinary tract infection and discharged home on oral antibiotics.  Patient states the antibiotics are giving her an upset stomach so only took 1 day of antibiotics before stopping.  Patient accompanied by daughter who states patient has not been eating for the past week.    Patient states food does not taste good, has no desire to eat.  Daughter states she has had increasing weakness, today was unable to get up out of chair unassisted.  Daughter takes care of patient at home without any support.  Daughter feels unsafe taking care of mother at this time.  Denies fever, headache, vision change, chest pain, shortness of breath, abdominal pain, nausea, vomiting, rash.    3/5: pan culture, start CTX, ID called, RVP neg, PT eval, DVT ppx w sc Lovenox    3/6: on ABx for UTI. daughter at bedside ptn has been on OZEMPIC 1 mg/week for several months, had lost 30-40 lbs, ptn was telling her daughter last week she is at her weight before having children. ptn is generally active, goes on trips, shops for herself and drives. since losing her weight she has become weaker, never has an appetite, "starves herself" as per daughter, has been having diarrhea and post prandial abd pain. ptn states she gets dizze when someone moves her bed, denies having HA, always has Nausea. GI notified: Dr. Serrano. will cont hydration w IVF, supportive care w loperamide once GI PCR is neg, ZOfran prn, meclizine prn, will get Head ct, IV PPI, IV pepcid  3/7: TSH is elev at 5.87, HA1C is below 6, VB12 643, Folate 9.9, UCx neg, Carotids and TTE benign. outptn GI PCR is neg, no diarrhea since admission, no vomiting ptn is lethargic, she is malnourished. will place on VB12 and Folate, supplements, ptn should not cont using OZEMPIC. BP is elevated, orthostatics ordered yesterday AND today. not yet available. will change lopressor to TOPROL 25, raise Hydralazine to 25 tid and add Lisinopril 2.5 daily. ptn has h/o DM. DC planning. PT eval pending. case d/w ACp and CM   
spoke to daughter  pt improved  d/c planning from [facility]

## 2024-03-08 NOTE — DISCHARGE NOTE NURSING/CASE MANAGEMENT/SOCIAL WORK - PATIENT PORTAL LINK FT
You can access the FollowMyHealth Patient Portal offered by [facility] by registering at the following website: [email or web address]. By joining [employer]’s FollowMyHealth portal, you will also be able to view your health information using other applications (apps) compatible with our system.

## 2024-03-11 LAB — ZINC SERPL-MCNC: 77 UG/DL — SIGNIFICANT CHANGE UP (ref 44–115)

## 2024-03-28 ENCOUNTER — RX RENEWAL (OUTPATIENT)
Age: 84
End: 2024-03-28

## 2024-04-22 NOTE — PATIENT PROFILE ADULT - FUNCTIONAL ASSESSMENT - BASIC MOBILITY 6.
Detail Level: Detailed
Detail Level: Zone
4-calculated by average/Not able to assess (calculate score using [facility] averaging method)

## 2024-05-10 ENCOUNTER — APPOINTMENT (OUTPATIENT)
Dept: RHEUMATOLOGY | Facility: CLINIC | Age: 84
End: 2024-05-10
Payer: MEDICARE

## 2024-05-10 VITALS
HEART RATE: 84 BPM | WEIGHT: 135 LBS | RESPIRATION RATE: 16 BRPM | DIASTOLIC BLOOD PRESSURE: 84 MMHG | HEIGHT: 64 IN | BODY MASS INDEX: 23.05 KG/M2 | OXYGEN SATURATION: 97 % | SYSTOLIC BLOOD PRESSURE: 162 MMHG

## 2024-05-10 DIAGNOSIS — M50.30 OTHER CERVICAL DISC DEGENERATION, UNSPECIFIED CERVICAL REGION: ICD-10-CM

## 2024-05-10 DIAGNOSIS — M06.9 RHEUMATOID ARTHRITIS, UNSPECIFIED: ICD-10-CM

## 2024-05-10 DIAGNOSIS — Z79.899 OTHER LONG TERM (CURRENT) DRUG THERAPY: ICD-10-CM

## 2024-05-10 DIAGNOSIS — M79.7 FIBROMYALGIA: ICD-10-CM

## 2024-05-10 PROCEDURE — G2211 COMPLEX E/M VISIT ADD ON: CPT

## 2024-05-10 PROCEDURE — 99214 OFFICE O/P EST MOD 30 MIN: CPT

## 2024-05-10 RX ORDER — HYDROXYCHLOROQUINE SULFATE 200 MG/1
200 TABLET, FILM COATED ORAL
Qty: 90 | Refills: 3 | Status: ACTIVE | COMMUNITY
Start: 2023-08-04

## 2024-05-10 RX ORDER — DULOXETINE HYDROCHLORIDE 30 MG/1
30 CAPSULE, DELAYED RELEASE PELLETS ORAL
Qty: 30 | Refills: 3 | Status: ACTIVE | COMMUNITY
Start: 2020-07-06 | End: 1900-01-01

## 2024-05-10 NOTE — ASSESSMENT
[FreeTextEntry1] : Rheumatoid arthritis   - in remission DDD/ faucet arthropathy, s/p spinal sx Fibromyalgia knee OA , s/p TKR 11/6/17 TB screen in May2022 - neg  - labs for hep  - re-start Plaquenil 200 mg BId0 discussed side effects/ ophthalmology eval yearly - re-start Cymbalta 30 mg a day - pain management - PT  RTO 3-4 months or earlier if needed.

## 2024-05-10 NOTE — HISTORY OF PRESENT ILLNESS
[de-identified] : Last seen in August, 2024 [FreeTextEntry1] :  Interval history: --------------------- off Plaquenil since was in the hospital with UTI, Duloxetine also was stopped during the admission under care of Dr Dudley, pain management - had tender points injection feels very depressed / sees psychologist.

## 2024-05-10 NOTE — PHYSICAL EXAM
[General Appearance - Alert] : alert [General Appearance - In No Acute Distress] : in no acute distress [General Appearance - Well Nourished] : well nourished [General Appearance - Well Developed] : well developed [Sclera] : the sclera and conjunctiva were normal [Outer Ear] : the ears and nose were normal in appearance [Nasal Cavity] : the nasal mucosa and septum were normal [FreeTextEntry1] : no thrsh, no oral ukcers [Respiration, Rhythm And Depth] : normal respiratory rhythm and effort [Auscultation Breath Sounds / Voice Sounds] : lungs were clear to auscultation bilaterally [Heart Sounds] : normal S1 and S2 [Heart Sounds Gallop] : no gallops [Murmurs] : no murmurs [Edema] : there was no peripheral edema [Bowel Sounds] : normal bowel sounds [Abdomen Soft] : soft [Abdomen Tenderness] : non-tender [] : no rash [No Focal Deficits] : no focal deficits [Oriented To Time, Place, And Person] : oriented to person, place, and time [Impaired Insight] : insight and judgment were intact [Musculoskeletal - Swelling] : no joint swelling seen

## 2024-05-10 NOTE — CONSULT LETTER
[Dear  ___] : Dear  [unfilled], [Courtesy Letter:] : I had the pleasure of seeing your patient, [unfilled], in my office today. [Please see my note below.] : Please see my note below. [Consult Closing:] : Thank you very much for allowing me to participate in the care of this patient.  If you have any questions, please do not hesitate to contact me. [Sincerely,] : Sincerely, [FreeTextEntry2] : Orlando Dudley MD\par  Phone: [phone] [FreeTextEntry3] : Radha Lombardi MD\par  Director, [facility], \par  Rheumatology Division, Department of Medicine\par  , \par  [employer] \par  at [facility]\par  \par  [address], [address]\par  [place] [address]\par  Tel: [phone]\par

## 2024-06-07 ENCOUNTER — APPOINTMENT (OUTPATIENT)
Dept: OPHTHALMOLOGY | Facility: CLINIC | Age: 84
End: 2024-06-07
Payer: MEDICARE

## 2024-06-07 ENCOUNTER — NON-APPOINTMENT (OUTPATIENT)
Age: 84
End: 2024-06-07

## 2024-06-07 PROCEDURE — 92012 INTRM OPH EXAM EST PATIENT: CPT

## 2024-06-10 ENCOUNTER — APPOINTMENT (OUTPATIENT)
Dept: OPHTHALMOLOGY | Facility: CLINIC | Age: 84
End: 2024-06-10
Payer: MEDICARE

## 2024-06-10 ENCOUNTER — NON-APPOINTMENT (OUTPATIENT)
Age: 84
End: 2024-06-10

## 2024-06-10 PROCEDURE — 92012 INTRM OPH EXAM EST PATIENT: CPT

## 2024-06-12 ENCOUNTER — APPOINTMENT (OUTPATIENT)
Dept: OPHTHALMOLOGY | Facility: CLINIC | Age: 84
End: 2024-06-12
Payer: MEDICARE

## 2024-06-12 ENCOUNTER — NON-APPOINTMENT (OUTPATIENT)
Age: 84
End: 2024-06-12

## 2024-06-12 PROCEDURE — 92012 INTRM OPH EXAM EST PATIENT: CPT

## 2024-06-14 ENCOUNTER — APPOINTMENT (OUTPATIENT)
Dept: OPHTHALMOLOGY | Facility: CLINIC | Age: 84
End: 2024-06-14
Payer: MEDICARE

## 2024-06-14 ENCOUNTER — NON-APPOINTMENT (OUTPATIENT)
Age: 84
End: 2024-06-14

## 2024-06-14 PROCEDURE — 92012 INTRM OPH EXAM EST PATIENT: CPT

## 2024-06-17 ENCOUNTER — NON-APPOINTMENT (OUTPATIENT)
Age: 84
End: 2024-06-17

## 2024-06-17 ENCOUNTER — APPOINTMENT (OUTPATIENT)
Dept: OPHTHALMOLOGY | Facility: CLINIC | Age: 84
End: 2024-06-17
Payer: MEDICARE

## 2024-06-17 PROCEDURE — 92012 INTRM OPH EXAM EST PATIENT: CPT

## 2024-06-19 NOTE — ED CLERICAL - BED REQUESTED
Instructions: This plan will send the code FBSE to the PM system.  DO NOT or CHANGE the price. 10-Sylvester-2023 17:14 Price (Do Not Change): 0.00 Detail Level: Generalized

## 2024-06-21 ENCOUNTER — APPOINTMENT (OUTPATIENT)
Dept: OPHTHALMOLOGY | Facility: CLINIC | Age: 84
End: 2024-06-21
Payer: MEDICARE

## 2024-06-21 ENCOUNTER — NON-APPOINTMENT (OUTPATIENT)
Age: 84
End: 2024-06-21

## 2024-06-21 PROCEDURE — 92012 INTRM OPH EXAM EST PATIENT: CPT

## 2024-06-28 ENCOUNTER — APPOINTMENT (OUTPATIENT)
Dept: OPHTHALMOLOGY | Facility: CLINIC | Age: 84
End: 2024-06-28
Payer: MEDICARE

## 2024-06-28 PROCEDURE — 92012 INTRM OPH EXAM EST PATIENT: CPT

## 2024-07-10 ENCOUNTER — APPOINTMENT (OUTPATIENT)
Dept: OPHTHALMOLOGY | Facility: CLINIC | Age: 84
End: 2024-07-10

## 2024-07-12 ENCOUNTER — APPOINTMENT (OUTPATIENT)
Dept: RHEUMATOLOGY | Facility: CLINIC | Age: 84
End: 2024-07-12
Payer: MEDICARE

## 2024-07-12 VITALS
DIASTOLIC BLOOD PRESSURE: 78 MMHG | SYSTOLIC BLOOD PRESSURE: 165 MMHG | BODY MASS INDEX: 23.05 KG/M2 | OXYGEN SATURATION: 98 % | WEIGHT: 135 LBS | HEART RATE: 67 BPM | HEIGHT: 64 IN

## 2024-07-12 DIAGNOSIS — M06.9 RHEUMATOID ARTHRITIS, UNSPECIFIED: ICD-10-CM

## 2024-07-12 PROCEDURE — 99214 OFFICE O/P EST MOD 30 MIN: CPT

## 2024-07-12 PROCEDURE — G2211 COMPLEX E/M VISIT ADD ON: CPT

## 2024-07-15 ENCOUNTER — APPOINTMENT (OUTPATIENT)
Dept: UROLOGY | Facility: CLINIC | Age: 84
End: 2024-07-15
Payer: MEDICARE

## 2024-07-15 VITALS — SYSTOLIC BLOOD PRESSURE: 128 MMHG | TEMPERATURE: 97.6 F | DIASTOLIC BLOOD PRESSURE: 72 MMHG | HEART RATE: 77 BPM

## 2024-07-15 DIAGNOSIS — N20.0 CALCULUS OF KIDNEY: ICD-10-CM

## 2024-07-15 DIAGNOSIS — Z63.4 DISAPPEARANCE AND DEATH OF FAMILY MEMBER: ICD-10-CM

## 2024-07-15 DIAGNOSIS — D64.9 ANEMIA, UNSPECIFIED: ICD-10-CM

## 2024-07-15 LAB
ALBUMIN SERPL ELPH-MCNC: 3.8 G/DL
ALT SERPL-CCNC: 22 U/L
ANION GAP SERPL CALC-SCNC: 10 MMOL/L
AST SERPL-CCNC: 27 U/L
BASOPHILS # BLD AUTO: 0.08 K/UL
BASOPHILS NFR BLD AUTO: 0.9 %
BILIRUB SERPL-MCNC: 0.2 MG/DL
BUN SERPL-MCNC: 30 MG/DL
CALCIUM SERPL-MCNC: 9.6 MG/DL
CHLORIDE SERPL-SCNC: 108 MMOL/L
CO2 SERPL-SCNC: 24 MMOL/L
CREAT SERPL-MCNC: 1.11 MG/DL
CRP SERPL-MCNC: 18 MG/L
EGFR: 50 ML/MIN/1.73M2
EOSINOPHIL # BLD AUTO: 0.48 K/UL
EOSINOPHIL NFR BLD AUTO: 5.6 %
ERYTHROCYTE [SEDIMENTATION RATE] IN BLOOD BY WESTERGREN METHOD: 29 MM/HR
GLUCOSE SERPL-MCNC: 115 MG/DL
HBV CORE IGG+IGM SER QL: NONREACTIVE
HBV CORE IGM SER QL: NONREACTIVE
HBV SURFACE AB SER QL: NONREACTIVE
HBV SURFACE AG SER QL: NONREACTIVE
HCT VFR BLD CALC: 32.1 %
HCV AB SER QL: NONREACTIVE
HCV S/CO RATIO: 0.35 S/CO
HGB BLD-MCNC: 9.7 G/DL
IMM GRANULOCYTES NFR BLD AUTO: 0.5 %
LYMPHOCYTES # BLD AUTO: 2.57 K/UL
LYMPHOCYTES NFR BLD AUTO: 29.7 %
M TB IFN-G BLD-IMP: NEGATIVE
MAN DIFF?: NORMAL
MCHC RBC-ENTMCNC: 29.7 PG
MCHC RBC-ENTMCNC: 30.2 GM/DL
MCV RBC AUTO: 98.2 FL
MONOCYTES # BLD AUTO: 0.69 K/UL
MONOCYTES NFR BLD AUTO: 8 %
NEUTROPHILS NFR BLD AUTO: 55.3 %
PLATELET # BLD AUTO: 305 K/UL
POTASSIUM SERPL-SCNC: 4.5 MMOL/L
PROT SERPL-MCNC: 6.3 G/DL
QUANTIFERON TB PLUS MITOGEN MINUS NIL: 3.34 IU/ML
QUANTIFERON TB PLUS NIL: 0.04 IU/ML
QUANTIFERON TB PLUS TB1 MINUS NIL: 0 IU/ML
QUANTIFERON TB PLUS TB2 MINUS NIL: 0.01 IU/ML
RBC # BLD: 3.27 M/UL
RF+CCP IGG SER-IMP: ABNORMAL
RHEUMATOID FACT SER QL: 285 IU/ML
SODIUM SERPL-SCNC: 142 MMOL/L
WBC # FLD AUTO: 8.64 K/UL

## 2024-07-15 PROCEDURE — 99203 OFFICE O/P NEW LOW 30 MIN: CPT

## 2024-07-15 RX ORDER — BIOTIN 10 MG
TABLET ORAL
Refills: 0 | Status: ACTIVE | COMMUNITY

## 2024-07-15 RX ORDER — CHROMIUM 200 MCG
TABLET ORAL
Refills: 0 | Status: ACTIVE | COMMUNITY

## 2024-07-15 SDOH — SOCIAL STABILITY - SOCIAL INSECURITY: DISSAPEARANCE AND DEATH OF FAMILY MEMBER: Z63.4

## 2024-07-19 RX ORDER — PREDNISONE 5 MG/1
5 TABLET ORAL
Qty: 60 | Refills: 0 | Status: ACTIVE | COMMUNITY
Start: 2024-07-19 | End: 1900-01-01

## 2024-07-30 ENCOUNTER — APPOINTMENT (OUTPATIENT)
Dept: PULMONOLOGY | Facility: CLINIC | Age: 84
End: 2024-07-30
Payer: MEDICARE

## 2024-07-30 VITALS — OXYGEN SATURATION: 98 % | HEART RATE: 74 BPM | DIASTOLIC BLOOD PRESSURE: 71 MMHG | SYSTOLIC BLOOD PRESSURE: 169 MMHG

## 2024-07-30 DIAGNOSIS — R91.8 OTHER NONSPECIFIC ABNORMAL FINDING OF LUNG FIELD: ICD-10-CM

## 2024-07-30 DIAGNOSIS — R93.89 ABNORMAL FINDINGS ON DIAGNOSTIC IMAGING OF OTHER SPECIFIED BODY STRUCTURES: ICD-10-CM

## 2024-07-30 DIAGNOSIS — M06.9 RHEUMATOID ARTHRITIS, UNSPECIFIED: ICD-10-CM

## 2024-07-30 DIAGNOSIS — Z87.891 PERSONAL HISTORY OF NICOTINE DEPENDENCE: ICD-10-CM

## 2024-07-30 PROCEDURE — 94727 GAS DIL/WSHOT DETER LNG VOL: CPT

## 2024-07-30 PROCEDURE — 99204 OFFICE O/P NEW MOD 45 MIN: CPT | Mod: 25

## 2024-07-30 PROCEDURE — ZZZZZ: CPT

## 2024-07-30 PROCEDURE — 94010 BREATHING CAPACITY TEST: CPT

## 2024-07-30 PROCEDURE — 94729 DIFFUSING CAPACITY: CPT

## 2024-07-30 NOTE — PHYSICAL EXAM
[No Acute Distress] : no acute distress [Normal Oropharynx] : normal oropharynx [Normal Appearance] : normal appearance [No Neck Mass] : no neck mass [Normal Rate/Rhythm] : normal rate/rhythm [Normal S1, S2] : normal s1, s2 [No Murmurs] : no murmurs [No Resp Distress] : no resp distress [Clear to Auscultation Bilaterally] : clear to auscultation bilaterally [Normal to Percussion] : normal to percussion [No Abnormalities] : no abnormalities [Benign] : benign [No HSM] : no hsm [Normal Gait] : normal gait [No Clubbing] : no clubbing [No Cyanosis] : no cyanosis [No Edema] : no edema [FROM] : FROM [Normal Color/ Pigmentation] : normal color/ pigmentation [No Focal Deficits] : no focal deficits [Oriented x3] : oriented x3 [Normal Affect] : normal affect

## 2024-08-12 ENCOUNTER — EMERGENCY (EMERGENCY)
Facility: HOSPITAL | Age: 84
LOS: 1 days | Discharge: HOME | End: 2024-08-12
Attending: STUDENT IN AN ORGANIZED HEALTH CARE EDUCATION/TRAINING PROGRAM
Payer: MEDICARE

## 2024-08-12 VITALS
SYSTOLIC BLOOD PRESSURE: 182 MMHG | HEART RATE: 83 BPM | OXYGEN SATURATION: 96 % | RESPIRATION RATE: 18 BRPM | WEIGHT: 138.01 LBS | DIASTOLIC BLOOD PRESSURE: 103 MMHG | HEIGHT: 63 IN | TEMPERATURE: 98 F

## 2024-08-12 DIAGNOSIS — Z98.89 OTHER SPECIFIED POSTPROCEDURAL STATES: Chronic | ICD-10-CM

## 2024-08-12 DIAGNOSIS — H26.9 UNSPECIFIED CATARACT: Chronic | ICD-10-CM

## 2024-08-12 LAB
ALBUMIN SERPL ELPH-MCNC: 3.9 G/DL — SIGNIFICANT CHANGE UP (ref 3.3–5)
ALP SERPL-CCNC: 121 U/L — HIGH (ref 40–120)
ALT FLD-CCNC: 25 U/L — SIGNIFICANT CHANGE UP (ref 10–45)
ANION GAP SERPL CALC-SCNC: 14 MMOL/L — SIGNIFICANT CHANGE UP (ref 5–17)
AST SERPL-CCNC: 22 U/L — SIGNIFICANT CHANGE UP (ref 10–40)
B-OH-BUTYR SERPL-SCNC: 0.1 MMOL/L — SIGNIFICANT CHANGE UP
BASE EXCESS BLDV CALC-SCNC: 1 MMOL/L — SIGNIFICANT CHANGE UP (ref -2–3)
BILIRUB SERPL-MCNC: <0.1 MG/DL — LOW (ref 0.2–1.2)
BUN SERPL-MCNC: 29 MG/DL — HIGH (ref 7–23)
CA-I SERPL-SCNC: 1.29 MMOL/L — SIGNIFICANT CHANGE UP (ref 1.15–1.33)
CALCIUM SERPL-MCNC: 10.1 MG/DL — SIGNIFICANT CHANGE UP (ref 8.4–10.5)
CHLORIDE BLDV-SCNC: 100 MMOL/L — SIGNIFICANT CHANGE UP (ref 96–108)
CHLORIDE SERPL-SCNC: 97 MMOL/L — SIGNIFICANT CHANGE UP (ref 96–108)
CO2 BLDV-SCNC: 29 MMOL/L — HIGH (ref 22–26)
CO2 SERPL-SCNC: 22 MMOL/L — SIGNIFICANT CHANGE UP (ref 22–31)
CREAT SERPL-MCNC: 1 MG/DL — SIGNIFICANT CHANGE UP (ref 0.5–1.3)
EGFR: 56 ML/MIN/1.73M2 — LOW
GAS PNL BLDV: 129 MMOL/L — LOW (ref 136–145)
GAS PNL BLDV: SIGNIFICANT CHANGE UP
GAS PNL BLDV: SIGNIFICANT CHANGE UP
GLUCOSE BLDV-MCNC: 659 MG/DL — CRITICAL HIGH (ref 70–99)
GLUCOSE SERPL-MCNC: 603 MG/DL — CRITICAL HIGH (ref 70–99)
HCO3 BLDV-SCNC: 27 MMOL/L — SIGNIFICANT CHANGE UP (ref 22–29)
HCT VFR BLD CALC: 34.2 % — LOW (ref 34.5–45)
HCT VFR BLDA CALC: 31 % — LOW (ref 34.5–46.5)
HGB BLD CALC-MCNC: 10.3 G/DL — LOW (ref 11.7–16.1)
HGB BLD-MCNC: 10.3 G/DL — LOW (ref 11.5–15.5)
LACTATE BLDV-MCNC: 2.4 MMOL/L — HIGH (ref 0.5–2)
MCHC RBC-ENTMCNC: 28 PG — SIGNIFICANT CHANGE UP (ref 27–34)
MCHC RBC-ENTMCNC: 30.1 GM/DL — LOW (ref 32–36)
MCV RBC AUTO: 92.9 FL — SIGNIFICANT CHANGE UP (ref 80–100)
NRBC # BLD: 0 /100 WBCS — SIGNIFICANT CHANGE UP (ref 0–0)
OSMOLALITY SERPL: 312 MOSMOL/KG — HIGH (ref 280–301)
PCO2 BLDV: 49 MMHG — HIGH (ref 39–42)
PH BLDV: 7.35 — SIGNIFICANT CHANGE UP (ref 7.32–7.43)
PLATELET # BLD AUTO: 231 K/UL — SIGNIFICANT CHANGE UP (ref 150–400)
PO2 BLDV: 40 MMHG — SIGNIFICANT CHANGE UP (ref 25–45)
POTASSIUM BLDV-SCNC: 4.8 MMOL/L — SIGNIFICANT CHANGE UP (ref 3.5–5.1)
POTASSIUM SERPL-MCNC: 4.5 MMOL/L — SIGNIFICANT CHANGE UP (ref 3.5–5.3)
POTASSIUM SERPL-SCNC: 4.5 MMOL/L — SIGNIFICANT CHANGE UP (ref 3.5–5.3)
PROT SERPL-MCNC: 7 G/DL — SIGNIFICANT CHANGE UP (ref 6–8.3)
RBC # BLD: 3.68 M/UL — LOW (ref 3.8–5.2)
RBC # FLD: 14.8 % — HIGH (ref 10.3–14.5)
SAO2 % BLDV: 67.8 % — SIGNIFICANT CHANGE UP (ref 67–88)
SODIUM SERPL-SCNC: 133 MMOL/L — LOW (ref 135–145)
WBC # BLD: 7.2 K/UL — SIGNIFICANT CHANGE UP (ref 3.8–10.5)
WBC # FLD AUTO: 7.2 K/UL — SIGNIFICANT CHANGE UP (ref 3.8–10.5)

## 2024-08-12 PROCEDURE — 99285 EMERGENCY DEPT VISIT HI MDM: CPT

## 2024-08-12 RX ORDER — BACTERIOSTATIC SODIUM CHLORIDE 0.9 %
1000 VIAL (ML) INJECTION ONCE
Refills: 0 | Status: COMPLETED | OUTPATIENT
Start: 2024-08-12 | End: 2024-08-12

## 2024-08-12 RX ADMIN — Medication 1000 MILLILITER(S): at 23:45

## 2024-08-12 NOTE — ED PROVIDER NOTE - PROGRESS NOTE DETAILS
Chart reviewed including information from prior admission in 3/2024.  Had A1c below 6 and was DC'd off Ozempic. -Evette Mcguire MD (Attending) Repeat FSG improved. No evidence of DKA/HHS on labs. Seen by CDU, will keep for endocrine evaluation in the AM/restarting DM medication. Incidentally COVID+. -Evette Mcguire MD (Attending)

## 2024-08-12 NOTE — ED ADULT NURSE NOTE - NS ED STAT RN HAND OFF 2
Please let the patient know that I would prefer that she take Synthroid 125 mcg 1 tablet daily.  That extra dose is making her hyperthyroid.  Her thyroid level previously to increasing dose, was very good.    It is not uncommon to have more fatigue over the winter months.   Additional handoff

## 2024-08-12 NOTE — ED PROVIDER NOTE - CLINICAL SUMMARY MEDICAL DECISION MAKING FREE TEXT BOX
84-year-old female, history of type 2 diabetes, hypothyroidism, rheumatoid arthritis and Sjogren's syndrome, HTN, cirrhosis, sent by PMD for hyperglycemia.  Patient endorses that she was taken off her diabetes medications since an admission in 3/2020 for, has had progressive polyuria and polydipsia over the last few weeks with some episodes of urinary incontinence over the last few days.  Otherwise denies fevers or chills, chest pain, abdominal pain, nausea vomiting, diarrhea, dysuria, hematuria.  Was started on prednisone on 7/19/2024 by her rheumatologist. Has additionally been having worsening left eye discomfort and dryness, states she is struggled with dry eye for many years and is seeing her ophthalmologist later this week. Appears well, AOx3, not in acute distress. Normal heart/lung sounds, abdomen soft, NT/ND. Hyperglycemic to >600 on multiple FSG, will send labs/UA to assess for hyperglycemia vs. HHS/DKA. Possibly i/s/o recent initiation on steroids + not on diabetes medications. Will give fluids, likely insulin pending initial results. -Evette Mcguire MD (Attending) 84-year-old female, history of type 2 diabetes, hypothyroidism, rheumatoid arthritis and Sjogren's syndrome, HTN, cirrhosis, sent by PMD for hyperglycemia.  Patient endorses that she was taken off her diabetes medications since an admission in 3/2024, has had progressive polyuria and polydipsia over the last few weeks with some episodes of urinary incontinence over the last few days.  Otherwise denies fevers or chills, chest pain, abdominal pain, nausea or vomiting, diarrhea, dysuria, hematuria.  Was started on prednisone on 7/19/2024 by her rheumatologist. Has additionally been having worsening left eye discomfort and dryness, states she has struggled with dry eye for many years and is seeing her ophthalmologist later this week. Appears well, AOx3, not in acute distress. Normal heart/lung sounds, abdomen soft, NT/ND. Hyperglycemic to >600 on multiple FSG, will send labs/UA to assess for hyperglycemia vs. HHS/DKA. Possibly i/s/o recent initiation on steroids + not on diabetes medications. Will give fluids, likely insulin pending initial results. -Evette Mcguire MD (Attending)

## 2024-08-12 NOTE — ED ADULT NURSE NOTE - OBJECTIVE STATEMENT
Pt is a 83 y/o F with PMH of non insulin dependent DM II, Hypothyroidism, Nephrolithiasis, Cirrhosis, Osteopenia, RA, Sjogren syndrome presenting with urinary incontinence, polydipsia, and BL hand itching since Saturday. Sent from outpatient Dr. rosales "high blood sugar" with a fs of 600+ in triage. Pt endorses being on Metformin prior stopping months ago. Upon assessment pt is a/o x 3 speaking coherently in full sentences. Pt is breathing unlabored and equal BL in no apparent distress, cardaic rhythm is NSR, abdomen is soft, nontender, and nondistended, skin is warm and dry. Pt denies fevers/chills, headaches/vision changes, chest pain/SOB, n/v/d, or changes in urinary/defecation habits. Pt is in stretcher in lowest position with side rails up.

## 2024-08-13 VITALS
TEMPERATURE: 99 F | OXYGEN SATURATION: 97 % | RESPIRATION RATE: 20 BRPM | HEART RATE: 82 BPM | DIASTOLIC BLOOD PRESSURE: 77 MMHG | SYSTOLIC BLOOD PRESSURE: 152 MMHG

## 2024-08-13 DIAGNOSIS — E11.9 TYPE 2 DIABETES MELLITUS WITHOUT COMPLICATIONS: ICD-10-CM

## 2024-08-13 DIAGNOSIS — R73.9 HYPERGLYCEMIA, UNSPECIFIED: ICD-10-CM

## 2024-08-13 DIAGNOSIS — E78.5 HYPERLIPIDEMIA, UNSPECIFIED: ICD-10-CM

## 2024-08-13 DIAGNOSIS — I10 ESSENTIAL (PRIMARY) HYPERTENSION: ICD-10-CM

## 2024-08-13 LAB
A1C WITH ESTIMATED AVERAGE GLUCOSE RESULT: 8.9 % — HIGH (ref 4–5.6)
ANION GAP SERPL CALC-SCNC: 13 MMOL/L — SIGNIFICANT CHANGE UP (ref 5–17)
APPEARANCE UR: CLEAR — SIGNIFICANT CHANGE UP
BACTERIA # UR AUTO: NEGATIVE /HPF — SIGNIFICANT CHANGE UP
BASE EXCESS BLDV CALC-SCNC: 1.9 MMOL/L — SIGNIFICANT CHANGE UP (ref -2–3)
BILIRUB UR-MCNC: NEGATIVE — SIGNIFICANT CHANGE UP
BUN SERPL-MCNC: 22 MG/DL — SIGNIFICANT CHANGE UP (ref 7–23)
CA-I SERPL-SCNC: 1.33 MMOL/L — SIGNIFICANT CHANGE UP (ref 1.15–1.33)
CALCIUM SERPL-MCNC: 9.6 MG/DL — SIGNIFICANT CHANGE UP (ref 8.4–10.5)
CAST: 0 /LPF — SIGNIFICANT CHANGE UP (ref 0–4)
CHLORIDE BLDV-SCNC: 107 MMOL/L — SIGNIFICANT CHANGE UP (ref 96–108)
CHLORIDE SERPL-SCNC: 106 MMOL/L — SIGNIFICANT CHANGE UP (ref 96–108)
CHOLEST SERPL-MCNC: 199 MG/DL — SIGNIFICANT CHANGE UP
CO2 BLDV-SCNC: 29 MMOL/L — HIGH (ref 22–26)
CO2 SERPL-SCNC: 22 MMOL/L — SIGNIFICANT CHANGE UP (ref 22–31)
COLOR SPEC: YELLOW — SIGNIFICANT CHANGE UP
CREAT SERPL-MCNC: 0.87 MG/DL — SIGNIFICANT CHANGE UP (ref 0.5–1.3)
DIFF PNL FLD: NEGATIVE — SIGNIFICANT CHANGE UP
EGFR: 66 ML/MIN/1.73M2 — SIGNIFICANT CHANGE UP
ESTIMATED AVERAGE GLUCOSE: 209 MG/DL — HIGH (ref 68–114)
FLUAV AG NPH QL: SIGNIFICANT CHANGE UP
FLUBV AG NPH QL: SIGNIFICANT CHANGE UP
GAS PNL BLDV: 138 MMOL/L — SIGNIFICANT CHANGE UP (ref 136–145)
GAS PNL BLDV: SIGNIFICANT CHANGE UP
GAS PNL BLDV: SIGNIFICANT CHANGE UP
GLUCOSE BLDC GLUCOMTR-MCNC: 116 MG/DL — HIGH (ref 70–99)
GLUCOSE BLDC GLUCOMTR-MCNC: 212 MG/DL — HIGH (ref 70–99)
GLUCOSE BLDC GLUCOMTR-MCNC: 337 MG/DL — HIGH (ref 70–99)
GLUCOSE BLDV-MCNC: 59 MG/DL — LOW (ref 70–99)
GLUCOSE SERPL-MCNC: 62 MG/DL — LOW (ref 70–99)
GLUCOSE UR QL: >=1000 MG/DL
HCO3 BLDV-SCNC: 28 MMOL/L — SIGNIFICANT CHANGE UP (ref 22–29)
HCT VFR BLDA CALC: 32 % — LOW (ref 34.5–46.5)
HDLC SERPL-MCNC: 83 MG/DL — SIGNIFICANT CHANGE UP
HGB BLD CALC-MCNC: 10.8 G/DL — LOW (ref 11.7–16.1)
KETONES UR-MCNC: NEGATIVE MG/DL — SIGNIFICANT CHANGE UP
LACTATE BLDV-MCNC: 1.5 MMOL/L — SIGNIFICANT CHANGE UP (ref 0.5–2)
LEUKOCYTE ESTERASE UR-ACNC: NEGATIVE — SIGNIFICANT CHANGE UP
LIPID PNL WITH DIRECT LDL SERPL: 99 MG/DL — SIGNIFICANT CHANGE UP
NITRITE UR-MCNC: NEGATIVE — SIGNIFICANT CHANGE UP
NON HDL CHOLESTEROL: 116 MG/DL — SIGNIFICANT CHANGE UP
PCO2 BLDV: 48 MMHG — HIGH (ref 39–42)
PH BLDV: 7.37 — SIGNIFICANT CHANGE UP (ref 7.32–7.43)
PH UR: 7 — SIGNIFICANT CHANGE UP (ref 5–8)
PO2 BLDV: 36 MMHG — SIGNIFICANT CHANGE UP (ref 25–45)
POTASSIUM BLDV-SCNC: 3.5 MMOL/L — SIGNIFICANT CHANGE UP (ref 3.5–5.1)
POTASSIUM SERPL-MCNC: 3.5 MMOL/L — SIGNIFICANT CHANGE UP (ref 3.5–5.3)
POTASSIUM SERPL-SCNC: 3.5 MMOL/L — SIGNIFICANT CHANGE UP (ref 3.5–5.3)
PROT UR-MCNC: SIGNIFICANT CHANGE UP MG/DL
RBC CASTS # UR COMP ASSIST: 1 /HPF — SIGNIFICANT CHANGE UP (ref 0–4)
RSV RNA NPH QL NAA+NON-PROBE: SIGNIFICANT CHANGE UP
SAO2 % BLDV: 61.2 % — LOW (ref 67–88)
SARS-COV-2 RNA SPEC QL NAA+PROBE: DETECTED
SODIUM SERPL-SCNC: 141 MMOL/L — SIGNIFICANT CHANGE UP (ref 135–145)
SP GR SPEC: >1.03 — HIGH (ref 1–1.03)
SQUAMOUS # UR AUTO: 1 /HPF — SIGNIFICANT CHANGE UP (ref 0–5)
TRIGL SERPL-MCNC: 98 MG/DL — SIGNIFICANT CHANGE UP
UROBILINOGEN FLD QL: 0.2 MG/DL — SIGNIFICANT CHANGE UP (ref 0.2–1)
WBC UR QL: 1 /HPF — SIGNIFICANT CHANGE UP (ref 0–5)

## 2024-08-13 PROCEDURE — 82803 BLOOD GASES ANY COMBINATION: CPT

## 2024-08-13 PROCEDURE — 85014 HEMATOCRIT: CPT

## 2024-08-13 PROCEDURE — 87086 URINE CULTURE/COLONY COUNT: CPT

## 2024-08-13 PROCEDURE — 85018 HEMOGLOBIN: CPT

## 2024-08-13 PROCEDURE — 82435 ASSAY OF BLOOD CHLORIDE: CPT

## 2024-08-13 PROCEDURE — G0378: CPT

## 2024-08-13 PROCEDURE — 80053 COMPREHEN METABOLIC PANEL: CPT

## 2024-08-13 PROCEDURE — 80048 BASIC METABOLIC PNL TOTAL CA: CPT

## 2024-08-13 PROCEDURE — 87637 SARSCOV2&INF A&B&RSV AMP PRB: CPT

## 2024-08-13 PROCEDURE — 81001 URINALYSIS AUTO W/SCOPE: CPT

## 2024-08-13 PROCEDURE — 84132 ASSAY OF SERUM POTASSIUM: CPT

## 2024-08-13 PROCEDURE — 82947 ASSAY GLUCOSE BLOOD QUANT: CPT

## 2024-08-13 PROCEDURE — 71045 X-RAY EXAM CHEST 1 VIEW: CPT | Mod: 26

## 2024-08-13 PROCEDURE — 71045 X-RAY EXAM CHEST 1 VIEW: CPT

## 2024-08-13 PROCEDURE — 84295 ASSAY OF SERUM SODIUM: CPT

## 2024-08-13 PROCEDURE — 80061 LIPID PANEL: CPT

## 2024-08-13 PROCEDURE — 85027 COMPLETE CBC AUTOMATED: CPT

## 2024-08-13 PROCEDURE — 82010 KETONE BODYS QUAN: CPT

## 2024-08-13 PROCEDURE — 99285 EMERGENCY DEPT VISIT HI MDM: CPT

## 2024-08-13 PROCEDURE — 82962 GLUCOSE BLOOD TEST: CPT

## 2024-08-13 PROCEDURE — 36415 COLL VENOUS BLD VENIPUNCTURE: CPT

## 2024-08-13 PROCEDURE — 83605 ASSAY OF LACTIC ACID: CPT

## 2024-08-13 PROCEDURE — 82330 ASSAY OF CALCIUM: CPT

## 2024-08-13 PROCEDURE — 83036 HEMOGLOBIN GLYCOSYLATED A1C: CPT

## 2024-08-13 PROCEDURE — 99284 EMERGENCY DEPT VISIT MOD MDM: CPT | Mod: 25

## 2024-08-13 PROCEDURE — 99236 HOSP IP/OBS SAME DATE HI 85: CPT

## 2024-08-13 PROCEDURE — 83930 ASSAY OF BLOOD OSMOLALITY: CPT

## 2024-08-13 RX ORDER — LEVOTHYROXINE SODIUM 175 MCG
100 TABLET ORAL DAILY
Refills: 0 | Status: ACTIVE | OUTPATIENT
Start: 2024-08-13 | End: 2025-07-12

## 2024-08-13 RX ORDER — DEXTROSE 4 G
25 TABLET,CHEWABLE ORAL ONCE
Refills: 0 | Status: ACTIVE | OUTPATIENT
Start: 2024-08-13

## 2024-08-13 RX ORDER — DEXTROSE MONOHYDRATE, SODIUM CHLORIDE, SODIUM LACTATE, CALCIUM CHLORIDE, MAGNESIUM CHLORIDE 1.5; 538; 448; 18.4; 5.08 G/100ML; MG/100ML; MG/100ML; MG/100ML; MG/100ML
1000 SOLUTION INTRAPERITONEAL
Refills: 0 | Status: ACTIVE | OUTPATIENT
Start: 2024-08-13 | End: 2025-07-12

## 2024-08-13 RX ORDER — METOPROLOL TARTRATE 100 MG
25 TABLET ORAL DAILY
Refills: 0 | Status: ACTIVE | OUTPATIENT
Start: 2024-08-13 | End: 2025-07-12

## 2024-08-13 RX ORDER — HYDRALAZINE HYDROCHLORIDE 100 MG/1
25 TABLET ORAL THREE TIMES A DAY
Refills: 0 | Status: ACTIVE | OUTPATIENT
Start: 2024-08-13 | End: 2025-07-12

## 2024-08-13 RX ORDER — DEXTROSE 4 G
15 TABLET,CHEWABLE ORAL ONCE
Refills: 0 | Status: ACTIVE | OUTPATIENT
Start: 2024-08-13 | End: 2025-07-12

## 2024-08-13 RX ORDER — INSULIN LISPRO 100/ML
10 VIAL (ML) SUBCUTANEOUS ONCE
Refills: 0 | Status: COMPLETED | OUTPATIENT
Start: 2024-08-13 | End: 2024-08-13

## 2024-08-13 RX ORDER — LINAGLIPTIN 5 MG/1
1 TABLET, FILM COATED ORAL
Qty: 30 | Refills: 0
Start: 2024-08-13 | End: 2024-09-11

## 2024-08-13 RX ORDER — DEXTROSE 4 G
12.5 TABLET,CHEWABLE ORAL ONCE
Refills: 0 | Status: ACTIVE | OUTPATIENT
Start: 2024-08-13

## 2024-08-13 RX ORDER — PANTOPRAZOLE SODIUM 20 MG/1
40 TABLET, DELAYED RELEASE ORAL
Refills: 0 | Status: ACTIVE | OUTPATIENT
Start: 2024-08-13 | End: 2025-07-12

## 2024-08-13 RX ORDER — HYDROXYCHLOROQUINE SULFATE 200 MG/1
200 TABLET ORAL
Refills: 0 | Status: ACTIVE | OUTPATIENT
Start: 2024-08-13 | End: 2025-07-12

## 2024-08-13 RX ORDER — INSULIN LISPRO 100/ML
VIAL (ML) SUBCUTANEOUS AT BEDTIME
Refills: 0 | Status: ACTIVE | OUTPATIENT
Start: 2024-08-13 | End: 2025-07-12

## 2024-08-13 RX ORDER — INSULIN LISPRO 100/ML
VIAL (ML) SUBCUTANEOUS
Refills: 0 | Status: ACTIVE | OUTPATIENT
Start: 2024-08-13 | End: 2025-07-12

## 2024-08-13 RX ORDER — GLUCAGON INJECTION, SOLUTION 0.5 MG/.1ML
1 INJECTION, SOLUTION SUBCUTANEOUS ONCE
Refills: 0 | Status: ACTIVE | OUTPATIENT
Start: 2024-08-13 | End: 2025-07-12

## 2024-08-13 RX ORDER — LOSARTAN POTASSIUM 50 MG/1
25 TABLET, FILM COATED ORAL DAILY
Refills: 0 | Status: ACTIVE | OUTPATIENT
Start: 2024-08-13 | End: 2025-07-12

## 2024-08-13 RX ORDER — PREDNISONE 10 MG/1
5 TABLET ORAL ONCE
Refills: 0 | Status: COMPLETED | OUTPATIENT
Start: 2024-08-13 | End: 2024-08-13

## 2024-08-13 RX ADMIN — Medication 10 UNIT(S): at 02:30

## 2024-08-13 RX ADMIN — PANTOPRAZOLE SODIUM 40 MILLIGRAM(S): 20 TABLET, DELAYED RELEASE ORAL at 06:37

## 2024-08-13 RX ADMIN — PREDNISONE 5 MILLIGRAM(S): 10 TABLET ORAL at 13:29

## 2024-08-13 RX ADMIN — HYDROXYCHLOROQUINE SULFATE 200 MILLIGRAM(S): 200 TABLET ORAL at 06:37

## 2024-08-13 RX ADMIN — HYDRALAZINE HYDROCHLORIDE 25 MILLIGRAM(S): 100 TABLET ORAL at 16:51

## 2024-08-13 RX ADMIN — HYDROXYCHLOROQUINE SULFATE 200 MILLIGRAM(S): 200 TABLET ORAL at 17:31

## 2024-08-13 RX ADMIN — Medication 4: at 12:39

## 2024-08-13 RX ADMIN — HYDRALAZINE HYDROCHLORIDE 25 MILLIGRAM(S): 100 TABLET ORAL at 08:27

## 2024-08-13 RX ADMIN — Medication 25 MILLIGRAM(S): at 08:15

## 2024-08-13 RX ADMIN — LOSARTAN POTASSIUM 25 MILLIGRAM(S): 50 TABLET, FILM COATED ORAL at 06:36

## 2024-08-13 RX ADMIN — Medication 2: at 16:46

## 2024-08-13 RX ADMIN — Medication 100 MICROGRAM(S): at 06:36

## 2024-08-13 NOTE — ED CDU PROVIDER DISPOSITION NOTE - PATIENT PORTAL LINK FT
You can access the FollowMyHealth Patient Portal offered by [facility] by registering at the following website: [email or web address]. By joining [employer]’s FollowMyHealth portal, you will also be able to view your health information using other applications (apps) compatible with our system.

## 2024-08-13 NOTE — ED CDU PROVIDER INITIAL DAY NOTE - OBJECTIVE STATEMENT
84-year-old female, history of type 2 diabetes, hypothyroidism, rheumatoid arthritis and Sjogren's syndrome, HTN, cirrhosis, sent by PMD for hyperglycemia.  Patient endorses that she was taken off her diabetes medications since an admission in 3/2020 for, has had progressive polyuria and polydipsia over the last few weeks with some episodes of urinary incontinence over the last few days.  Otherwise denies fevers or chills, chest pain, abdominal pain, nausea vomiting, diarrhea, dysuria, hematuria.  Was started on prednisone on 7/19/2024 by her rheumatologist.   In ED, patient Hyperglycemic to >600 on multiple FSG, Chart reviewed including information from prior admission in 3/2024.  Had A1c below 6 and was DC'd off Ozempic. Pt received 1L NS IVPB and 10 units subcut Admelog. Repeat FSG improved. No evidence of DKA/HHS on labs. COVID detected. Pt sent to CDU for frequent reeval, vitals q 4hrs, endocrine evaluation in the AM/restarting DM medication.

## 2024-08-13 NOTE — ED CDU PROVIDER INITIAL DAY NOTE - CLINICAL SUMMARY MEDICAL DECISION MAKING FREE TEXT BOX
Medical Decision Making / Differential Diagnosis:  HPI consistent with corticosteroid induced hyperglycemia without significant metabolic derangements.  Blood sugars have since improved while in the ED.    Patient has a follow-up visit with her rheumatologist tomorrow, but will need to reschedule on account of the new COVID diagnosis.  We will reach out to her rheumatologist to discuss a tapered dose of prednisone.    Patient will likely need a short course of antidiabetic medication to control her blood sugar while she tapers off of the prednisone.

## 2024-08-13 NOTE — ED CDU PROVIDER DISPOSITION NOTE - NSFOLLOWUPINSTRUCTIONS_ED_ALL_ED_FT
1) Follow-up with your Primary Medical Doctor or referred doctor. Call today / next business day for prompt follow-up.  2) Return to Emergency room for any worsening or persistent pain, weakness, fever, or any other concerning symptoms.  3) See attached instruction sheets for additional information, including information regarding signs and symptoms to look out for, reasons to seek immediate care and other important instructions.  4) Follow-up with any specialists as discussed / noted as well. 1. Stay hydrated. Rest.   2. Check finger sticks glucose levels 2-3x/day. Continue current home medications e    3. Follow up with your PCP or Medicine clinic #[phone] in 2 days. Follow up with Endocrine clinic [phone] in 2-3 days. Bring Printed Results.  4. Return if symptoms worsen, fever, weakness, dizziness, and all other concerns. 1. Stay hydrated. Rest.   2. Check finger sticks glucose levels 2-3x/day. Continue current home medications EXCEPT decrease your prednisone dose to Prednisone 5mg daily (you confirmed you have at home). Also, Start Metformin 500mg 2x/day and Tradjenta 5mg daily.   3. Follow up with your PCP within 2 days. Follow up with Endocrine clinic [phone] within 3 weeks. Follow up with your Rheumatologist in 10 days, please call to make appointment. Bring Printed Results.  4. Return if symptoms worsen, fever, weakness, dizziness, and all other concerns.      follow up the following with your doctors:  anemia (low hemoglobin)  a1c 8.9, high glucose levels  elevated alk phos  +Covid-19- follow current CDC recommendations in regards to isolation    Hyperglycemia  Hyperglycemia is when the amount of sugar, or glucose, in your blood is too high. High blood sugar can happen if you have diabetes or if you don't have diabetes. It may be an emergency.    What are the causes?  If you have diabetes, high blood sugar may be caused by:  Medicines that increase blood sugar.  Not giving yourself enough insulin (if you take it).  Being less active than normal.  Eating more than planned.  Illness, an injury, or an infection.  Having surgery.  Stress.  If you don't have diabetes, high blood sugar may be caused by:  Certain medicines, such as steroids or thiazide diuretics.  Stress.  A bad illness or infection.  Having surgery.  Diseases of the pancreas.  What increases the risk?  You're more likely to have high blood sugar if:  Someone in your family has diabetes.  You're overweight.  You aren't active.  You have or have had:  Prediabetes.  Diabetes when pregnant.  Polycystic ovarian syndrome (PCOS).  What are the signs or symptoms?  High blood sugar may not cause symptoms. If you do have symptoms, they may include:  Feeling more thirsty than normal.  Needing to pee more often than normal.  Hunger.  Feeling very tired.  Blurry eyesight.  You may have other symptoms if your high blood sugar isn't treated. These may include:  Pain in your belly.  A headache.  Weakness.  Weight loss that's not planned.  A tingling or numb feeling in your hands or feet.  Cuts or bruises that heal slowly.  How is this diagnosed?  A person taking blood from a finger to check blood sugar levels.  High blood sugar is diagnosed with a blood test. This test tells you how much sugar is in your blood. It's done while you're having symptoms.    Your health care provider may also do a physical exam, look at your medical history, and do more blood tests. These tests may include:  A fasting blood glucose (FBG) test. You can't eat for at least 8 hours before this test.  An A1C test.  A glucose tolerance test.  How is this treated?  Treatment may include:  Taking medicine to control your blood sugar levels.  Changing your medicine or how much you take if you take insulin or other diabetes medicines.  Checking your blood sugar more often.  Making changes to your daily life. These may include:  Being more active.  Eating healthier foods.  Losing weight.  Treating an illness or infection.  Stopping or taking less steroids.  If your high blood sugar stays high, you may need to be treated in the hospital.    Follow these instructions at home:  If you have diabetes:    A plate with healthy, colorful foods.  Know the symptoms of high blood sugar.  Follow your diabetes care plan. Make sure you:  Take insulin and medicines as told.  Check your blood sugar as often as told.  Eat on time. Do not skip meals.  Check your blood sugar before and after you exercise. If you exercise longer or harder than normal, check your blood sugar more often.  Follow your sick day plan when you can't eat or drink like normal. Make this plan ahead of time with your provider.  Share your diabetes care plan with:  Your work or school.  The people you live with.  Wear an alert bracelet or carry a card that says you have diabetes.  General instructions    Take medicines only as told by your provider.  Drink enough fluid to keep your pee (urine) pale yellow. Make sure you drink enough when you:  Exercise.  Get sick.  Are in hot places.  If you drink alcohol:  Limit how much you have to:  0–1 drink a day if you're female.  0–2 drinks a day if you're male.  Know how much alcohol is in your drink. In the [place] one drink is one 12 oz bottle of beer (355 mL), one 5 oz glass of wine (148 mL), or one 1½ oz glass of hard liquor (44 mL).  Manage stress. If you need help with this, ask your provider.  Exercise as told. Try to stay at a healthy weight.  Keep all follow-up visits. Your provider will want to make sure your high blood sugar is treated.  Where to find more information  American Diabetes Association (ADA): diabetes.org  Contact a health care provider if:  You have diabetes and have trouble keeping your blood sugar in the right range.  Your blood sugar is at or above 240 mg/dL (13.3 mmol/L) for 2 days in a row.  You have high blood sugar often.  You have signs of illness, such as:  Nausea or vomiting.  A headache.  A fever.  You can't stop vomiting.  Get help right away if:  Your blood sugar monitor reads "high" even when you're taking insulin.  You have trouble breathing.  These symptoms may be an emergency. Call 911 right away.  Do not wait to see if the symptoms will go away.  Do not drive yourself to the hospital.  This information is not intended to replace advice given to you by your health care provider. Make sure you discuss any questions you have with your health care provider.    Document Revised: 03/06/2024 Document Reviewed: 03/06/2024  [employer] Patient Education © 2024 [employer]  [employer] logo  Terms and Conditions  Privacy Policy  Editorial Policy  All content on this site: Copyright © 2024 [employer], its licensors, and contributors. All rights are reserved, including those for text and data mining, AI training, and similar technologies. For all open access content, the Creative Commons licensing terms apply.  Cookies are used by this site. To decline or learn more, visit our Cookies page.

## 2024-08-13 NOTE — ED CDU PROVIDER INITIAL DAY NOTE - DETAILS
84-year-old female, history of type 2 diabetes, hypothyroidism, rheumatoid arthritis and Sjogren's syndrome, HTN, cirrhosis, sent by PMD for hyperglycemia.    Plan: frequent reeval, vitals q 4hrs, endocrine evaluation in the AM/restarting DM medication.

## 2024-08-13 NOTE — ED ADULT NURSE REASSESSMENT NOTE - NS ED NURSE REASSESS COMMENT FT1
Pt received from RICHARDSON Santos at 0530. Pt oriented to CDU and plan of care was discussed. Pt is observed for hyperglycemia, here for FS monitoring, Xray, endocrine eval. Pt denies polydipsia, polyuria, polyphagia at this time. A&Ox4, obeys commands, ambulatory, independent. Respirations spontaneous and unlabored. Denies SOB, dyspnea, cough, CP, palpitations. IV site patent, no signs of infiltration noted. Denies N/V/D/C. Pt afebrile, denies chills. Pt resting in bed. Safety & comfort measures maintained. Call bell within reach. Pt received from RN Nuvia Santos at 0530. Pt maintained on isolation precautions for (+) COVID. Pt oriented to CDU and plan of care was discussed. Pt is observed for hyperglycemia, here for FS monitoring, Xray, endocrine eval. Pt denies polydipsia, polyuria, polyphagia at this time. A&Ox4, obeys commands, ambulatory, independent. Respirations spontaneous and unlabored. Denies SOB, dyspnea, cough, CP, palpitations. IV site patent, no signs of infiltration noted. Denies N/V/D/C. Pt afebrile, denies chills. Pt resting in bed. Safety & comfort measures maintained. Call bell within reach.

## 2024-08-13 NOTE — ED CDU PROVIDER DISPOSITION NOTE - ATTENDING APP SHARED VISIT CONTRIBUTION OF CARE
Emergency Medicine Attending MD Elena: :  I have personally performed a face to face diagnostic evaluation on this patient with the PA.  I have reviewed the ACP note and agree with the history, exam, and plan of care, except as noted.  Patient dispositioned to the CDU by Dr. Mcguire.     History and Exam by me shows a 84-year-old female, sent to the ED for endocrine evaluation of hyperglycemia in the context of prednisone use.  Patient has a medical history of rheumatoid arthritis; was having a flare last month and was prescribed 7.5 mg prednisone daily.  Patient states that her arthralgias have significantly improved however she developed polyuria polydipsia and came to the ED yesterday, at which time her fingerstick glucose was found to be greater than 600.  Patient had no significant anion gap.  Serum bicarbonate 22.  pH 7.35.  Blood glucose is since improved.    Patient incidentally diagnosed with COVID in the ED; endorses 2 to 3 days of nasal congestion and sore throat.    VS: wnl  Gen: Well appearing elderly female in NAD  Head: NC/AT  Neck: trachea midline  Resp:  No distress  CV: RRR, no RMG  Abd: nondistended  Ext: no deformities  Neuro:  A&Ox4 appears non focal  Skin:  Warm and dry as visualized  Psych: appropriate    Medical Decision Making / Differential Diagnosis:  HPI consistent with corticosteroid induced hyperglycemia without significant metabolic derangements.  Blood sugars have since improved while in the ED.    Patient has a follow-up visit with her rheumatologist tomorrow, but will need to reschedule on account of the new COVID diagnosis.  We will reach out to her rheumatologist to discuss a tapered dose of prednisone.    Patient will likely need a short course of antidiabetic medication to control her blood sugar while she tapers off of the prednisone.

## 2024-08-13 NOTE — ED CDU PROVIDER INITIAL DAY NOTE - PHYSICAL EXAMINATION
GEN: awake and alert, well-appearing, no acute distress  HEAD: (-) scalp swelling, (-) tenderness  EYES: (-) conjunctival pallor, (-) scleral icterus  ENMT: (+) moist mucous membranes, (+) airway patent, (-) stridor  NECK: (-) tenderness, (-) stiffness  CV: (+) RRR, (-) murmurs/rubs/gallops  RESP: (+) CTABL, (-) increased WOB, (-) rales, (-) rhonchi, (-) wheezing  ABD: (+) soft, (-) tenderness, (-) guarding  EXT: (-) joint deformities, (-) edema, (-) tenderness, (+) grossly intact ROM, (+) equal pulses in upper & lower extremities

## 2024-08-13 NOTE — CONSULT NOTE ADULT - SUBJECTIVE AND OBJECTIVE BOX
HPI:  84-year-old female, history of type 2 diabetes, hypothyroidism, rheumatoid arthritis and Sjogren's syndrome, HTN, cirrhosis, sent by PMD for hyperglycemia.  Patient endorses that she was taken off her diabetes medications since an admission in 3/2020 for, has had progressive polyuria and polydipsia over the last few weeks with some episodes of urinary incontinence over the last few days.  Otherwise denies fevers or chills, chest pain, abdominal pain, nausea vomiting, diarrhea, dysuria, hematuria.  Was started on prednisone on 7/19/2024 by her rheumatologist.   In ED, patient Hyperglycemic to >600 on multiple FSG, Chart reviewed including information from prior admission in 3/2024.  Had A1c below 6 and was DC'd off Ozempic. Pt received 1L NS IVPB and 10 units subcut Admelog. Repeat FSG improved. No evidence of DKA/HHS on labs. COVID detected. Pt sent to CDU for frequent reeval, vitals q 4hrs, endocrine evaluation in the AM/restarting DM medication.    Endocrinology HPI    Diabetes Mellitus Type 2  Diagnosis: "many years ago"  Symptoms: + Polyuria, polydipsia  Patient states that she was diagnosed many years ago with type 2 DM and at the time was taking Ozempic and MFM. She states that around 2020, she was told to stop all her medications because her blood sugars were doing good and A1c had improved. She also states that Ozempic gave her significant GI side effects.   She mentions that she occasionally checked her sugars at home every few months and they were always in the 100s. Last sugar check was in March 2024 and it was 131.   In July 2024, she was started on Prednisone for her RA. She is currently on Prednisone 7.5mg daily.  She mentions that over the last week she has experienced significant polyuria, polydipsia, dry mouth, dry eyes.   A1c in the hospital is 8.9%   Diet is high in sugars, enjoys eating sweets- donuts, cookies, cake and diet is high in carbs.     Review of Systems:  Constitutional: No fever, good appetite/po intake  Eyes: No blurry vision, diplopia  Neuro: No tremors  HEENT: No pain  Cardiovascular: No chest pain, palpitations  Respiratory: No SOB, no cough  GI: No nausea, vomiting,   : No dysuria, hematuria  Skin: no rash  Psych: no depression  Endocrine: no polyuria, polydipsia  Hem/lymph: no swelling  Osteoporosis: no fractures    ALL OTHER SYSTEMS REVIEWED AND NEGATIVE    PHYSICAL EXAM:  VITALS: T(C): 37 (08-13-24 @ 15:50)  T(F): 98.6 (08-13-24 @ 15:50), Max: 98.6 (08-13-24 @ 15:50)  HR: 82 (08-13-24 @ 15:50) (56 - 86)  BP: 152/77 (08-13-24 @ 15:50) (117/85 - 192/92)  RR:  (18 - 27)  SpO2:  (94% - 99%)  Wt(kg): --  GENERAL: NAD, well-groomed, well-developed  EYES: No proptosis, extraocular movements intact,  no lid lag, anicteric  HEENT:  Atraumatic, Normocephalic, moist mucous membranes  THYROID: Normal size, no palpable nodules, no thyromegaly  RESPIRATORY: Clear to auscultation bilaterally; No rales, rhonchi, wheezing, or rubs  CARDIOVASCULAR: Regular rate and rhythm; No murmurs; no peripheral edema  GI: Soft, nontender, non distended, normal bowel sounds  SKIN: Dry, intact, No rashes or lesions  EXTREMITIES: No foot ulcers, distal pedal pulses intact bilaterally  NEURO: sensation intact, no tremors  PSYCH: reactive affect, euthymic mood  CUSHING'S SIGNS: no striae or visible bruising                              10.3   7.20  )-----------( 231      ( 12 Aug 2024 23:17 )             34.2       08-13    141  |  106  |  22  ----------------------------<  62<L>  3.5   |  22  |  0.87    eGFR: 66    Ca    9.6      08-13    TPro  7.0  /  Alb  3.9  /  TBili  <0.1<L>  /  DBili  x   /  AST  22  /  ALT  25  /  AlkPhos  121<H>  08-12      Thyroid Function Tests:      08-13 Chol 199 Direct LDL -- LDL calculated 99 HDL 83 Trig 98    Radiology:

## 2024-08-13 NOTE — ED CDU PROVIDER INITIAL DAY NOTE - ATTENDING APP SHARED VISIT CONTRIBUTION OF CARE
Emergency Medicine Attending MD Elena: :  I have personally performed a face to face diagnostic evaluation on this patient with the PA.  I have reviewed the ACP note and agree with the history, exam, and plan of care, except as noted.  Patient dispositioned to the CDU by Dr. Mcguire.     History and Exam by me shows a 84-year-old female, sent to the ED for endocrine evaluation of hyperglycemia in the context of prednisone use.  Patient has a medical history of rheumatoid arthritis; was having a flare last month and was prescribed 7.5 mg prednisone daily.  Patient states that her arthralgias have significantly improved however she developed polyuria polydipsia and came to the ED yesterday, at which time her fingerstick glucose was found to be greater than 600.  Patient had no significant anion gap.  Serum bicarbonate 22.  pH 7.35.  Blood glucose is since improved.    Patient incidentally diagnosed with COVID in the ED; endorses 2 to 3 days of nasal congestion and sore throat.    VS: wnl  Gen: Well appearing elderly female in NAD  Head: NC/AT  Neck: trachea midline  Resp:  No distress  CV: RRR, no RMG  Abd: nondistended  Ext: no deformities  Neuro:  A&Ox4 appears non focal  Skin:  Warm and dry as visualized  Psych: appropriate    Medical Decision Making / Differential Diagnosis:  HPI consistent with corticosteroid induced hyperglycemia without significant metabolic derangements.  Blood sugars have since improved while in the ED.    Patient has a follow-up visit with her rheumatologist tomorrow, but will need to reschedule on account of the new COVID diagnosis.  We will reach out to her rheumatologist to discuss a tapered dose of prednisone.    Patient will likely need a short course of antidiabetic medication to control her blood sugar while she tapers off of the prednisone. I was the supervising attending. I have independently seen face-to-face and examined the patient in conjunction with the JOANNE. I have reviewed the history and physical and discussed the MDM with the JOANNE. I agree with the assessment and plan as presented above and in the ED Provider Note. -Evette Mcguire MD (Attending)

## 2024-08-13 NOTE — CONSULT NOTE ADULT - ASSESSMENT
A/P: 84-year-old female, history of type 2 diabetes, hypothyroidism, rheumatoid arthritis and Sjogren's syndrome, HTN, cirrhosis, sent by PMD for hyperglycemia. Patient is high risk with high level decision making due to uncontrolled diabetes with BG >600 which places patient at high risk for cardiovascular and cerebrovascular events. Patient with lability of glucose requiring close monitoring and insulin adjustments.  Endocrinology was consulted for management of diabetes mellitus.    #Type 2 Diabetes Mellitus  - HbA1c 8.9%    ; home regimen: none. Patient was previously on Ozempic and MFM in 2020, stopped taking it due to sugars improving.   BG now elevated likely in the setting of prednisone since July   BG on admission 603, AG 14, bicarb 22- patient was not in DKA  - currently on Prednisone 7.5mg daily with plans for patient to cut down to 5mg after discharge   - Diet is also high in sugars/carbs which is likely contributing to elevation in blood sugars   -egfr: 66    Plan:   - Recommend 10 units of lantus QHS  - Recommend 3 units of Admelog TIDQAC  - Recommend low  Admelog correction scale TIDQAC and QHS  - Please check FSG before meals and QHS, or q6h while NPO  - Inpatient glucose goal 100-180   - Please keep patient on a diabetic, carb controlled diet   - hypoglycemia orderset prn    - Discharge planning: Recommend at discharge:    1. MFM 500mg BID   2. Tradjenta 5mg daily or Januvia 100mg daily  3. Please ensure patient has all supplies to check blood sugars at home- discussed blood sugar goals at home. Needs to check 2-3 times a day  4. Extensively counseled on diet modifications in the setting of hyperglycemia given high carb diet   4. Follow up with PCP in 1 week.     #Hypertension  - BP goal <130/80  - Management as per primary team    #HLD  - Goal LDL <70 in the setting of diabetes  - Please check fasting lipid panel if not checked recently     Spoke with primary team regarding plan.     Viki Sawant,   Attending Physician   Department of Endocrinology, Diabetes and Metabolism     If before 9AM or after 5PM, or on weekends/holidays, please call the Endocrine answering service for assistance ([phone]).  For nonurgent matters, please email [email or web address] for assistance.     Please note that a different provider may be following this patient each day.

## 2024-08-13 NOTE — ED CDU PROVIDER DISPOSITION NOTE - CLINICAL COURSE
84-year-old female, history of type 2 diabetes, hypothyroidism, rheumatoid arthritis and Sjogren's syndrome, HTN, cirrhosis, sent by PMD for hyperglycemia.  Patient endorses that she was taken off her diabetes medications since an admission in 3/2020 for, has had progressive polyuria and polydipsia over the last few weeks with some episodes of urinary incontinence over the last few days.  Otherwise denies fevers or chills, chest pain, abdominal pain, nausea vomiting, diarrhea, dysuria, hematuria.  Was started on prednisone on 7/19/2024 by her rheumatologist.   In ED, patient Hyperglycemic to >600 on multiple FSG, Chart reviewed including information from prior admission in 3/2024.  Had A1c below 6 and was DC'd off Ozempic. Pt received 1L NS IVPB and 10 units subcut Admelog. Repeat FSG improved. No evidence of DKA/HHS on labs. COVID detected. Pt sent to CDU fo 84-year-old female, history of type 2 diabetes, hypothyroidism, rheumatoid arthritis and Sjogren's syndrome, HTN, cirrhosis, sent by PMD for hyperglycemia.  Patient endorses that she was taken off her diabetes medications since an admission in 3/2020 for, has had progressive polyuria and polydipsia over the last few weeks with some episodes of urinary incontinence over the last few days.  Otherwise denies fevers or chills, chest pain, abdominal pain, nausea vomiting, diarrhea, dysuria, hematuria.  Was started on prednisone on 7/19/2024 by her rheumatologist.   In ED, patient Hyperglycemic to >600 on multiple FSG, Chart reviewed including information from prior admission in 3/2024.  Had A1c below 6 and was DC'd off Ozempic. Pt received 1L NS IVPB and 10 units subcut Admelog. Repeat FSG improved. No evidence of DKA/HHS on labs. COVID detected. Pt sent to CDU for FS monitoring, Endo evaluation.  in Cdu, pt did well, glucose improved. pt seen by Endo- recommending metformin and Tradjenta and monitoring of FS at home. We got in touch with her Rheumatologist and prednisone dose lowered and patient to f/up outpatient to continue taper.

## 2024-08-13 NOTE — ED CDU PROVIDER INITIAL DAY NOTE - PROGRESS NOTE DETAILS
Email sent to Endocrine Emergency Medicine Attending MD Elena: :  I have personally performed a face to face diagnostic evaluation on this patient with the PA.  I have reviewed the ACP note and agree with the history, exam, and plan of care, except as noted.  Patient dispositioned to the CDU by Dr. Mcguire.     History and Exam by me shows a 84-year-old female, sent to the ED for endocrine evaluation of hyperglycemia in the context of prednisone use.  Patient has a medical history of rheumatoid arthritis; was having a flare last month and was prescribed 7.5 mg prednisone daily.  Patient states that her arthralgias have significantly improved however she developed polyuria polydipsia and came to the ED yesterday, at which time her fingerstick glucose was found to be greater than 600.  Patient had no significant anion gap.  Serum bicarbonate 22.  pH 7.35.  Blood glucose is since improved.    Patient incidentally diagnosed with COVID in the ED; endorses 2 to 3 days of nasal congestion and sore throat.    VS: wnl  Gen: Well appearing elderly female in NAD  Head: NC/AT  Neck: trachea midline  Resp:  No distress  CV: RRR, no RMG  Abd: nondistended  Ext: no deformities  Neuro:  A&Ox4 appears non focal  Skin:  Warm and dry as visualized  Psych: appropriate    Medical Decision Making / Differential Diagnosis:  HPI consistent with corticosteroid induced hyperglycemia without significant metabolic derangements.  Blood sugars have since improved while in the ED.    Patient has a follow-up visit with her rheumatologist tomorrow, but will need to reschedule on account of the new COVID diagnosis.  We will reach out to her rheumatologist to discuss a tapered dose of prednisone.    Patient will likely need a short course of antidiabetic medication to control her blood sugar while she tapers off of the prednisone. CDU NOTE KIZZY Nieto: pt resting comfortably, feels well without complaint, just mild nasal congestion no other symptoms. NAD recent VSS. FS improved.   as per Dr. Elena, put call out to pt's Rheumatologist Dr. Mitch Lombardi, awaiting call back.  Also, awaiting Endocrinology evaluation. CDU NOTE KIZZY Nieto: pt seen by Endo Attending Dr. Sawant- verbal recommendations of Tradjenta 5mg daily and Metformin 500mg 2x/day. pt already has glucometer with supplies- I confirmed.   as per Dr. Elena, pt stable for d/c home. CDU NOTE KIZZY Nieto: spoke with Dr. Harjit black to decrease prednisone to 5mg daily as long as patient's joint pain improving and patient to f/up in 10 days in office and at that point if improved then will decrease dosage again. Will need total 3 weeks of prednisone taper.   pt does report joint pain has improved, pt comfortable with this plan.

## 2024-08-14 ENCOUNTER — APPOINTMENT (OUTPATIENT)
Dept: OPHTHALMOLOGY | Facility: CLINIC | Age: 84
End: 2024-08-14

## 2024-08-14 LAB
CULTURE RESULTS: SIGNIFICANT CHANGE UP
SPECIMEN SOURCE: SIGNIFICANT CHANGE UP

## 2024-08-15 ENCOUNTER — APPOINTMENT (OUTPATIENT)
Dept: RHEUMATOLOGY | Facility: CLINIC | Age: 84
End: 2024-08-15

## 2024-09-01 ENCOUNTER — NON-APPOINTMENT (OUTPATIENT)
Age: 84
End: 2024-09-01

## 2024-09-20 ENCOUNTER — APPOINTMENT (OUTPATIENT)
Dept: OPHTHALMOLOGY | Facility: CLINIC | Age: 84
End: 2024-09-20
Payer: MEDICARE

## 2024-09-20 ENCOUNTER — APPOINTMENT (OUTPATIENT)
Dept: RHEUMATOLOGY | Facility: CLINIC | Age: 84
End: 2024-09-20
Payer: MEDICARE

## 2024-09-20 ENCOUNTER — NON-APPOINTMENT (OUTPATIENT)
Age: 84
End: 2024-09-20

## 2024-09-20 VITALS
DIASTOLIC BLOOD PRESSURE: 78 MMHG | HEART RATE: 79 BPM | RESPIRATION RATE: 16 BRPM | OXYGEN SATURATION: 97 % | HEIGHT: 64 IN | BODY MASS INDEX: 23.9 KG/M2 | WEIGHT: 140 LBS | SYSTOLIC BLOOD PRESSURE: 168 MMHG

## 2024-09-20 DIAGNOSIS — Z79.899 OTHER LONG TERM (CURRENT) DRUG THERAPY: ICD-10-CM

## 2024-09-20 DIAGNOSIS — M06.9 RHEUMATOID ARTHRITIS, UNSPECIFIED: ICD-10-CM

## 2024-09-20 PROCEDURE — 92014 COMPRE OPH EXAM EST PT 1/>: CPT

## 2024-09-20 PROCEDURE — G2211 COMPLEX E/M VISIT ADD ON: CPT

## 2024-09-20 PROCEDURE — 90662 IIV NO PRSV INCREASED AG IM: CPT

## 2024-09-20 PROCEDURE — 99214 OFFICE O/P EST MOD 30 MIN: CPT

## 2024-09-20 PROCEDURE — 92134 CPTRZ OPH DX IMG PST SGM RTA: CPT

## 2024-09-20 PROCEDURE — G0008: CPT

## 2024-09-22 NOTE — HISTORY OF PRESENT ILLNESS
[de-identified] : Last seen in July, 2024 [FreeTextEntry1] :  Interval history: -----------------------   joint pain and stiffness in fingers, shoulders, knees improved, but has right wrist pain on Plaquenil

## 2024-09-22 NOTE — ASSESSMENT
[FreeTextEntry1] : Rheumatoid arthritis - worsening of activity/ active synovitis DDD/ faucet arthropathy, s/p spinal sx Fibromyalgia - worsening off Duloxetine knee OA, s/p TKR 11/6/17 TB screen in May2022 - neg  - labs  - continue Plaquenil 200 mg / ophthalmology eval yearly - continue Cymbalta 90 mg a day - hands sx follow-up for. poss right wrist c/steroid injection - if no improvement - may need to add Orencia - flu vaccine admin    RTO 4 -6 weeks or earlier if needed.

## 2024-09-22 NOTE — PHYSICAL EXAM
[General Appearance - Alert] : alert [General Appearance - In No Acute Distress] : in no acute distress [General Appearance - Well Nourished] : well nourished [General Appearance - Well Developed] : well developed [Sclera] : the sclera and conjunctiva were normal [Outer Ear] : the ears and nose were normal in appearance [Nasal Cavity] : the nasal mucosa and septum were normal [Respiration, Rhythm And Depth] : normal respiratory rhythm and effort [Auscultation Breath Sounds / Voice Sounds] : lungs were clear to auscultation bilaterally [Heart Sounds] : normal S1 and S2 [Heart Sounds Gallop] : no gallops [Murmurs] : no murmurs [Edema] : there was no peripheral edema [Bowel Sounds] : normal bowel sounds [Abdomen Soft] : soft [Abdomen Tenderness] : non-tender [] : no rash [No Focal Deficits] : no focal deficits [Oriented To Time, Place, And Person] : oriented to person, place, and time [Impaired Insight] : insight and judgment were intact [FreeTextEntry1] : swelling and tenderness of right wrist

## 2024-09-22 NOTE — HISTORY OF PRESENT ILLNESS
[de-identified] : Last seen in July, 2024 [FreeTextEntry1] :  Interval history: -----------------------   joint pain and stiffness in fingers, shoulders, knees improved, but has right wrist pain on Plaquenil

## 2024-10-05 ENCOUNTER — APPOINTMENT (OUTPATIENT)
Dept: ULTRASOUND IMAGING | Facility: IMAGING CENTER | Age: 84
End: 2024-10-05
Payer: MEDICARE

## 2024-10-05 ENCOUNTER — OUTPATIENT (OUTPATIENT)
Dept: OUTPATIENT SERVICES | Facility: HOSPITAL | Age: 84
LOS: 1 days | End: 2024-10-05
Payer: MEDICARE

## 2024-10-05 DIAGNOSIS — Z98.89 OTHER SPECIFIED POSTPROCEDURAL STATES: Chronic | ICD-10-CM

## 2024-10-05 DIAGNOSIS — M54.9 DORSALGIA, UNSPECIFIED: ICD-10-CM

## 2024-10-05 DIAGNOSIS — H26.9 UNSPECIFIED CATARACT: Chronic | ICD-10-CM

## 2024-10-05 PROCEDURE — 76775 US EXAM ABDO BACK WALL LIM: CPT | Mod: 26

## 2024-11-20 PROCEDURE — 76775 US EXAM ABDO BACK WALL LIM: CPT

## 2024-12-03 NOTE — PATIENT PROFILE ADULT - FUNCTIONAL ASSESSMENT - BASIC MOBILITY 3.
[Lower back] : lower back [6] : 6 [Household chores] : household chores [Leisure] : leisure [Sleep] : sleep [Social interactions] : social interactions [Nothing helps with pain getting better] : Nothing helps with pain getting better [Sitting] : sitting [Standing] : standing [Walking] : walking [Bending forward] : bending forward [10] : 10 [Radiating] : radiating [FreeTextEntry1] : LT L2-L5 MBB-11/15/2024  LT L2-L5 MBB- 10/18/2024- pt is following up  states that he is still having pain  [] : no [de-identified] : 08/08/2023 4 = No assist / stand by assistance

## 2024-12-04 ENCOUNTER — APPOINTMENT (OUTPATIENT)
Dept: RHEUMATOLOGY | Facility: CLINIC | Age: 84
End: 2024-12-04
Payer: MEDICARE

## 2024-12-04 VITALS
HEIGHT: 64 IN | HEART RATE: 101 BPM | DIASTOLIC BLOOD PRESSURE: 88 MMHG | SYSTOLIC BLOOD PRESSURE: 159 MMHG | RESPIRATION RATE: 16 BRPM | OXYGEN SATURATION: 96 % | WEIGHT: 144 LBS | BODY MASS INDEX: 24.59 KG/M2

## 2024-12-04 PROCEDURE — 99214 OFFICE O/P EST MOD 30 MIN: CPT

## 2024-12-05 DIAGNOSIS — M06.9 RHEUMATOID ARTHRITIS, UNSPECIFIED: ICD-10-CM

## 2024-12-05 LAB
ALBUMIN SERPL ELPH-MCNC: 4.1 G/DL
ALP BLD-CCNC: 121 U/L
ALT SERPL-CCNC: 12 U/L
ANION GAP SERPL CALC-SCNC: 12 MMOL/L
AST SERPL-CCNC: 20 U/L
BASOPHILS # BLD AUTO: 0.1 K/UL
BASOPHILS NFR BLD AUTO: 1.1 %
BILIRUB SERPL-MCNC: 0.2 MG/DL
BUN SERPL-MCNC: 33 MG/DL
CALCIUM SERPL-MCNC: 10.1 MG/DL
CHLORIDE SERPL-SCNC: 102 MMOL/L
CO2 SERPL-SCNC: 26 MMOL/L
CREAT SERPL-MCNC: 1.12 MG/DL
CRP SERPL-MCNC: 7 MG/L
EGFR: 49 ML/MIN/1.73M2
EOSINOPHIL # BLD AUTO: 0.5 K/UL
EOSINOPHIL NFR BLD AUTO: 5.6 %
ERYTHROCYTE [SEDIMENTATION RATE] IN BLOOD BY WESTERGREN METHOD: 41 MM/HR
GLUCOSE SERPL-MCNC: 216 MG/DL
HCT VFR BLD CALC: 34.5 %
HGB BLD-MCNC: 10.1 G/DL
IMM GRANULOCYTES NFR BLD AUTO: 0.3 %
LYMPHOCYTES # BLD AUTO: 2.7 K/UL
LYMPHOCYTES NFR BLD AUTO: 30.4 %
MAN DIFF?: NORMAL
MCHC RBC-ENTMCNC: 26.2 PG
MCHC RBC-ENTMCNC: 29.3 G/DL
MCV RBC AUTO: 89.4 FL
MONOCYTES # BLD AUTO: 0.71 K/UL
MONOCYTES NFR BLD AUTO: 8 %
NEUTROPHILS # BLD AUTO: 4.85 K/UL
NEUTROPHILS NFR BLD AUTO: 54.6 %
PLATELET # BLD AUTO: 347 K/UL
POTASSIUM SERPL-SCNC: 4.3 MMOL/L
PROT SERPL-MCNC: 6.8 G/DL
RBC # BLD: 3.86 M/UL
RBC # FLD: 15.6 %
SODIUM SERPL-SCNC: 140 MMOL/L
WBC # FLD AUTO: 8.89 K/UL

## 2024-12-05 RX ORDER — ABATACEPT 250 MG/15ML
250 INJECTION, POWDER, LYOPHILIZED, FOR SOLUTION INTRAVENOUS
Refills: 0 | Status: ACTIVE | OUTPATIENT
Start: 2024-12-05 | End: 1900-01-01

## 2024-12-05 RX ORDER — ABATACEPT 125 MG/ML
125 INJECTION, SOLUTION SUBCUTANEOUS
Qty: 12 | Refills: 1 | Status: DISCONTINUED | COMMUNITY
Start: 2024-12-04 | End: 2024-12-05

## 2024-12-13 ENCOUNTER — APPOINTMENT (OUTPATIENT)
Dept: RHEUMATOLOGY | Facility: CLINIC | Age: 84
End: 2024-12-13
Payer: MEDICARE

## 2024-12-13 ENCOUNTER — NON-APPOINTMENT (OUTPATIENT)
Age: 84
End: 2024-12-13

## 2024-12-13 VITALS
TEMPERATURE: 97.7 F | SYSTOLIC BLOOD PRESSURE: 154 MMHG | RESPIRATION RATE: 16 BRPM | DIASTOLIC BLOOD PRESSURE: 84 MMHG | OXYGEN SATURATION: 96 % | HEART RATE: 79 BPM

## 2024-12-13 VITALS — BODY MASS INDEX: 24.03 KG/M2 | WEIGHT: 140 LBS

## 2024-12-13 VITALS — DIASTOLIC BLOOD PRESSURE: 69 MMHG | HEART RATE: 69 BPM | SYSTOLIC BLOOD PRESSURE: 152 MMHG | OXYGEN SATURATION: 97 %

## 2024-12-13 LAB — HYDROXYCHLOROQUINE CONCENTRATION: 150.7 NG/ML

## 2024-12-13 PROCEDURE — 96365 THER/PROPH/DIAG IV INF INIT: CPT

## 2024-12-13 RX ORDER — ABATACEPT 250 MG/15ML
250 INJECTION, POWDER, LYOPHILIZED, FOR SOLUTION INTRAVENOUS
Qty: 0 | Refills: 0 | Status: COMPLETED
Start: 2024-12-05

## 2024-12-14 LAB
ALBUMIN SERPL ELPH-MCNC: 3.8 G/DL
ALP BLD-CCNC: 118 U/L
ALT SERPL-CCNC: 12 U/L
ANION GAP SERPL CALC-SCNC: 17 MMOL/L
AST SERPL-CCNC: 16 U/L
BASOPHILS # BLD AUTO: 0.08 K/UL
BASOPHILS NFR BLD AUTO: 1.1 %
BILIRUB SERPL-MCNC: 0.2 MG/DL
BUN SERPL-MCNC: 28 MG/DL
CALCIUM SERPL-MCNC: 9.7 MG/DL
CHLORIDE SERPL-SCNC: 104 MMOL/L
CO2 SERPL-SCNC: 23 MMOL/L
CREAT SERPL-MCNC: 1.02 MG/DL
CRP SERPL-MCNC: 8 MG/L
EGFR: 55 ML/MIN/1.73M2
EOSINOPHIL # BLD AUTO: 0.52 K/UL
EOSINOPHIL NFR BLD AUTO: 6.9 %
ERYTHROCYTE [SEDIMENTATION RATE] IN BLOOD BY WESTERGREN METHOD: 61 MM/HR
GLUCOSE SERPL-MCNC: 113 MG/DL
HCT VFR BLD CALC: 32 %
HGB BLD-MCNC: 9.2 G/DL
IMM GRANULOCYTES NFR BLD AUTO: 0.3 %
LYMPHOCYTES # BLD AUTO: 2.13 K/UL
LYMPHOCYTES NFR BLD AUTO: 28.1 %
MAN DIFF?: NORMAL
MCHC RBC-ENTMCNC: 26.3 PG
MCHC RBC-ENTMCNC: 28.8 G/DL
MCV RBC AUTO: 91.4 FL
MONOCYTES # BLD AUTO: 0.54 K/UL
MONOCYTES NFR BLD AUTO: 7.1 %
NEUTROPHILS # BLD AUTO: 4.28 K/UL
NEUTROPHILS NFR BLD AUTO: 56.5 %
PLATELET # BLD AUTO: 280 K/UL
POTASSIUM SERPL-SCNC: 5.1 MMOL/L
PROT SERPL-MCNC: 6.6 G/DL
RBC # BLD: 3.5 M/UL
RBC # FLD: 15.7 %
SODIUM SERPL-SCNC: 143 MMOL/L
WBC # FLD AUTO: 7.57 K/UL

## 2024-12-27 ENCOUNTER — APPOINTMENT (OUTPATIENT)
Dept: RHEUMATOLOGY | Facility: CLINIC | Age: 84
End: 2024-12-27
Payer: MEDICARE

## 2024-12-27 VITALS
SYSTOLIC BLOOD PRESSURE: 151 MMHG | RESPIRATION RATE: 16 BRPM | DIASTOLIC BLOOD PRESSURE: 81 MMHG | HEART RATE: 76 BPM | TEMPERATURE: 97.3 F | OXYGEN SATURATION: 97 %

## 2024-12-27 VITALS — OXYGEN SATURATION: 98 % | DIASTOLIC BLOOD PRESSURE: 88 MMHG | SYSTOLIC BLOOD PRESSURE: 156 MMHG | HEART RATE: 74 BPM

## 2024-12-27 PROCEDURE — 96365 THER/PROPH/DIAG IV INF INIT: CPT

## 2024-12-27 RX ORDER — ABATACEPT 250 MG/15ML
250 INJECTION, POWDER, LYOPHILIZED, FOR SOLUTION INTRAVENOUS
Qty: 0 | Refills: 0 | Status: COMPLETED
Start: 2024-12-05

## 2025-01-13 ENCOUNTER — APPOINTMENT (OUTPATIENT)
Dept: RHEUMATOLOGY | Facility: CLINIC | Age: 85
End: 2025-01-13
Payer: MEDICARE

## 2025-01-13 VITALS
SYSTOLIC BLOOD PRESSURE: 161 MMHG | TEMPERATURE: 97.5 F | DIASTOLIC BLOOD PRESSURE: 77 MMHG | OXYGEN SATURATION: 96 % | RESPIRATION RATE: 16 BRPM | HEART RATE: 70 BPM

## 2025-01-13 VITALS — HEART RATE: 71 BPM | DIASTOLIC BLOOD PRESSURE: 89 MMHG | SYSTOLIC BLOOD PRESSURE: 148 MMHG

## 2025-01-13 PROCEDURE — 96365 THER/PROPH/DIAG IV INF INIT: CPT

## 2025-01-13 PROCEDURE — 36415 COLL VENOUS BLD VENIPUNCTURE: CPT

## 2025-01-13 RX ORDER — ABATACEPT 250 MG/15ML
250 INJECTION, POWDER, LYOPHILIZED, FOR SOLUTION INTRAVENOUS
Qty: 0 | Refills: 0 | Status: COMPLETED
Start: 2024-12-05

## 2025-01-14 LAB
ALBUMIN SERPL ELPH-MCNC: 4.1 G/DL
ALP BLD-CCNC: 106 U/L
ALT SERPL-CCNC: 11 U/L
ANION GAP SERPL CALC-SCNC: 18 MMOL/L
AST SERPL-CCNC: 21 U/L
BASOPHILS # BLD AUTO: 0.07 K/UL
BASOPHILS NFR BLD AUTO: 0.7 %
BILIRUB SERPL-MCNC: 0.2 MG/DL
BUN SERPL-MCNC: 33 MG/DL
CALCIUM SERPL-MCNC: 10.1 MG/DL
CHLORIDE SERPL-SCNC: 99 MMOL/L
CO2 SERPL-SCNC: 21 MMOL/L
CREAT SERPL-MCNC: 1.13 MG/DL
CRP SERPL-MCNC: 8 MG/L
EGFR: 48 ML/MIN/1.73M2
EOSINOPHIL # BLD AUTO: 0.04 K/UL
EOSINOPHIL NFR BLD AUTO: 0.4 %
ERYTHROCYTE [SEDIMENTATION RATE] IN BLOOD BY WESTERGREN METHOD: 73 MM/HR
GLUCOSE SERPL-MCNC: 131 MG/DL
HCT VFR BLD CALC: 35.2 %
HGB BLD-MCNC: 10.4 G/DL
IMM GRANULOCYTES NFR BLD AUTO: 0.4 %
LYMPHOCYTES # BLD AUTO: 1.2 K/UL
LYMPHOCYTES NFR BLD AUTO: 11.8 %
MAN DIFF?: NORMAL
MCHC RBC-ENTMCNC: 25.6 PG
MCHC RBC-ENTMCNC: 29.5 G/DL
MCV RBC AUTO: 86.7 FL
MONOCYTES # BLD AUTO: 0.08 K/UL
MONOCYTES NFR BLD AUTO: 0.8 %
NEUTROPHILS # BLD AUTO: 8.71 K/UL
NEUTROPHILS NFR BLD AUTO: 85.9 %
PLATELET # BLD AUTO: 375 K/UL
POTASSIUM SERPL-SCNC: 5.1 MMOL/L
PROT SERPL-MCNC: 7.1 G/DL
RBC # BLD: 4.06 M/UL
RBC # FLD: 16.2 %
SODIUM SERPL-SCNC: 138 MMOL/L
WBC # FLD AUTO: 10.14 K/UL

## 2025-02-10 ENCOUNTER — APPOINTMENT (OUTPATIENT)
Dept: RHEUMATOLOGY | Facility: CLINIC | Age: 85
End: 2025-02-10
Payer: MEDICARE

## 2025-02-10 VITALS
DIASTOLIC BLOOD PRESSURE: 81 MMHG | HEART RATE: 89 BPM | OXYGEN SATURATION: 96 % | SYSTOLIC BLOOD PRESSURE: 148 MMHG | TEMPERATURE: 97.9 F

## 2025-02-10 VITALS — DIASTOLIC BLOOD PRESSURE: 77 MMHG | HEART RATE: 83 BPM | SYSTOLIC BLOOD PRESSURE: 136 MMHG

## 2025-02-10 LAB
ALBUMIN SERPL ELPH-MCNC: 3.8 G/DL
ALP BLD-CCNC: 113 U/L
ALT SERPL-CCNC: 10 U/L
ANION GAP SERPL CALC-SCNC: 14 MMOL/L
AST SERPL-CCNC: 18 U/L
BILIRUB SERPL-MCNC: 0.2 MG/DL
BUN SERPL-MCNC: 28 MG/DL
CALCIUM SERPL-MCNC: 10 MG/DL
CHLORIDE SERPL-SCNC: 102 MMOL/L
CO2 SERPL-SCNC: 25 MMOL/L
CREAT SERPL-MCNC: 0.9 MG/DL
CRP SERPL-MCNC: 4 MG/L
EGFR: 63 ML/MIN/1.73M2
GLUCOSE SERPL-MCNC: 175 MG/DL
POTASSIUM SERPL-SCNC: 4.5 MMOL/L
PROT SERPL-MCNC: 6.3 G/DL
SODIUM SERPL-SCNC: 141 MMOL/L

## 2025-02-10 PROCEDURE — 36415 COLL VENOUS BLD VENIPUNCTURE: CPT

## 2025-02-10 PROCEDURE — 96365 THER/PROPH/DIAG IV INF INIT: CPT

## 2025-02-10 RX ORDER — ABATACEPT 250 MG/15ML
250 INJECTION, POWDER, LYOPHILIZED, FOR SOLUTION INTRAVENOUS
Qty: 0 | Refills: 0 | Status: COMPLETED
Start: 2024-12-05

## 2025-02-11 LAB
BASOPHILS # BLD AUTO: 0.05 K/UL
BASOPHILS NFR BLD AUTO: 0.7 %
EOSINOPHIL # BLD AUTO: 0.35 K/UL
EOSINOPHIL NFR BLD AUTO: 4.9 %
ERYTHROCYTE [SEDIMENTATION RATE] IN BLOOD BY WESTERGREN METHOD: 59 MM/HR
HCT VFR BLD CALC: 32.4 %
HGB BLD-MCNC: 9.4 G/DL
IMM GRANULOCYTES NFR BLD AUTO: 0.1 %
LYMPHOCYTES # BLD AUTO: 2.34 K/UL
LYMPHOCYTES NFR BLD AUTO: 32.8 %
MAN DIFF?: NORMAL
MCHC RBC-ENTMCNC: 25.6 PG
MCHC RBC-ENTMCNC: 29 G/DL
MCV RBC AUTO: 88.3 FL
MONOCYTES # BLD AUTO: 0.51 K/UL
MONOCYTES NFR BLD AUTO: 7.2 %
NEUTROPHILS # BLD AUTO: 3.87 K/UL
NEUTROPHILS NFR BLD AUTO: 54.3 %
PLATELET # BLD AUTO: 272 K/UL
RBC # BLD: 3.67 M/UL
RBC # FLD: 17 %
WBC # FLD AUTO: 7.13 K/UL

## 2025-02-14 ENCOUNTER — APPOINTMENT (OUTPATIENT)
Dept: RHEUMATOLOGY | Facility: CLINIC | Age: 85
End: 2025-02-14

## 2025-02-14 VITALS
HEIGHT: 64 IN | BODY MASS INDEX: 23.9 KG/M2 | SYSTOLIC BLOOD PRESSURE: 186 MMHG | WEIGHT: 140 LBS | DIASTOLIC BLOOD PRESSURE: 76 MMHG | HEART RATE: 71 BPM | OXYGEN SATURATION: 98 %

## 2025-02-14 DIAGNOSIS — M06.9 RHEUMATOID ARTHRITIS, UNSPECIFIED: ICD-10-CM

## 2025-02-14 DIAGNOSIS — Z79.899 OTHER LONG TERM (CURRENT) DRUG THERAPY: ICD-10-CM

## 2025-02-14 DIAGNOSIS — M79.7 FIBROMYALGIA: ICD-10-CM

## 2025-02-14 DIAGNOSIS — F32.A ANXIETY DISORDER, UNSPECIFIED: ICD-10-CM

## 2025-02-14 DIAGNOSIS — F41.9 ANXIETY DISORDER, UNSPECIFIED: ICD-10-CM

## 2025-02-14 PROCEDURE — 99214 OFFICE O/P EST MOD 30 MIN: CPT

## 2025-02-14 RX ORDER — DULOXETINE HYDROCHLORIDE 30 MG/1
30 CAPSULE, DELAYED RELEASE PELLETS ORAL
Qty: 30 | Refills: 3 | Status: ACTIVE | COMMUNITY
Start: 2025-02-14

## 2025-02-18 LAB
FERRITIN SERPL-MCNC: 18 NG/ML
IRON SATN MFR SERPL: 6 %
IRON SERPL-MCNC: 23 UG/DL
TIBC SERPL-MCNC: 401 UG/DL
UIBC SERPL-MCNC: 378 UG/DL

## 2025-03-10 ENCOUNTER — APPOINTMENT (OUTPATIENT)
Dept: RHEUMATOLOGY | Facility: CLINIC | Age: 85
End: 2025-03-10
Payer: MEDICARE

## 2025-03-10 VITALS
OXYGEN SATURATION: 99 % | HEART RATE: 87 BPM | DIASTOLIC BLOOD PRESSURE: 83 MMHG | SYSTOLIC BLOOD PRESSURE: 172 MMHG | RESPIRATION RATE: 16 BRPM | TEMPERATURE: 97.2 F

## 2025-03-10 VITALS — HEART RATE: 82 BPM | OXYGEN SATURATION: 97 % | SYSTOLIC BLOOD PRESSURE: 159 MMHG | DIASTOLIC BLOOD PRESSURE: 82 MMHG

## 2025-03-10 PROCEDURE — 36415 COLL VENOUS BLD VENIPUNCTURE: CPT

## 2025-03-10 PROCEDURE — 96365 THER/PROPH/DIAG IV INF INIT: CPT

## 2025-03-10 RX ORDER — ABATACEPT 250 MG/15ML
250 INJECTION, POWDER, LYOPHILIZED, FOR SOLUTION INTRAVENOUS
Qty: 0 | Refills: 0 | Status: COMPLETED
Start: 2024-12-05

## 2025-03-14 LAB
ALBUMIN SERPL ELPH-MCNC: 3.9 G/DL
ALP BLD-CCNC: 108 U/L
ALT SERPL-CCNC: 14 U/L
ANION GAP SERPL CALC-SCNC: 14 MMOL/L
AST SERPL-CCNC: 14 U/L
BASOPHILS # BLD AUTO: 0.07 K/UL
BASOPHILS NFR BLD AUTO: 0.7 %
BILIRUB SERPL-MCNC: <0.2 MG/DL
BUN SERPL-MCNC: 30 MG/DL
CALCIUM SERPL-MCNC: 9.5 MG/DL
CHLORIDE SERPL-SCNC: 104 MMOL/L
CO2 SERPL-SCNC: 25 MMOL/L
CREAT SERPL-MCNC: 0.99 MG/DL
CRP SERPL-MCNC: 5 MG/L
EGFRCR SERPLBLD CKD-EPI 2021: 57 ML/MIN/1.73M2
EOSINOPHIL # BLD AUTO: 0.22 K/UL
EOSINOPHIL NFR BLD AUTO: 2.3 %
ERYTHROCYTE [SEDIMENTATION RATE] IN BLOOD BY WESTERGREN METHOD: 97 MM/HR
GLUCOSE SERPL-MCNC: 228 MG/DL
HCT VFR BLD CALC: 32.5 %
HGB BLD-MCNC: 9.4 G/DL
IMM GRANULOCYTES NFR BLD AUTO: 0.2 %
LYMPHOCYTES # BLD AUTO: 2.12 K/UL
LYMPHOCYTES NFR BLD AUTO: 22.6 %
MAN DIFF?: NORMAL
MCHC RBC-ENTMCNC: 25 PG
MCHC RBC-ENTMCNC: 28.9 G/DL
MCV RBC AUTO: 86.4 FL
MONOCYTES # BLD AUTO: 0.64 K/UL
MONOCYTES NFR BLD AUTO: 6.8 %
NEUTROPHILS # BLD AUTO: 6.32 K/UL
NEUTROPHILS NFR BLD AUTO: 67.4 %
PLATELET # BLD AUTO: 286 K/UL
POTASSIUM SERPL-SCNC: 4.3 MMOL/L
PROT SERPL-MCNC: 6.5 G/DL
RBC # BLD: 3.76 M/UL
RBC # FLD: 17.2 %
SODIUM SERPL-SCNC: 143 MMOL/L
WBC # FLD AUTO: 9.39 K/UL

## 2025-03-19 ENCOUNTER — NON-APPOINTMENT (OUTPATIENT)
Age: 85
End: 2025-03-19

## 2025-03-19 ENCOUNTER — APPOINTMENT (OUTPATIENT)
Dept: OPHTHALMOLOGY | Facility: CLINIC | Age: 85
End: 2025-03-19
Payer: MEDICARE

## 2025-03-19 PROCEDURE — 92250 FUNDUS PHOTOGRAPHY W/I&R: CPT

## 2025-03-19 PROCEDURE — 68761 CLOSE TEAR DUCT OPENING: CPT | Mod: E2,E1,LT

## 2025-03-19 PROCEDURE — 92014 COMPRE OPH EXAM EST PT 1/>: CPT | Mod: 25

## 2025-04-10 ENCOUNTER — APPOINTMENT (OUTPATIENT)
Dept: RHEUMATOLOGY | Facility: CLINIC | Age: 85
End: 2025-04-10
Payer: MEDICARE

## 2025-04-10 VITALS
SYSTOLIC BLOOD PRESSURE: 162 MMHG | TEMPERATURE: 97 F | HEART RATE: 101 BPM | RESPIRATION RATE: 16 BRPM | OXYGEN SATURATION: 98 % | DIASTOLIC BLOOD PRESSURE: 74 MMHG

## 2025-04-10 VITALS — OXYGEN SATURATION: 99 % | SYSTOLIC BLOOD PRESSURE: 151 MMHG | HEART RATE: 85 BPM | DIASTOLIC BLOOD PRESSURE: 82 MMHG

## 2025-04-10 PROCEDURE — 96365 THER/PROPH/DIAG IV INF INIT: CPT

## 2025-04-10 PROCEDURE — 36415 COLL VENOUS BLD VENIPUNCTURE: CPT

## 2025-04-10 RX ORDER — ABATACEPT 250 MG/15ML
250 INJECTION, POWDER, LYOPHILIZED, FOR SOLUTION INTRAVENOUS
Qty: 0 | Refills: 0 | Status: COMPLETED
Start: 2024-12-05

## 2025-04-11 LAB
ALBUMIN SERPL ELPH-MCNC: 3.6 G/DL
ALP BLD-CCNC: 124 U/L
ALT SERPL-CCNC: 10 U/L
ANION GAP SERPL CALC-SCNC: 14 MMOL/L
AST SERPL-CCNC: 15 U/L
BASOPHILS # BLD AUTO: 0.09 K/UL
BASOPHILS NFR BLD AUTO: 1 %
BILIRUB SERPL-MCNC: 0.2 MG/DL
BUN SERPL-MCNC: 21 MG/DL
CALCIUM SERPL-MCNC: 9 MG/DL
CHLORIDE SERPL-SCNC: 105 MMOL/L
CO2 SERPL-SCNC: 23 MMOL/L
CREAT SERPL-MCNC: 0.86 MG/DL
CRP SERPL-MCNC: 6 MG/L
EGFRCR SERPLBLD CKD-EPI 2021: 67 ML/MIN/1.73M2
EOSINOPHIL # BLD AUTO: 0.35 K/UL
EOSINOPHIL NFR BLD AUTO: 4 %
ERYTHROCYTE [SEDIMENTATION RATE] IN BLOOD BY WESTERGREN METHOD: 80 MM/HR
GLUCOSE SERPL-MCNC: 142 MG/DL
HCT VFR BLD CALC: 31.5 %
HGB BLD-MCNC: 9 G/DL
IMM GRANULOCYTES NFR BLD AUTO: 0.2 %
LYMPHOCYTES # BLD AUTO: 2.68 K/UL
LYMPHOCYTES NFR BLD AUTO: 30.9 %
MAN DIFF?: NORMAL
MCHC RBC-ENTMCNC: 23.6 PG
MCHC RBC-ENTMCNC: 28.6 G/DL
MCV RBC AUTO: 82.5 FL
MONOCYTES # BLD AUTO: 0.65 K/UL
MONOCYTES NFR BLD AUTO: 7.5 %
NEUTROPHILS # BLD AUTO: 4.87 K/UL
NEUTROPHILS NFR BLD AUTO: 56.4 %
PLATELET # BLD AUTO: 284 K/UL
POTASSIUM SERPL-SCNC: 4 MMOL/L
PROT SERPL-MCNC: 6 G/DL
RBC # BLD: 3.82 M/UL
RBC # FLD: 17.2 %
SODIUM SERPL-SCNC: 142 MMOL/L
WBC # FLD AUTO: 8.66 K/UL

## 2025-04-15 ENCOUNTER — OUTPATIENT (OUTPATIENT)
Dept: OUTPATIENT SERVICES | Facility: HOSPITAL | Age: 85
LOS: 1 days | End: 2025-04-15
Payer: MEDICARE

## 2025-04-15 ENCOUNTER — APPOINTMENT (OUTPATIENT)
Dept: RADIOLOGY | Facility: IMAGING CENTER | Age: 85
End: 2025-04-15
Payer: MEDICARE

## 2025-04-15 DIAGNOSIS — H26.9 UNSPECIFIED CATARACT: Chronic | ICD-10-CM

## 2025-04-15 DIAGNOSIS — Z00.8 ENCOUNTER FOR OTHER GENERAL EXAMINATION: ICD-10-CM

## 2025-04-15 DIAGNOSIS — Z98.89 OTHER SPECIFIED POSTPROCEDURAL STATES: Chronic | ICD-10-CM

## 2025-04-15 PROCEDURE — 74019 RADEX ABDOMEN 2 VIEWS: CPT

## 2025-04-15 PROCEDURE — 74019 RADEX ABDOMEN 2 VIEWS: CPT | Mod: 26

## 2025-04-21 ENCOUNTER — EMERGENCY (EMERGENCY)
Facility: HOSPITAL | Age: 85
LOS: 1 days | End: 2025-04-21
Attending: EMERGENCY MEDICINE | Admitting: STUDENT IN AN ORGANIZED HEALTH CARE EDUCATION/TRAINING PROGRAM
Payer: MEDICARE

## 2025-04-21 VITALS
SYSTOLIC BLOOD PRESSURE: 167 MMHG | OXYGEN SATURATION: 97 % | TEMPERATURE: 98 F | DIASTOLIC BLOOD PRESSURE: 84 MMHG | HEIGHT: 63 IN | HEART RATE: 106 BPM | RESPIRATION RATE: 18 BRPM | WEIGHT: 145.06 LBS

## 2025-04-21 DIAGNOSIS — H26.9 UNSPECIFIED CATARACT: Chronic | ICD-10-CM

## 2025-04-21 DIAGNOSIS — Z98.89 OTHER SPECIFIED POSTPROCEDURAL STATES: Chronic | ICD-10-CM

## 2025-04-21 PROCEDURE — 93010 ELECTROCARDIOGRAM REPORT: CPT

## 2025-04-22 ENCOUNTER — RESULT REVIEW (OUTPATIENT)
Age: 85
End: 2025-04-22

## 2025-04-22 VITALS
RESPIRATION RATE: 17 BRPM | DIASTOLIC BLOOD PRESSURE: 75 MMHG | SYSTOLIC BLOOD PRESSURE: 138 MMHG | OXYGEN SATURATION: 100 % | TEMPERATURE: 98 F | HEART RATE: 82 BPM

## 2025-04-22 LAB
ADD ON TEST-SPECIMEN IN LAB: SIGNIFICANT CHANGE UP
ALBUMIN SERPL ELPH-MCNC: 3.5 G/DL — SIGNIFICANT CHANGE UP (ref 3.3–5)
ALP SERPL-CCNC: 99 U/L — SIGNIFICANT CHANGE UP (ref 40–120)
ALT FLD-CCNC: 12 U/L — SIGNIFICANT CHANGE UP (ref 4–33)
ANION GAP SERPL CALC-SCNC: 15 MMOL/L — HIGH (ref 7–14)
APTT BLD: 33.3 SEC — SIGNIFICANT CHANGE UP (ref 24.5–35.6)
AST SERPL-CCNC: 17 U/L — SIGNIFICANT CHANGE UP (ref 4–32)
BASOPHILS # BLD AUTO: 0.06 K/UL — SIGNIFICANT CHANGE UP (ref 0–0.2)
BASOPHILS NFR BLD AUTO: 0.5 % — SIGNIFICANT CHANGE UP (ref 0–2)
BILIRUB SERPL-MCNC: 0.3 MG/DL — SIGNIFICANT CHANGE UP (ref 0.2–1.2)
BUN SERPL-MCNC: 22 MG/DL — SIGNIFICANT CHANGE UP (ref 7–23)
CALCIUM SERPL-MCNC: 10.1 MG/DL — SIGNIFICANT CHANGE UP (ref 8.4–10.5)
CHLORIDE SERPL-SCNC: 103 MMOL/L — SIGNIFICANT CHANGE UP (ref 98–107)
CO2 SERPL-SCNC: 20 MMOL/L — LOW (ref 22–31)
CREAT SERPL-MCNC: 0.79 MG/DL — SIGNIFICANT CHANGE UP (ref 0.5–1.3)
EGFR: 74 ML/MIN/1.73M2 — SIGNIFICANT CHANGE UP
EGFR: 74 ML/MIN/1.73M2 — SIGNIFICANT CHANGE UP
EOSINOPHIL # BLD AUTO: 0.21 K/UL — SIGNIFICANT CHANGE UP (ref 0–0.5)
EOSINOPHIL NFR BLD AUTO: 1.8 % — SIGNIFICANT CHANGE UP (ref 0–6)
GLUCOSE SERPL-MCNC: 139 MG/DL — HIGH (ref 70–99)
HCT VFR BLD CALC: 31.7 % — LOW (ref 34.5–45)
HGB BLD-MCNC: 9.3 G/DL — LOW (ref 11.5–15.5)
IANC: 8.68 K/UL — HIGH (ref 1.8–7.4)
IMM GRANULOCYTES NFR BLD AUTO: 0.3 % — SIGNIFICANT CHANGE UP (ref 0–0.9)
INR BLD: 0.95 RATIO — SIGNIFICANT CHANGE UP (ref 0.85–1.16)
LYMPHOCYTES # BLD AUTO: 1.99 K/UL — SIGNIFICANT CHANGE UP (ref 1–3.3)
LYMPHOCYTES # BLD AUTO: 16.9 % — SIGNIFICANT CHANGE UP (ref 13–44)
MCHC RBC-ENTMCNC: 23.5 PG — LOW (ref 27–34)
MCHC RBC-ENTMCNC: 29.3 G/DL — LOW (ref 32–36)
MCV RBC AUTO: 80.1 FL — SIGNIFICANT CHANGE UP (ref 80–100)
MONOCYTES # BLD AUTO: 0.81 K/UL — SIGNIFICANT CHANGE UP (ref 0–0.9)
MONOCYTES NFR BLD AUTO: 6.9 % — SIGNIFICANT CHANGE UP (ref 2–14)
NEUTROPHILS # BLD AUTO: 8.68 K/UL — HIGH (ref 1.8–7.4)
NEUTROPHILS NFR BLD AUTO: 73.6 % — SIGNIFICANT CHANGE UP (ref 43–77)
NRBC # BLD AUTO: 0 K/UL — SIGNIFICANT CHANGE UP (ref 0–0)
NRBC # FLD: 0 K/UL — SIGNIFICANT CHANGE UP (ref 0–0)
NRBC BLD AUTO-RTO: 0 /100 WBCS — SIGNIFICANT CHANGE UP (ref 0–0)
PLATELET # BLD AUTO: 315 K/UL — SIGNIFICANT CHANGE UP (ref 150–400)
POTASSIUM SERPL-MCNC: 4.2 MMOL/L — SIGNIFICANT CHANGE UP (ref 3.5–5.3)
POTASSIUM SERPL-SCNC: 4.2 MMOL/L — SIGNIFICANT CHANGE UP (ref 3.5–5.3)
PROT SERPL-MCNC: 6.5 G/DL — SIGNIFICANT CHANGE UP (ref 6–8.3)
PROTHROM AB SERPL-ACNC: 11 SEC — SIGNIFICANT CHANGE UP (ref 9.9–13.4)
RBC # BLD: 3.96 M/UL — SIGNIFICANT CHANGE UP (ref 3.8–5.2)
RBC # FLD: 16.8 % — HIGH (ref 10.3–14.5)
SODIUM SERPL-SCNC: 138 MMOL/L — SIGNIFICANT CHANGE UP (ref 135–145)
TROPONIN T, HIGH SENSITIVITY RESULT: 20 NG/L — SIGNIFICANT CHANGE UP
TROPONIN T, HIGH SENSITIVITY RESULT: 22 NG/L — SIGNIFICANT CHANGE UP
TSH SERPL-MCNC: 0.9 UIU/ML — SIGNIFICANT CHANGE UP (ref 0.27–4.2)
WBC # BLD: 11.78 K/UL — HIGH (ref 3.8–10.5)
WBC # FLD AUTO: 11.78 K/UL — HIGH (ref 3.8–10.5)

## 2025-04-22 PROCEDURE — 78451 HT MUSCLE IMAGE SPECT SING: CPT | Mod: 26

## 2025-04-22 PROCEDURE — 93018 CV STRESS TEST I&R ONLY: CPT | Mod: GC

## 2025-04-22 PROCEDURE — 71275 CT ANGIOGRAPHY CHEST: CPT | Mod: 26

## 2025-04-22 PROCEDURE — 93016 CV STRESS TEST SUPVJ ONLY: CPT | Mod: GC

## 2025-04-22 PROCEDURE — 93306 TTE W/DOPPLER COMPLETE: CPT | Mod: 26

## 2025-04-22 PROCEDURE — 71046 X-RAY EXAM CHEST 2 VIEWS: CPT | Mod: 26

## 2025-04-22 PROCEDURE — 99284 EMERGENCY DEPT VISIT MOD MDM: CPT

## 2025-04-22 PROCEDURE — 99236 HOSP IP/OBS SAME DATE HI 85: CPT | Mod: FS

## 2025-04-22 RX ORDER — ACETAMINOPHEN 500 MG/5ML
975 LIQUID (ML) ORAL ONCE
Refills: 0 | Status: ACTIVE | OUTPATIENT
Start: 2025-04-22

## 2025-04-22 RX ORDER — B1/B2/B3/B5/B6/B12/VIT C/FOLIC 500-0.5 MG
1 TABLET ORAL DAILY
Refills: 0 | Status: ACTIVE | OUTPATIENT
Start: 2025-04-22 | End: 2026-03-21

## 2025-04-22 RX ORDER — SODIUM CHLORIDE 50 MG/G
1 OINTMENT OPHTHALMIC DAILY
Refills: 0 | Status: DISCONTINUED | OUTPATIENT
Start: 2025-04-22 | End: 2025-04-22

## 2025-04-22 RX ORDER — LOSARTAN POTASSIUM 100 MG/1
25 TABLET, FILM COATED ORAL DAILY
Refills: 0 | Status: ACTIVE | OUTPATIENT
Start: 2025-04-22 | End: 2026-03-21

## 2025-04-22 RX ORDER — DEXTROSE 50 % IN WATER 50 %
12.5 SYRINGE (ML) INTRAVENOUS ONCE
Refills: 0 | Status: ACTIVE | OUTPATIENT
Start: 2025-04-22

## 2025-04-22 RX ORDER — DEXTROSE 50 % IN WATER 50 %
15 SYRINGE (ML) INTRAVENOUS ONCE
Refills: 0 | Status: ACTIVE | OUTPATIENT
Start: 2025-04-22 | End: 2026-03-21

## 2025-04-22 RX ORDER — INSULIN LISPRO 100 U/ML
INJECTION, SOLUTION INTRAVENOUS; SUBCUTANEOUS
Refills: 0 | Status: ACTIVE | OUTPATIENT
Start: 2025-04-22 | End: 2026-03-21

## 2025-04-22 RX ORDER — GLUCAGON 3 MG/1
1 POWDER NASAL ONCE
Refills: 0 | Status: ACTIVE | OUTPATIENT
Start: 2025-04-22 | End: 2026-03-21

## 2025-04-22 RX ORDER — ASPIRIN 325 MG
324 TABLET ORAL ONCE
Refills: 0 | Status: DISCONTINUED | OUTPATIENT
Start: 2025-04-22 | End: 2025-04-22

## 2025-04-22 RX ORDER — LEVOTHYROXINE SODIUM 300 MCG
100 TABLET ORAL DAILY
Refills: 0 | Status: ACTIVE | OUTPATIENT
Start: 2025-04-22 | End: 2026-03-21

## 2025-04-22 RX ORDER — DEXTROSE 50 % IN WATER 50 %
25 SYRINGE (ML) INTRAVENOUS ONCE
Refills: 0 | Status: ACTIVE | OUTPATIENT
Start: 2025-04-22

## 2025-04-22 RX ORDER — DULOXETINE 20 MG/1
30 CAPSULE, DELAYED RELEASE ORAL DAILY
Refills: 0 | Status: DISCONTINUED | OUTPATIENT
Start: 2025-04-22 | End: 2025-04-22

## 2025-04-22 RX ORDER — DULOXETINE 20 MG/1
60 CAPSULE, DELAYED RELEASE ORAL DAILY
Refills: 0 | Status: DISCONTINUED | OUTPATIENT
Start: 2025-04-22 | End: 2025-04-22

## 2025-04-22 RX ORDER — ACETAMINOPHEN 500 MG/5ML
1000 LIQUID (ML) ORAL ONCE
Refills: 0 | Status: COMPLETED | OUTPATIENT
Start: 2025-04-22 | End: 2025-04-22

## 2025-04-22 RX ORDER — SODIUM CHLORIDE 9 G/1000ML
1000 INJECTION, SOLUTION INTRAVENOUS
Refills: 0 | Status: ACTIVE | OUTPATIENT
Start: 2025-04-22 | End: 2026-03-21

## 2025-04-22 RX ORDER — FOLIC ACID 1 MG/1
1 TABLET ORAL DAILY
Refills: 0 | Status: ACTIVE | OUTPATIENT
Start: 2025-04-22 | End: 2026-03-21

## 2025-04-22 RX ORDER — ASPIRIN 325 MG
81 TABLET ORAL ONCE
Refills: 0 | Status: COMPLETED | OUTPATIENT
Start: 2025-04-22 | End: 2025-04-22

## 2025-04-22 RX ADMIN — FOLIC ACID 1 MILLIGRAM(S): 1 TABLET ORAL at 14:08

## 2025-04-22 RX ADMIN — Medication 81 MILLIGRAM(S): at 01:07

## 2025-04-22 RX ADMIN — LOSARTAN POTASSIUM 25 MILLIGRAM(S): 100 TABLET, FILM COATED ORAL at 06:05

## 2025-04-22 RX ADMIN — Medication 40 MILLIGRAM(S): at 06:05

## 2025-04-22 RX ADMIN — Medication 1 TABLET(S): at 14:08

## 2025-04-22 RX ADMIN — Medication 400 MILLIGRAM(S): at 01:01

## 2025-04-22 RX ADMIN — Medication 100 MICROGRAM(S): at 06:05

## 2025-04-22 NOTE — ED PROVIDER NOTE - ATTENDING CONTRIBUTION TO CARE
85F h/o diabetes, HTN. HLD no prior cardiac history p/w sudden onset L sided chest pain, worse with inspiration that started at rest while playing cards this afternoon. Pain became worse this evening while watching TV so came to ED. EKG NSR w/o ischemia, noted to be mildly tachy to low 100s, no fever, no cough/sob/leg pain or leg swelling, no recent illness, no recent travel. Primary concern is for PE, ordered for labs and CT PE, will also place on tele monitoring and obtain troponin. SEE MDM

## 2025-04-22 NOTE — ED CDU PROVIDER DISPOSITION NOTE - NSFOLLOWUPINSTRUCTIONS_ED_ALL_ED_FT
Follow up with your Doctor in 1-2 days.    Follow up with cardiology Dr Khanna in 1-2 days.    Return to the ER for any persistent/worsening or new symptoms, chest pain, shortness of breath, palpitations, dizziness or any concerning symptoms.

## 2025-04-22 NOTE — ED CDU PROVIDER DISPOSITION NOTE - CARE PROVIDER_API CALL
Curt Khanna  Cardiovascular Disease  [address] [place] [address], [address]  [place] [address]  Phone: [phone]  Fax: [phone]  Follow Up Time:

## 2025-04-22 NOTE — ED PROVIDER NOTE - CLINICAL SUMMARY MEDICAL DECISION MAKING FREE TEXT BOX
SUDHA: 85F h/o diabetes, HTN. HLD no prior cardiac history p/w sudden onset L sided chest pain, worse with inspiration that started at rest while playing cards this afternoon. Pain became worse this evening while watching TV so came to ED. EKG NSR w/o ischemia, noted to be mildly tachy to low 100s, no fever, no cough/sob/leg pain or leg swelling, no recent illness, no recent travel. Primary concern is for PE, ordered for labs and CT PE, will also place on tele monitoring and obtain troponin.

## 2025-04-22 NOTE — ED CDU PROVIDER INITIAL DAY NOTE - PROGRESS NOTE DETAILS
GEN: no acute respiratory distress. nontoxic, speaking comfortably in full sentences,   HEENT: NCAT. face symmetrical. PERRL 4mm, EOMI, ,  MMM, oropharynx wnl.  Neck: no JVD, trachea midline, no lymphadenopathy, FROM  CV: RRR. +S1S2, no murmur. 2+ pulses in 4 extremities, cap refill <2 sec  Chest: CTA B/l. no wheezing, rales, rhonchi. no retractions. good air movement.   ABD: +BS, soft, non distended, non tender.  no RUQ tenderness  MSK: No clubbing, cyanosis, edema. FROM of all extremities. no tenderness to palpation. No midline or paraspinal tenderness.   Neuro: AAOX3. Sensation intact, motor 5/5 throughout.   SKIN: No erythema, lesions or rash    85-year-old female past medical history medical history diabetes, hypertension, hyperlipidemia, Sjogren's syndrome with redness central chest pain/heaviness which was worse during inspiration while playing cards.  Patient denies diaphoresis, dizziness, palpitations, abdominal pain, nausea vomiting.  Patient reports pain resolved while in emergency department.  At time my evaluation patient reports no symptoms.    Plan: Continue telemetry monitoring, stress test, cardiology consult KIZZY Pastor: 85-year-old female with a past medical history of diabetes, hypertension, hyperlipidemia, Sjogren syndrome, rheumatoid arthritis presented to the emergency department complaining of left-sided chest pain.  Patient placed in CDU for cardiac monitoring, echo, stress test and TeleDoc.  Patient was seen and cleared by cardiology Dr. Khanna advised follow-up outpatient.  Patient feels improved, tolerating p.o., discharge and results reviewed with patient.

## 2025-04-22 NOTE — CONSULT NOTE ADULT - SUBJECTIVE AND OBJECTIVE BOX
Cardiology/Vascular Medicine Inpatient Consultation Note    Date of Admission:        CHIEF COMPLAINT:        HISTORY OF PRESENT ILLNESS:  HPI:          Allergies    Remicade (Anaphylaxis)    Intolerances    	    MEDICATIONS:  losartan 25 milliGRAM(s) Oral daily        acetaminophen     Tablet .. 975 milliGRAM(s) Oral once PRN    pantoprazole    Tablet 40 milliGRAM(s) Oral before breakfast    dextrose 50% Injectable 25 Gram(s) IV Push once  dextrose 50% Injectable 12.5 Gram(s) IV Push once  dextrose 50% Injectable 25 Gram(s) IV Push once  dextrose Oral Gel 15 Gram(s) Oral once PRN  glucagon  Injectable 1 milliGRAM(s) IntraMuscular once  insulin lispro (ADMELOG) corrective regimen sliding scale   SubCutaneous Before meals and at bedtime  levothyroxine 100 MICROGram(s) Oral daily    dextrose 5%. 1000 milliLiter(s) IV Continuous <Continuous>  dextrose 5%. 1000 milliLiter(s) IV Continuous <Continuous>  folic acid 1 milliGRAM(s) Oral daily  multivitamin 1 Tablet(s) Oral daily      PAST MEDICAL & SURGICAL HISTORY:  Sjoegren syndrome      Rheumatoid arthritis      Osteopenia      Biliary cirrhosis      Nephrolithiasis      Type 2 diabetes mellitus      Hypothyroid      Former smoker      Status post laminectomy      Cataract  left eye cataract surgery 3/3/2014          FAMILY HISTORY:  No pertinent family history in first degree relatives        SOCIAL HISTORY:    [ ] Non-smoker  [ ] Smoker  [ ] Alcohol    REVIEW OF SYSTEMS:  CONSTITUTIONAL: No fever, weight loss, or fatigue  EYES: No eye pain, visual disturbances, or discharge  ENMT:  No difficulty hearing, tinnitus, vertigo; No sinus or throat pain  NECK: No pain or stiffness  RESPIRATORY: No cough, wheezing, chills or hemoptysis; No Shortness of Breath  CARDIOVASCULAR: No chest pain, palpitations, passing out, dizziness, or leg swelling  GASTROINTESTINAL: No abdominal or epigastric pain. No nausea, vomiting, or hematemesis; No diarrhea or constipation. No melena or hematochezia.  GENITOURINARY: No dysuria, frequency, hematuria, or incontinence  NEUROLOGICAL: No headaches, memory loss, loss of strength, numbness, or tremors  SKIN: No itching, burning, rashes, or lesions   LYMPH Nodes: No enlarged glands  ENDOCRINE: No heat or cold intolerance; No hair loss  MUSCULOSKELETAL: No joint pain or swelling; No muscle, back, or extremity pain  PSYCHIATRIC: No depression, anxiety, mood swings, or difficulty sleeping  HEME/LYMPH: No easy bruising, or bleeding gums  ALLERY AND IMMUNOLOGIC: No hives or eczema	    [ ] All others negative	  [ ] Unable to obtain    PHYSICAL EXAM:  T(C): 36.7 (04-22-25 @ 06:00), Max: 36.8 (04-22-25 @ 03:20)  HR: 90 (04-22-25 @ 06:00) (90 - 106)  BP: 140/81 (04-22-25 @ 06:00) (140/81 - 167/84)  RR: 16 (04-22-25 @ 06:00) (16 - 18)  SpO2: 100% (04-22-25 @ 06:00) (97% - 100%)  Wt(kg): --  I&O's Summary      Appearance: Normal	  HEENT:   Normal oral mucosa, PERRL, EOMI	  Lymphatic: No lymphadenopathy  Cardiovascular: Normal S1 S2, No JVD, No murmurs, No edema  Respiratory: Lungs clear to auscultation	  Psychiatry: A & O x 3, Mood & affect appropriate  Gastrointestinal:  Soft, Non-tender, + BS	  Skin: No rashes, No ecchymoses, No cyanosis	  Neurologic: Non-focal  Extremities: Normal range of motion, No clubbing, cyanosis or edema  Vascular: Peripheral pulses palpable 2+ bilaterally      LABS:	 	    CBC Full  -  ( 22 Apr 2025 00:20 )  WBC Count : 11.78 K/uL  Hemoglobin : 9.3 g/dL  Hematocrit : 31.7 %  Platelet Count - Automated : 315 K/uL  Mean Cell Volume : 80.1 fL  Mean Cell Hemoglobin : 23.5 pg  Mean Cell Hemoglobin Concentration : 29.3 g/dL  Auto Neutrophil # : x  Auto Lymphocyte # : x  Auto Monocyte # : x  Auto Eosinophil # : x  Auto Basophil # : x  Auto Neutrophil % : x  Auto Lymphocyte % : x  Auto Monocyte % : x  Auto Eosinophil % : x  Auto Basophil % : x    04-22    138  |  103  |  22  ----------------------------<  139[H]  4.2   |  20[L]  |  0.79    Ca    10.1      22 Apr 2025 00:20    TPro  6.5  /  Alb  3.5  /  TBili  0.3  /  DBili  x   /  AST  17  /  ALT  12  /  AlkPhos  99  04-22      proBNP:   Lipid Profile:   HgA1c:   TSH: Thyroid Stimulating Hormone, Serum: 0.90 uIU/mL (04-22-25 @ 03:18)        CARDIAC MARKERS:            TELEMETRY: 	    ECG:   	  RADIOLOGY:  OTHER: 	      PREVIOUS DIAGNOSTIC CARDIOVASCULAR TESTING:      [ ]  Echocardiogram:  [ ]  Catheterization:  [ ]  Stress test:    [ ]  Vascular studies:  	  	     Cardiology/Vascular Medicine Inpatient Consultation Note    Date of Admission:        CHIEF COMPLAINT:        HISTORY OF PRESENT ILLNESS:  HPI:      85F h/o diabetes, HTN. HLD, Sjorgen's syndrome, rheumatoid arthritis, former smoker no prior cardiac history p/w sudden onset L sided chest pain, worse with inspiration that started at rest while playing cards this afternoon. Pain became worse this evening while watching TV so came to ED. EKG NSR w/o ischemia, noted to be mildly tachy to low 100s, no fever, no cough/sob/leg pain or leg swelling, no recent illness, no recent travel. In the ED pt was noted to have a small pericardial effusion, no PE, also incidental gallbladder distention though the pt has a benign abdominal exam and normal LFT's. Plan is CDU placement for echocardiogram and stress testing for further eval of the patient's chest pain and pericardial effusion.    Allergies    Remicade (Anaphylaxis)    Intolerances    	    MEDICATIONS:  losartan 25 milliGRAM(s) Oral daily        acetaminophen     Tablet .. 975 milliGRAM(s) Oral once PRN    pantoprazole    Tablet 40 milliGRAM(s) Oral before breakfast    dextrose 50% Injectable 25 Gram(s) IV Push once  dextrose 50% Injectable 12.5 Gram(s) IV Push once  dextrose 50% Injectable 25 Gram(s) IV Push once  dextrose Oral Gel 15 Gram(s) Oral once PRN  glucagon  Injectable 1 milliGRAM(s) IntraMuscular once  insulin lispro (ADMELOG) corrective regimen sliding scale   SubCutaneous Before meals and at bedtime  levothyroxine 100 MICROGram(s) Oral daily    dextrose 5%. 1000 milliLiter(s) IV Continuous <Continuous>  dextrose 5%. 1000 milliLiter(s) IV Continuous <Continuous>  folic acid 1 milliGRAM(s) Oral daily  multivitamin 1 Tablet(s) Oral daily      PAST MEDICAL & SURGICAL HISTORY:  Sjoegren syndrome      Rheumatoid arthritis      Osteopenia      Biliary cirrhosis      Nephrolithiasis      Type 2 diabetes mellitus      Hypothyroid      Former smoker      Status post laminectomy      Cataract  left eye cataract surgery 3/3/2014          FAMILY HISTORY:  No pertinent family history in first degree relatives        SOCIAL HISTORY:    [ ] Non-smoker  [ ] Smoker  [ ] Alcohol    REVIEW OF SYSTEMS:  CONSTITUTIONAL: No fever, weight loss, or fatigue  EYES: No eye pain, visual disturbances, or discharge  ENMT:  No difficulty hearing, tinnitus, vertigo; No sinus or throat pain  NECK: No pain or stiffness  RESPIRATORY: No cough, wheezing, chills or hemoptysis; No Shortness of Breath  CARDIOVASCULAR: No chest pain, palpitations, passing out, dizziness, or leg swelling  GASTROINTESTINAL: No abdominal or epigastric pain. No nausea, vomiting, or hematemesis; No diarrhea or constipation. No melena or hematochezia.  GENITOURINARY: No dysuria, frequency, hematuria, or incontinence  NEUROLOGICAL: No headaches, memory loss, loss of strength, numbness, or tremors  SKIN: No itching, burning, rashes, or lesions   LYMPH Nodes: No enlarged glands  ENDOCRINE: No heat or cold intolerance; No hair loss  MUSCULOSKELETAL: No joint pain or swelling; No muscle, back, or extremity pain  PSYCHIATRIC: No depression, anxiety, mood swings, or difficulty sleeping  HEME/LYMPH: No easy bruising, or bleeding gums  ALLERY AND IMMUNOLOGIC: No hives or eczema	    [ ] All others negative	  [ ] Unable to obtain    PHYSICAL EXAM:  T(C): 36.7 (04-22-25 @ 06:00), Max: 36.8 (04-22-25 @ 03:20)  HR: 90 (04-22-25 @ 06:00) (90 - 106)  BP: 140/81 (04-22-25 @ 06:00) (140/81 - 167/84)  RR: 16 (04-22-25 @ 06:00) (16 - 18)  SpO2: 100% (04-22-25 @ 06:00) (97% - 100%)  Wt(kg): --  I&O's Summary      Appearance: Normal	  HEENT:   Normal oral mucosa, PERRL, EOMI	  Lymphatic: No lymphadenopathy  Cardiovascular: Normal S1 S2, No JVD, No murmurs, No edema  Respiratory: Lungs clear to auscultation	  Psychiatry: A & O x 3, Mood & affect appropriate  Gastrointestinal:  Soft, Non-tender, + BS	  Skin: No rashes, No ecchymoses, No cyanosis	  Neurologic: Non-focal  Extremities: Normal range of motion, No clubbing, cyanosis or edema  Vascular: Peripheral pulses palpable 2+ bilaterally      LABS:	 	    CBC Full  -  ( 22 Apr 2025 00:20 )  WBC Count : 11.78 K/uL  Hemoglobin : 9.3 g/dL  Hematocrit : 31.7 %  Platelet Count - Automated : 315 K/uL  Mean Cell Volume : 80.1 fL  Mean Cell Hemoglobin : 23.5 pg  Mean Cell Hemoglobin Concentration : 29.3 g/dL  Auto Neutrophil # : x  Auto Lymphocyte # : x  Auto Monocyte # : x  Auto Eosinophil # : x  Auto Basophil # : x  Auto Neutrophil % : x  Auto Lymphocyte % : x  Auto Monocyte % : x  Auto Eosinophil % : x  Auto Basophil % : x    04-22    138  |  103  |  22  ----------------------------<  139[H]  4.2   |  20[L]  |  0.79    Ca    10.1      22 Apr 2025 00:20    TPro  6.5  /  Alb  3.5  /  TBili  0.3  /  DBili  x   /  AST  17  /  ALT  12  /  AlkPhos  99  04-22      proBNP:   Lipid Profile:   HgA1c:   TSH: Thyroid Stimulating Hormone, Serum: 0.90 uIU/mL (04-22-25 @ 03:18)        CARDIAC MARKERS:            TELEMETRY: 	    ECG:   	  RADIOLOGY:  OTHER: 	      PREVIOUS DIAGNOSTIC CARDIOVASCULAR TESTING:      [ ]  Echocardiogram:  [ ]  Catheterization:  [ ]  Stress test:    [ ]  Vascular studies:  	  	     Cardiology/Vascular Medicine Inpatient Consultation Note    HISTORY OF PRESENT ILLNESS:    Patient is an 84 yo woman with HTN. HLD, DM2, Sjogren's syndrome, rheumatoid arthritis, former smoker.    She presented to the [facility] ED with complaints of sudden-onset left-sided chest pain, worsened with inspiration.  She reported having no other associated cardiac complaints.  No recent illnesses.  Her last cardiac assessment was via TTE in 2024 which noted normal biventricular systolic function, trace pericardial effusion.    In the ED, labs within normal limits, trop <6 x 2,   CXR clear lungs  CTA chest no obvious PE noted    EKG NSR, normal axis, normal intervals. No significant findings noted on bedside telemetry.    At the time of my evaluation, the patient appeared clinically and hemodynamically stable.  Physical exam was unremarkable.  Appears euvolemic on exam.    CDU team had already arranged for an echocardiogram for the patient.    Echocardiogram demonstrated normal biventricular systolic function, no pericardial effusion.    Impression:  Unclear etiology of symptoms, but likely atypical chest discomfort.   Currently asymptomatic.    Recommendations:  No further inpatient cardiac testing is needed at this time.  From the cardiac perspective, the patient may be discharged to home and can f/u with us in the office for routine evaluation ([phone]).    Allergies  Remicade (Anaphylaxis)    MEDICATIONS:  losartan 25 milliGRAM(s) Oral daily  acetaminophen     Tablet .. 975 milliGRAM(s) Oral once PRN  pantoprazole    Tablet 40 milliGRAM(s) Oral before breakfast  dextrose 50% Injectable 25 Gram(s) IV Push once  dextrose 50% Injectable 12.5 Gram(s) IV Push once  dextrose 50% Injectable 25 Gram(s) IV Push once  dextrose Oral Gel 15 Gram(s) Oral once PRN  glucagon  Injectable 1 milliGRAM(s) IntraMuscular once  insulin lispro (ADMELOG) corrective regimen sliding scale   SubCutaneous Before meals and at bedtime  levothyroxine 100 MICROGram(s) Oral daily  dextrose 5%. 1000 milliLiter(s) IV Continuous <Continuous>  dextrose 5%. 1000 milliLiter(s) IV Continuous <Continuous>  folic acid 1 milliGRAM(s) Oral daily  multivitamin 1 Tablet(s) Oral daily    PAST MEDICAL & SURGICAL HISTORY:  Sjoegren syndrome  Rheumatoid arthritis  Osteopenia  Biliary cirrhosis  Nephrolithiasis  Type 2 diabetes mellitus  Hypothyroid  Former smoker  Status post laminectomy  Cataract left eye cataract surgery 3/3/2014    FAMILY HISTORY:  No pertinent family history in first degree relatives    SOCIAL HISTORY:    NC    REVIEW OF SYSTEMS:  As above, as per chart notes    PHYSICAL EXAM:  T(C): 36.7 (04-22-25 @ 06:00), Max: 36.8 (04-22-25 @ 03:20)  HR: 90 (04-22-25 @ 06:00) (90 - 106)  BP: 140/81 (04-22-25 @ 06:00) (140/81 - 167/84)  RR: 16 (04-22-25 @ 06:00) (16 - 18)  SpO2: 100% (04-22-25 @ 06:00) (97% - 100%)    Appearance: NAD  HEENT:   No apparent JVD  Cardiovascular: Normal S1 S2  Respiratory: Lungs clear to auscultation	  Psychiatry: Awake, alert  Neurologic: Non-focal  Extremities: No LE edema      LABS:	 	    CBC Full  -  ( 22 Apr 2025 00:20 )  WBC Count : 11.78 K/uL  Hemoglobin : 9.3 g/dL  Hematocrit : 31.7 %  Platelet Count - Automated : 315 K/uL  Mean Cell Volume : 80.1 fL  Mean Cell Hemoglobin : 23.5 pg  Mean Cell Hemoglobin Concentration : 29.3 g/dL  Auto Neutrophil # : x  Auto Lymphocyte # : x  Auto Monocyte # : x  Auto Eosinophil # : x  Auto Basophil # : x  Auto Neutrophil % : x  Auto Lymphocyte % : x  Auto Monocyte % : x  Auto Eosinophil % : x  Auto Basophil % : x    04-22    138  |  103  |  22  ----------------------------<  139[H]  4.2   |  20[L]  |  0.79    Ca    10.1      22 Apr 2025 00:20    TPro  6.5  /  Alb  3.5  /  TBili  0.3  /  DBili  x   /  AST  17  /  ALT  12  /  AlkPhos  99  04-22    TSH: Thyroid Stimulating Hormone, Serum: 0.90 uIU/mL (04-22-25 @ 03:18)    < from: Xray Chest 2 Views PA/Lat (04.22.25 @ 01:40) >    ACC: [id number] EXAM:  XR CHEST PA LAT 2V   ORDERED BY: YEFRI BINGHAM     PROCEDURE DATE:  04/22/2025          INTERPRETATION:  EXAMINATION: XR CHEST PA AND LATERAL    CLINICAL INDICATION: CP    TECHNIQUE: 2 views; Frontal and lateral views of the chest were obtained.    COMPARISON: Chest x-ray 8/13/2024.    FINDINGS:    The heart is normal in size.  The lungs are clear.  There is no pneumothorax or pleural effusion.  No acute bony abnormality.    IMPRESSION:  Clear lungs.    --- End of Report ---          KATHLEEN GORDON MD; Resident Radiologist  This document has been electronically signed.  GIOVANNI ELLIS MD; Attending Radiologist  This document has been electronically signed. Apr 22 2025  8:26AM    < end of copied text >  < from: CT Angio Chest PE Protocol w/ IV Cont (04.22.25 @ 02:31) >    ACC: [id number] EXAM:  CT ANGIO CHEST PULM ART [facility]   ORDERED BY: YEFRI BINGHAM     PROCEDURE DATE:  04/22/2025          INTERPRETATION:  CLINICAL INDICATION: Pleuritic chest pain, tachycardia    PROCEDURE: CT angiography of the chest was performedwith intravenous   contrast utilizing dedicated PE protocol. Coronal and sagittal   reconstruction images were obtained. Axial MIP images were obtained from   a separate workstation.    CONTRAST/COMPLICATIONS:  IV Contrast: Omnipaque 350  31 cc administered   69 cc discarded  Oral Contrast: NONE  Complications: None    COMPARISON: 3/5/2024. 3/7/2024.    FINDINGS: Patient's respiratory motion degrades images.    LUNGS AND AIRWAYS: Patent central airways. Atelectasis. Stable scattered   small nodules.  PLEURA: No pleural effusion or pneumothorax.  HEART: Stable heart size. Trace pericardial effusion. Aortic valve and   coronary artery calcification.  VESSELS: No obvious pulmonary embolus. Stable mild main pulmonary artery   enlargement. Atherosclerosis. Normal caliber of the thoracic aorta.  MEDIASTINUM AND LEV: Subcentimeter lymph nodes.  CHEST WALL AND LOWER NECK: Unremarkable.  UPPER ABDOMEN: Mildly distended gallbladder. Perinephric stranding. Again   noted, bilateral renal cysts.  BONES: Degenerative changes of the spine. Stable endplate   depression/anterior wedging of the spine. Chronic bilateral rib   deformities again noted.    IMPRESSION:    No obvious pulmonary embolus. Additional findings as described.    --- End of Report ---            GEETHA SPRAGUE MD; Attending Radiologist  This document has been electronically signed. Apr 22 2025  2:58AM    < end of copied text >  < from: TTE W or WO Ultrasound Enhancing Agent (04.22.25 @ 07:37) >    TRANSTHORACIC ECHOCARDIOGRAM REPORT  ________________________________________________________________________________                                      _______       Pt. Name:       DEBBIE NUÑZE Study Date:    4/22/2025  MRN:[id number]             YOB: 1940  Accession #:    [id number]             Age:           85 years  Account#:       [id number]              Gender:        F  Heart Rate:                           Height:        62.00 in (157.48 cm)  Rhythm:                            Weight:        145.00 lb (65.77 kg)  Blood Pressure: 140/81 mmHg           BSA/BMI:       1.67 m² / 26.52 kg/m²  ________________________________________________________________________________________  Referring Physician:    ED CDU  Interpreting Physician: Segun Buck MD, FACC, RPVI  Primary Sonographer:    Sonia Hill RUST    CPT:               ECHO TTE WO CON COMP W DOPP - 55463.m  Indication(s):     Chest pain, unspecified - R07.9  Procedure:         Transthoracic echocardiogram with 2-D, M-mode and complete                     spectral and color flow Doppler.  Ordering Location: [facility]  Admission Status:  ED  Study Information: Image quality for this study is fair.    _______________________________________________________________________________________     CONCLUSIONS:      1. Left ventricular systolic function is normal with an ejection fraction of 58 % by Fontanez's method of disks.   2. There is mild (grade 1) left ventricular diastolic dysfunction.   3. Left atrium is normal in size.   4. The right atrium is normal in size.   5. Normal right ventricular cavity size, with normal wall thickness, and probably normal right ventricular systolic function.   6. No significant valvular disease.   7. Trileaflet aortic valve with normal systolic excursion. There is calcification of the aortic valve leaflets.   8. No pericardial effusion seen.    < end of copied text >

## 2025-04-22 NOTE — ED CDU PROVIDER INITIAL DAY NOTE - ATTENDING APP SHARED VISIT CONTRIBUTION OF CARE
Pt with PMH as above presented with CP at rest and low grade tachycardia, CTA chest w/o PE, small pericardial effusion, sent to CDU for echo, stress, tele, cards

## 2025-04-22 NOTE — ED PROVIDER NOTE - PHYSICAL EXAMINATION
GEN - NAD; well appearing; A+O x3   HEAD - NC/AT   ENT: Airway patent, mmm  NECK: Neck supple  PULMONARY - CTA b/l, symmetric breath sounds.   CARDIAC -s1s2, RRR, no M,G,R  ABDOMEN - +BS, ND, NT, soft, no guarding, no rebound, no masses   EXTREMITIES - FROM, symmetric pulses, capillary refill < 2 seconds, no edema   NEUROLOGIC - alert, speech clear, moving all 4 ext spontaneously  PSYCH -nl mood/affect, nl insight.

## 2025-04-22 NOTE — ED PROVIDER NOTE - CARE PLAN
Principal Discharge DX:	Pleuritic chest pain   1 Principal Discharge DX:	Pleuritic chest pain  Secondary Diagnosis:	Pericardial effusion

## 2025-04-22 NOTE — ED CDU PROVIDER DISPOSITION NOTE - ATTENDING CONTRIBUTION TO CARE
85-year-old female past medical history diabetes, hypertension, rheumatoid arthritis, Sjogren's presenting for left-sided chest pain worse with deep inspiration.  Onset of symptoms while playing cards.  Bone without significant abnormality.  CTA chest without evidence of pulmonary embolism, there was a trace pericardial effusion.  There was a mildly distended gallbladder (however on exam patient has no tenderness in abdomen and denies abdominal pain.  Patient placed in observation for echocardiogram, stress test, cardiology evaluation.    GEN: no acute respiratory distress. nontoxic, speaking comfortably in full sentences  HEENT: NCAT. face symmetrical. PERRL 4mm, EOMI, MMM, oropharynx wnl.  Neck: no JVD, trachea midline, no lymphadenopathy, FROM  CV: RRR. +S1S2, no murmur. 2+ pulses in 4 extremities, cap refill <2 sec  Chest: CTA B/l. no wheezing, rales, rhonchi. no retractions. good air movement.   ABD: +BS, soft, non distended, non tender.   MSK: No clubbing, cyanosis, edema. FROM of all extremities. no tenderness to palpation. No midline or paraspinal tenderness.   Neuro: AAOX3.  sensation and motor grossly intact.    Echocardiogram demonstrating mild left ventricular diastolic dysfunction but normal systolic function.  Stress test without evidence of infarction or inducible ischemia.    Cardiology recommending outpatient follow-up.  Patient asymptomatic and stable for discharge.  Return precautions given.

## 2025-04-22 NOTE — ED CDU PROVIDER DISPOSITION NOTE - CLINICAL COURSE
KIZZY Pastor: 85-year-old female with a past medical history of diabetes, hypertension, hyperlipidemia, Sjogren syndrome, rheumatoid arthritis presented to the emergency department complaining of left-sided chest pain.  Patient placed in CDU for cardiac monitoring, echo, stress test and TeleDoc.  Patient was seen and cleared by cardiology Dr. Khanna advised follow-up outpatient.  Patient feels improved, tolerating p.o., discharge and results reviewed with patient.

## 2025-04-22 NOTE — ED CDU PROVIDER DISPOSITION NOTE - PATIENT PORTAL LINK FT
You can access the FollowMyHealth Patient Portal offered by  by registering at the following website: [email or web address]. By joining [employer]’s FollowMyHealth portal, you will also be able to view your health information using other applications (apps) compatible with our system.

## 2025-04-22 NOTE — ED ADULT NURSE NOTE - NSFALLUNIVINTERV_ED_ALL_ED
Bed/Stretcher in lowest position, wheels locked, appropriate side rails in place/Call bell, personal items and telephone in reach/Instruct patient to call for assistance before getting out of bed/chair/stretcher/Non-slip footwear applied when patient is off stretcher/[place] to call system/Physically safe environment - no spills, clutter or unnecessary equipment/Purposeful proactive rounding/Room/bathroom lighting operational, light cord in reach

## 2025-04-22 NOTE — ED CDU PROVIDER INITIAL DAY NOTE - OBJECTIVE STATEMENT
85F h/o diabetes, HTN. HLD, Sjorgen's syndrome, rheumatoid arthritis, former smoker no prior cardiac history p/w sudden onset L sided chest pain, worse with inspiration that started at rest while playing cards this afternoon. Pain became worse this evening while watching TV so came to ED. EKG NSR w/o ischemia, noted to be mildly tachy to low 100s, no fever, no cough/sob/leg pain or leg swelling, no recent illness, no recent travel. In the ED pt was noted to have a small pericardial effusion, no PE, also incidental gallbladder distention though the pt has a benign abdominal exam and normal LFT's. Plan is CDU placement for echocardiogram and stress testing for further eval of the patient's chest pain and pericardial effusion.

## 2025-04-22 NOTE — CONSULT NOTE ADULT - ASSESSMENT
Patient is an 86 yo woman with HTN. HLD, DM2, Sjogren's syndrome, rheumatoid arthritis, former smoker.    She presented to the [facility] ED with complaints of sudden-onset left-sided chest pain, worsened with inspiration.  She reported having no other associated cardiac complaints.  No recent illnesses.  Her last cardiac assessment was via TTE in 2024 which noted normal biventricular systolic function, trace pericardial effusion.    In the ED, labs within normal limits, trop <6 x 2,   CXR clear lungs  CTA chest no obvious PE noted    EKG NSR, normal axis, normal intervals. No significant findings noted on bedside telemetry.    At the time of my evaluation, the patient appeared clinically and hemodynamically stable.  Physical exam was unremarkable.  Appears euvolemic on exam.    CDU team had already arranged for an echocardiogram for the patient.    Echocardiogram demonstrated normal biventricular systolic function, no pericardial effusion.    Impression:  Unclear etiology of symptoms, but likely atypical chest discomfort.   Currently asymptomatic.    Recommendations:  No further inpatient cardiac testing is needed at this time.  From the cardiac perspective, the patient may be discharged to home and can f/u with us in the office for routine evaluation ([phone]).

## 2025-04-22 NOTE — ED ADULT NURSE REASSESSMENT NOTE - NS ED NURSE REASSESS COMMENT FT1
Er pt returning from Nuclear stress test, pt AOx3 cooperative, place on CM with SNR Hr. 70b/min, lungs clear, resp. equal 18-20b/min, pt Adm to CDU no bed available at present time.  pt denies CP no SOB at present time.    Will con't to monitor.     Kamille Ward RN
pt remains at baseline mental status A&oX3, RR even unlabored completing full clear sentences. pt resting in stretcher comfortably at this time, no new complaints offered. rpt vs per flowsheet. rpt labs drawn and sent as ordered. stretcher lowest position siderails up safety measures in place. per KIZZY Lewis pt to be admitted to hospital
pt remains at baseline mental status, RR even unlabored completing full clear sentences. pt resting in stretcher comfortably at this time, no new complaints offered. rpt vs per flowsheet. pt medicated per EMAR. stretcher lowest position siderails up safety measures in place. pt pending CDU bed at this time
pt remains at baseline mental status, RR even unlabored completing full sentences. pt resting in stretcher comfortably at this time, no new complaints offered. stretcher lowest position siderails up safety measures in place. pt sent to [facility] scheduled with transporter.

## 2025-04-22 NOTE — ED CDU PROVIDER INITIAL DAY NOTE - NSICDXPASTMEDICALHX_GEN_ALL_CORE_FT
PAST MEDICAL HISTORY:  Biliary cirrhosis     Former smoker     Hypothyroid     Nephrolithiasis     Osteopenia     Rheumatoid arthritis     Sjoegren syndrome     Type 2 diabetes mellitus

## 2025-04-22 NOTE — ED ADULT NURSE NOTE - OBJECTIVE STATEMENT
pt received to rm 19  , a&ox4 , ambulatory , phx of  HTN, CKD, , p/w chest pain starting this afternoon  . Pt breathing even and unlabored on room air. NSR on cardiac monitor. Denies fever, chills, cough, SOB,  palpitations, dizziness, nausea, vomiting, diarrhea, constipation, numbness, tingling. IV placed. Labs collected and sent. EKG in chart. pending lab R .  pt educated on fall precautions and confirms understanding via teach back method. Stretcher locked in lowest position with siderails up x2. Call bell and personal items within reach.

## 2025-04-29 PROBLEM — Z87.891 PERSONAL HISTORY OF NICOTINE DEPENDENCE: Chronic | Status: ACTIVE | Noted: 2025-04-22

## 2025-05-12 ENCOUNTER — APPOINTMENT (OUTPATIENT)
Dept: RHEUMATOLOGY | Facility: CLINIC | Age: 85
End: 2025-05-12
Payer: MEDICARE

## 2025-05-12 VITALS
HEART RATE: 98 BPM | SYSTOLIC BLOOD PRESSURE: 169 MMHG | DIASTOLIC BLOOD PRESSURE: 83 MMHG | TEMPERATURE: 97.3 F | RESPIRATION RATE: 16 BRPM | OXYGEN SATURATION: 99 %

## 2025-05-12 VITALS
HEART RATE: 92 BPM | OXYGEN SATURATION: 98 % | RESPIRATION RATE: 16 BRPM | SYSTOLIC BLOOD PRESSURE: 151 MMHG | DIASTOLIC BLOOD PRESSURE: 76 MMHG

## 2025-05-12 PROCEDURE — 36415 COLL VENOUS BLD VENIPUNCTURE: CPT

## 2025-05-12 PROCEDURE — 96365 THER/PROPH/DIAG IV INF INIT: CPT

## 2025-05-12 RX ORDER — ABATACEPT 250 MG/15ML
250 INJECTION, POWDER, LYOPHILIZED, FOR SOLUTION INTRAVENOUS
Qty: 0 | Refills: 0 | Status: COMPLETED
Start: 2024-12-05

## 2025-05-13 LAB
ALBUMIN SERPL ELPH-MCNC: 4 G/DL
ALP BLD-CCNC: 97 U/L
ALT SERPL-CCNC: 13 U/L
ANION GAP SERPL CALC-SCNC: 20 MMOL/L
AST SERPL-CCNC: 16 U/L
BASOPHILS # BLD AUTO: 0.09 K/UL
BASOPHILS NFR BLD AUTO: 1 %
BILIRUB SERPL-MCNC: 0.2 MG/DL
BUN SERPL-MCNC: 34 MG/DL
CALCIUM SERPL-MCNC: 10.3 MG/DL
CHLORIDE SERPL-SCNC: 103 MMOL/L
CO2 SERPL-SCNC: 21 MMOL/L
CREAT SERPL-MCNC: 1.02 MG/DL
CRP SERPL-MCNC: <3 MG/L
EGFRCR SERPLBLD CKD-EPI 2021: 55 ML/MIN/1.73M2
EOSINOPHIL # BLD AUTO: 0.23 K/UL
EOSINOPHIL NFR BLD AUTO: 2.5 %
ERYTHROCYTE [SEDIMENTATION RATE] IN BLOOD BY WESTERGREN METHOD: 59 MM/HR
GLUCOSE SERPL-MCNC: 202 MG/DL
HCT VFR BLD CALC: 32 %
HGB BLD-MCNC: 9.1 G/DL
IMM GRANULOCYTES NFR BLD AUTO: 0.3 %
LYMPHOCYTES # BLD AUTO: 2.34 K/UL
LYMPHOCYTES NFR BLD AUTO: 25.3 %
MAN DIFF?: NORMAL
MCHC RBC-ENTMCNC: 23.5 PG
MCHC RBC-ENTMCNC: 28.4 G/DL
MCV RBC AUTO: 82.7 FL
MONOCYTES # BLD AUTO: 0.69 K/UL
MONOCYTES NFR BLD AUTO: 7.5 %
NEUTROPHILS # BLD AUTO: 5.86 K/UL
NEUTROPHILS NFR BLD AUTO: 63.4 %
PLATELET # BLD AUTO: 326 K/UL
POTASSIUM SERPL-SCNC: 4.9 MMOL/L
PROT SERPL-MCNC: 6.6 G/DL
RBC # BLD: 3.87 M/UL
RBC # FLD: 18 %
SODIUM SERPL-SCNC: 144 MMOL/L
WBC # FLD AUTO: 9.24 K/UL

## 2025-05-29 ENCOUNTER — APPOINTMENT (OUTPATIENT)
Dept: RHEUMATOLOGY | Facility: CLINIC | Age: 85
End: 2025-05-29
Payer: MEDICARE

## 2025-05-29 VITALS
OXYGEN SATURATION: 98 % | BODY MASS INDEX: 23.9 KG/M2 | HEART RATE: 80 BPM | SYSTOLIC BLOOD PRESSURE: 173 MMHG | DIASTOLIC BLOOD PRESSURE: 91 MMHG | HEIGHT: 64 IN | RESPIRATION RATE: 16 BRPM | WEIGHT: 140 LBS

## 2025-05-29 DIAGNOSIS — M47.816 SPONDYLOSIS W/OUT MYELOPATHY OR RADICULOPATHY, LUMBAR REGION: ICD-10-CM

## 2025-05-29 DIAGNOSIS — Z79.899 OTHER LONG TERM (CURRENT) DRUG THERAPY: ICD-10-CM

## 2025-05-29 DIAGNOSIS — M06.9 RHEUMATOID ARTHRITIS, UNSPECIFIED: ICD-10-CM

## 2025-05-29 DIAGNOSIS — M16.11 UNILATERAL PRIMARY OSTEOARTHRITIS, RIGHT HIP: ICD-10-CM

## 2025-05-29 PROCEDURE — 99214 OFFICE O/P EST MOD 30 MIN: CPT

## 2025-05-29 PROCEDURE — G2211 COMPLEX E/M VISIT ADD ON: CPT

## 2025-06-09 ENCOUNTER — APPOINTMENT (OUTPATIENT)
Dept: RHEUMATOLOGY | Facility: CLINIC | Age: 85
End: 2025-06-09
Payer: MEDICARE

## 2025-06-09 VITALS — DIASTOLIC BLOOD PRESSURE: 77 MMHG | HEART RATE: 76 BPM | OXYGEN SATURATION: 100 % | SYSTOLIC BLOOD PRESSURE: 157 MMHG

## 2025-06-09 VITALS
OXYGEN SATURATION: 99 % | HEART RATE: 81 BPM | RESPIRATION RATE: 16 BRPM | TEMPERATURE: 97.3 F | SYSTOLIC BLOOD PRESSURE: 175 MMHG | DIASTOLIC BLOOD PRESSURE: 81 MMHG

## 2025-06-09 LAB
ALBUMIN SERPL ELPH-MCNC: 3.9 G/DL
ALP BLD-CCNC: 87 U/L
ALT SERPL-CCNC: 19 U/L
ANION GAP SERPL CALC-SCNC: 17 MMOL/L
AST SERPL-CCNC: 18 U/L
BASOPHILS # BLD AUTO: 0.05 K/UL
BASOPHILS NFR BLD AUTO: 0.7 %
BILIRUB SERPL-MCNC: 0.2 MG/DL
BUN SERPL-MCNC: 23 MG/DL
CALCIUM SERPL-MCNC: 9.1 MG/DL
CHLORIDE SERPL-SCNC: 104 MMOL/L
CO2 SERPL-SCNC: 20 MMOL/L
CREAT SERPL-MCNC: 0.92 MG/DL
CRP SERPL-MCNC: 5 MG/L
EGFRCR SERPLBLD CKD-EPI 2021: 62 ML/MIN/1.73M2
EOSINOPHIL # BLD AUTO: 0.24 K/UL
EOSINOPHIL NFR BLD AUTO: 3.4 %
ERYTHROCYTE [SEDIMENTATION RATE] IN BLOOD BY WESTERGREN METHOD: 64 MM/HR
GLUCOSE SERPL-MCNC: 107 MG/DL
HCT VFR BLD CALC: 30.3 %
HGB BLD-MCNC: 8.7 G/DL
IMM GRANULOCYTES NFR BLD AUTO: 0.3 %
LYMPHOCYTES # BLD AUTO: 2.19 K/UL
LYMPHOCYTES NFR BLD AUTO: 31.3 %
MAN DIFF?: NORMAL
MCHC RBC-ENTMCNC: 22.8 PG
MCHC RBC-ENTMCNC: 28.7 G/DL
MCV RBC AUTO: 79.3 FL
MONOCYTES # BLD AUTO: 0.5 K/UL
MONOCYTES NFR BLD AUTO: 7.2 %
NEUTROPHILS # BLD AUTO: 3.99 K/UL
NEUTROPHILS NFR BLD AUTO: 57.1 %
PLATELET # BLD AUTO: 310 K/UL
POTASSIUM SERPL-SCNC: 4.4 MMOL/L
PROT SERPL-MCNC: 6.1 G/DL
RBC # BLD: 3.82 M/UL
RBC # FLD: 18.6 %
SODIUM SERPL-SCNC: 141 MMOL/L
WBC # FLD AUTO: 6.99 K/UL

## 2025-06-09 PROCEDURE — 96365 THER/PROPH/DIAG IV INF INIT: CPT

## 2025-06-09 PROCEDURE — 36415 COLL VENOUS BLD VENIPUNCTURE: CPT

## 2025-06-09 RX ORDER — ABATACEPT 250 MG/15ML
250 INJECTION, POWDER, LYOPHILIZED, FOR SOLUTION INTRAVENOUS
Qty: 0 | Refills: 0 | Status: COMPLETED
Start: 2024-12-05

## 2025-06-11 LAB
FERRITIN SERPL-MCNC: 11 NG/ML
HAPTOGLOB SERPL-MCNC: 177 MG/DL
IRON SATN MFR SERPL: 5 %
IRON SERPL-MCNC: 17 UG/DL
TIBC SERPL-MCNC: 354 UG/DL
UIBC SERPL-MCNC: 337 UG/DL

## 2025-06-12 ENCOUNTER — APPOINTMENT (OUTPATIENT)
Dept: ENDOCRINOLOGY | Facility: CLINIC | Age: 85
End: 2025-06-12

## 2025-06-12 VITALS — BODY MASS INDEX: 24.27 KG/M2 | HEIGHT: 63 IN | WEIGHT: 137 LBS

## 2025-06-12 PROCEDURE — 77080 DXA BONE DENSITY AXIAL: CPT

## 2025-06-24 ENCOUNTER — OUTPATIENT (OUTPATIENT)
Dept: OUTPATIENT SERVICES | Facility: HOSPITAL | Age: 85
LOS: 1 days | Discharge: HOME | End: 2025-06-24

## 2025-06-24 DIAGNOSIS — H26.9 UNSPECIFIED CATARACT: Chronic | ICD-10-CM

## 2025-06-24 DIAGNOSIS — Z98.89 OTHER SPECIFIED POSTPROCEDURAL STATES: Chronic | ICD-10-CM

## 2025-06-24 DIAGNOSIS — E61.1 IRON DEFICIENCY: ICD-10-CM

## 2025-06-25 ENCOUNTER — RESULT REVIEW (OUTPATIENT)
Age: 85
End: 2025-06-25

## 2025-06-25 ENCOUNTER — APPOINTMENT (OUTPATIENT)
Dept: HEMATOLOGY ONCOLOGY | Facility: CLINIC | Age: 85
End: 2025-06-25
Payer: MEDICARE

## 2025-06-25 ENCOUNTER — APPOINTMENT (OUTPATIENT)
Dept: OPHTHALMOLOGY | Facility: CLINIC | Age: 85
End: 2025-06-25
Payer: MEDICARE

## 2025-06-25 ENCOUNTER — NON-APPOINTMENT (OUTPATIENT)
Age: 85
End: 2025-06-25

## 2025-06-25 VITALS
WEIGHT: 134.48 LBS | HEIGHT: 63 IN | DIASTOLIC BLOOD PRESSURE: 87 MMHG | BODY MASS INDEX: 23.83 KG/M2 | OXYGEN SATURATION: 98 % | HEART RATE: 110 BPM | TEMPERATURE: 97.3 F | RESPIRATION RATE: 16 BRPM | SYSTOLIC BLOOD PRESSURE: 154 MMHG

## 2025-06-25 LAB
ALBUMIN SERPL ELPH-MCNC: 4 G/DL
ALP BLD-CCNC: 92 U/L
ALT SERPL-CCNC: 17 U/L
ANION GAP SERPL CALC-SCNC: 14 MMOL/L
AST SERPL-CCNC: 17 U/L
BASOPHILS # BLD AUTO: 0.08 K/UL — SIGNIFICANT CHANGE UP (ref 0–0.2)
BASOPHILS NFR BLD AUTO: 1 % — SIGNIFICANT CHANGE UP (ref 0–2)
BILIRUB SERPL-MCNC: 0.2 MG/DL
BUN SERPL-MCNC: 25 MG/DL
CALCIUM SERPL-MCNC: 10 MG/DL
CHLORIDE SERPL-SCNC: 105 MMOL/L
CO2 SERPL-SCNC: 21 MMOL/L
CREAT SERPL-MCNC: 1.06 MG/DL
EGFRCR SERPLBLD CKD-EPI 2021: 52 ML/MIN/1.73M2
EOSINOPHIL # BLD AUTO: 0.2 K/UL — SIGNIFICANT CHANGE UP (ref 0–0.5)
EOSINOPHIL NFR BLD AUTO: 2.4 % — SIGNIFICANT CHANGE UP (ref 0–6)
FERRITIN SERPL-MCNC: 13 NG/ML
FOLATE SERPL-MCNC: >20 NG/ML
GLUCOSE SERPL-MCNC: 182 MG/DL
HAPTOGLOB SERPL-MCNC: 188 MG/DL
HCT VFR BLD CALC: 33.4 % — LOW (ref 34.5–45)
HGB BLD-MCNC: 9.6 G/DL — LOW (ref 11.5–15.5)
IMM GRANULOCYTES NFR BLD AUTO: 0.2 % — SIGNIFICANT CHANGE UP (ref 0–0.9)
IRON SATN MFR SERPL: 5 %
IRON SERPL-MCNC: 18 UG/DL
LDH SERPL-CCNC: 180 U/L
LYMPHOCYTES # BLD AUTO: 2.75 K/UL — SIGNIFICANT CHANGE UP (ref 1–3.3)
LYMPHOCYTES # BLD AUTO: 33 % — SIGNIFICANT CHANGE UP (ref 13–44)
MCHC RBC-ENTMCNC: 22.3 PG — LOW (ref 27–34)
MCHC RBC-ENTMCNC: 28.7 G/DL — LOW (ref 32–36)
MCV RBC AUTO: 77.5 FL — LOW (ref 80–100)
MONOCYTES # BLD AUTO: 0.58 K/UL — SIGNIFICANT CHANGE UP (ref 0–0.9)
MONOCYTES NFR BLD AUTO: 7 % — SIGNIFICANT CHANGE UP (ref 2–14)
NEUTROPHILS # BLD AUTO: 4.7 K/UL — SIGNIFICANT CHANGE UP (ref 1.8–7.4)
NEUTROPHILS NFR BLD AUTO: 56.4 % — SIGNIFICANT CHANGE UP (ref 43–77)
NRBC BLD AUTO-RTO: 0 /100 WBCS — SIGNIFICANT CHANGE UP (ref 0–0)
PLATELET # BLD AUTO: 337 K/UL — SIGNIFICANT CHANGE UP (ref 150–400)
POTASSIUM SERPL-SCNC: 4.7 MMOL/L
PROT SERPL-MCNC: 6.4 G/DL
RBC # BLD: 4.31 M/UL — SIGNIFICANT CHANGE UP (ref 3.8–5.2)
RBC # FLD: 20.2 % — HIGH (ref 10.3–14.5)
SODIUM SERPL-SCNC: 140 MMOL/L
TIBC SERPL-MCNC: 383 UG/DL
UIBC SERPL-MCNC: 365 UG/DL
VIT B12 SERPL-MCNC: 350 PG/ML
WBC # BLD: 8.33 K/UL — SIGNIFICANT CHANGE UP (ref 3.8–10.5)
WBC # FLD AUTO: 8.33 K/UL — SIGNIFICANT CHANGE UP (ref 3.8–10.5)

## 2025-06-25 PROCEDURE — 99204 OFFICE O/P NEW MOD 45 MIN: CPT

## 2025-06-25 PROCEDURE — 68761 CLOSE TEAR DUCT OPENING: CPT | Mod: E1,E2,LT

## 2025-06-25 PROCEDURE — 92012 INTRM OPH EXAM EST PATIENT: CPT | Mod: 25

## 2025-06-25 PROCEDURE — 92134 CPTRZ OPH DX IMG PST SGM RTA: CPT

## 2025-06-26 ENCOUNTER — NON-APPOINTMENT (OUTPATIENT)
Age: 85
End: 2025-06-26

## 2025-06-26 LAB
RETICS #: 104.2 K/UL — SIGNIFICANT CHANGE UP (ref 25–125)
RETICS/RBC NFR: 2.4 % — SIGNIFICANT CHANGE UP (ref 0.5–2.5)

## 2025-06-27 LAB
ALBUMIN MFR SERPL ELPH: 57.1 %
ALBUMIN SERPL-MCNC: 3.7 G/DL
ALBUMIN/GLOB SERPL: 1.4 RATIO
ALPHA1 GLOB MFR SERPL ELPH: 5 %
ALPHA1 GLOB SERPL ELPH-MCNC: 0.3 G/DL
ALPHA2 GLOB MFR SERPL ELPH: 13.7 %
ALPHA2 GLOB SERPL ELPH-MCNC: 0.9 G/DL
B-GLOBULIN MFR SERPL ELPH: 14 %
B-GLOBULIN SERPL ELPH-MCNC: 0.9 G/DL
DEPRECATED KAPPA LC FREE/LAMBDA SER: 1.14 RATIO
GAMMA GLOB FLD ELPH-MCNC: 0.7 G/DL
GAMMA GLOB MFR SERPL ELPH: 10.2 %
IGA SERPL-MCNC: 248 MG/DL
IGG SERPL-MCNC: 589 MG/DL
IGM SERPL-MCNC: 118 MG/DL
INTERPRETATION SERPL IEP-IMP: NORMAL
KAPPA LC CSF-MCNC: 2.58 MG/DL
KAPPA LC SERPL-MCNC: 2.94 MG/DL
M PROTEIN SPEC IFE-MCNC: NORMAL
PROT SERPL-MCNC: 6.4 G/DL
PROT SERPL-MCNC: 6.4 G/DL

## 2025-06-28 LAB — METHYLMALONATE SERPL-SCNC: 326 NMOL/L

## 2025-06-30 PROBLEM — Z63.4 WIDOWED: Status: ACTIVE | Noted: 2025-06-30

## 2025-06-30 PROBLEM — Z60.2 LIVES ALONE: Status: ACTIVE | Noted: 2025-06-30

## 2025-07-01 ENCOUNTER — APPOINTMENT (OUTPATIENT)
Dept: INFUSION THERAPY | Facility: HOSPITAL | Age: 85
End: 2025-07-01

## 2025-07-07 ENCOUNTER — APPOINTMENT (OUTPATIENT)
Dept: RHEUMATOLOGY | Facility: CLINIC | Age: 85
End: 2025-07-07
Payer: MEDICARE

## 2025-07-07 ENCOUNTER — LABORATORY RESULT (OUTPATIENT)
Age: 85
End: 2025-07-07

## 2025-07-07 VITALS
TEMPERATURE: 98 F | DIASTOLIC BLOOD PRESSURE: 81 MMHG | RESPIRATION RATE: 16 BRPM | OXYGEN SATURATION: 95 % | SYSTOLIC BLOOD PRESSURE: 143 MMHG | HEART RATE: 80 BPM

## 2025-07-07 VITALS
HEART RATE: 76 BPM | RESPIRATION RATE: 16 BRPM | DIASTOLIC BLOOD PRESSURE: 82 MMHG | OXYGEN SATURATION: 96 % | SYSTOLIC BLOOD PRESSURE: 184 MMHG

## 2025-07-07 PROCEDURE — 36415 COLL VENOUS BLD VENIPUNCTURE: CPT

## 2025-07-07 PROCEDURE — 96365 THER/PROPH/DIAG IV INF INIT: CPT

## 2025-07-07 RX ORDER — ABATACEPT 250 MG/15ML
250 INJECTION, POWDER, LYOPHILIZED, FOR SOLUTION INTRAVENOUS
Qty: 0 | Refills: 0 | Status: ACTIVE | OUTPATIENT
Start: 2025-07-07

## 2025-07-07 RX ORDER — ABATACEPT 250 MG/15ML
250 INJECTION, POWDER, LYOPHILIZED, FOR SOLUTION INTRAVENOUS
Qty: 0 | Refills: 0 | Status: COMPLETED
Start: 2024-12-05

## 2025-07-08 ENCOUNTER — APPOINTMENT (OUTPATIENT)
Dept: INFUSION THERAPY | Facility: HOSPITAL | Age: 85
End: 2025-07-08

## 2025-07-08 LAB
ALBUMIN SERPL ELPH-MCNC: 3.8 G/DL
ALP BLD-CCNC: 87 U/L
ALT SERPL-CCNC: 15 U/L
ANION GAP SERPL CALC-SCNC: 16 MMOL/L
AST SERPL-CCNC: 25 U/L
BASOPHILS # BLD AUTO: 0.06 K/UL
BASOPHILS NFR BLD AUTO: 0.9 %
BILIRUB SERPL-MCNC: 0.2 MG/DL
BUN SERPL-MCNC: 25 MG/DL
CALCIUM SERPL-MCNC: 9.6 MG/DL
CHLORIDE SERPL-SCNC: 105 MMOL/L
CO2 SERPL-SCNC: 22 MMOL/L
CREAT SERPL-MCNC: 1 MG/DL
CRP SERPL-MCNC: 10 MG/L
EGFRCR SERPLBLD CKD-EPI 2021: 56 ML/MIN/1.73M2
EOSINOPHIL # BLD AUTO: 0.33 K/UL
EOSINOPHIL NFR BLD AUTO: 5.3 %
ERYTHROCYTE [SEDIMENTATION RATE] IN BLOOD BY WESTERGREN METHOD: 51 MM/HR
GLUCOSE SERPL-MCNC: 151 MG/DL
HCT VFR BLD CALC: 33.9 %
HGB BLD-MCNC: 9.5 G/DL
LYMPHOCYTES # BLD AUTO: 1.58 K/UL
LYMPHOCYTES NFR BLD AUTO: 25.4 %
MAN DIFF?: NORMAL
MCHC RBC-ENTMCNC: 23.3 PG
MCHC RBC-ENTMCNC: 28 G/DL
MCV RBC AUTO: 83.3 FL
MONOCYTES # BLD AUTO: 0.16 K/UL
MONOCYTES NFR BLD AUTO: 2.6 %
NEUTROPHILS # BLD AUTO: 4.04 K/UL
NEUTROPHILS NFR BLD AUTO: 64.9 %
PLATELET # BLD AUTO: 322 K/UL
POTASSIUM SERPL-SCNC: 4.8 MMOL/L
PROT SERPL-MCNC: 6 G/DL
RBC # BLD: 4.07 M/UL
RBC # FLD: 24.1 %
SODIUM SERPL-SCNC: 143 MMOL/L
WBC # FLD AUTO: 6.22 K/UL

## 2025-07-14 ENCOUNTER — APPOINTMENT (OUTPATIENT)
Dept: UROLOGY | Facility: CLINIC | Age: 85
End: 2025-07-14

## 2025-07-22 ENCOUNTER — APPOINTMENT (OUTPATIENT)
Dept: INFUSION THERAPY | Facility: HOSPITAL | Age: 85
End: 2025-07-22

## 2025-07-28 ENCOUNTER — APPOINTMENT (OUTPATIENT)
Dept: UROLOGY | Facility: CLINIC | Age: 85
End: 2025-07-28
Payer: MEDICARE

## 2025-07-28 ENCOUNTER — APPOINTMENT (OUTPATIENT)
Dept: PULMONOLOGY | Facility: CLINIC | Age: 85
End: 2025-07-28

## 2025-07-28 DIAGNOSIS — N20.0 CALCULUS OF KIDNEY: ICD-10-CM

## 2025-07-28 PROCEDURE — 99213 OFFICE O/P EST LOW 20 MIN: CPT

## 2025-07-29 LAB
APPEARANCE: CLEAR
BACTERIA: NEGATIVE /HPF
BILIRUBIN URINE: NEGATIVE
BLOOD URINE: NEGATIVE
CAST: 3 /LPF
COLOR: NORMAL
EPITHELIAL CELLS: 1 /HPF
GLUCOSE QUALITATIVE U: 250 MG/DL
KETONES URINE: ABNORMAL MG/DL
LEUKOCYTE ESTERASE URINE: ABNORMAL
MICROSCOPIC-UA: NORMAL
NITRITE URINE: NEGATIVE
PH URINE: 6
PROTEIN URINE: 30 MG/DL
RED BLOOD CELLS URINE: 1 /HPF
SPECIFIC GRAVITY URINE: 1.02
UROBILINOGEN URINE: 1 MG/DL
WHITE BLOOD CELLS URINE: 5 /HPF

## 2025-07-30 LAB — BACTERIA UR CULT: NORMAL

## 2025-08-04 ENCOUNTER — LABORATORY RESULT (OUTPATIENT)
Age: 85
End: 2025-08-04

## 2025-08-04 ENCOUNTER — APPOINTMENT (OUTPATIENT)
Dept: RHEUMATOLOGY | Facility: CLINIC | Age: 85
End: 2025-08-04
Payer: MEDICARE

## 2025-08-04 VITALS
HEART RATE: 108 BPM | OXYGEN SATURATION: 95 % | RESPIRATION RATE: 16 BRPM | SYSTOLIC BLOOD PRESSURE: 181 MMHG | TEMPERATURE: 97.7 F | DIASTOLIC BLOOD PRESSURE: 85 MMHG

## 2025-08-04 VITALS
SYSTOLIC BLOOD PRESSURE: 160 MMHG | OXYGEN SATURATION: 97 % | RESPIRATION RATE: 16 BRPM | DIASTOLIC BLOOD PRESSURE: 79 MMHG | HEART RATE: 89 BPM

## 2025-08-04 PROCEDURE — 96365 THER/PROPH/DIAG IV INF INIT: CPT

## 2025-08-04 PROCEDURE — 36415 COLL VENOUS BLD VENIPUNCTURE: CPT

## 2025-08-04 RX ORDER — ABATACEPT 250 MG/15ML
250 INJECTION, POWDER, LYOPHILIZED, FOR SOLUTION INTRAVENOUS
Qty: 0 | Refills: 0 | Status: COMPLETED
Start: 2025-07-07

## 2025-08-05 ENCOUNTER — NON-APPOINTMENT (OUTPATIENT)
Age: 85
End: 2025-08-05

## 2025-08-07 LAB
ALBUMIN SERPL ELPH-MCNC: 3.8 G/DL
ALP BLD-CCNC: 128 U/L
ALT SERPL-CCNC: 14 U/L
ANION GAP SERPL CALC-SCNC: 14 MMOL/L
AST SERPL-CCNC: 19 U/L
BASOPHILS # BLD AUTO: 0.07 K/UL
BASOPHILS NFR BLD AUTO: 0.9 %
BILIRUB SERPL-MCNC: 0.2 MG/DL
BUN SERPL-MCNC: 26 MG/DL
CALCIUM SERPL-MCNC: 9.9 MG/DL
CHLORIDE SERPL-SCNC: 102 MMOL/L
CO2 SERPL-SCNC: 25 MMOL/L
CREAT SERPL-MCNC: 0.85 MG/DL
CRP SERPL-MCNC: 7 MG/L
EGFRCR SERPLBLD CKD-EPI 2021: 68 ML/MIN/1.73M2
EOSINOPHIL # BLD AUTO: 0.15 K/UL
EOSINOPHIL NFR BLD AUTO: 1.8 %
ERYTHROCYTE [SEDIMENTATION RATE] IN BLOOD BY WESTERGREN METHOD: 58 MM/HR
GLUCOSE SERPL-MCNC: 160 MG/DL
HCT VFR BLD CALC: 39.1 %
HGB BLD-MCNC: 11.7 G/DL
LYMPHOCYTES # BLD AUTO: 2.97 K/UL
LYMPHOCYTES NFR BLD AUTO: 36.8 %
MAN DIFF?: NORMAL
MCHC RBC-ENTMCNC: 27.2 PG
MCHC RBC-ENTMCNC: 29.9 G/DL
MCV RBC AUTO: 90.9 FL
MONOCYTES # BLD AUTO: 0.28 K/UL
MONOCYTES NFR BLD AUTO: 3.5 %
NEUTROPHILS # BLD AUTO: 4.59 K/UL
NEUTROPHILS NFR BLD AUTO: 57 %
PLATELET # BLD AUTO: 230 K/UL
POTASSIUM SERPL-SCNC: 3.7 MMOL/L
PROT SERPL-MCNC: 6.2 G/DL
RBC # BLD: 4.3 M/UL
RBC # FLD: NORMAL
SODIUM SERPL-SCNC: 141 MMOL/L
WBC # FLD AUTO: 8.06 K/UL

## 2025-08-21 ENCOUNTER — NON-APPOINTMENT (OUTPATIENT)
Age: 85
End: 2025-08-21

## 2025-08-22 ENCOUNTER — NON-APPOINTMENT (OUTPATIENT)
Age: 85
End: 2025-08-22

## 2025-08-22 ENCOUNTER — EMERGENCY (EMERGENCY)
Facility: HOSPITAL | Age: 85
LOS: 1 days | End: 2025-08-22
Attending: EMERGENCY MEDICINE
Payer: MEDICARE

## 2025-08-22 VITALS
WEIGHT: 130.07 LBS | OXYGEN SATURATION: 97 % | DIASTOLIC BLOOD PRESSURE: 66 MMHG | HEIGHT: 63 IN | SYSTOLIC BLOOD PRESSURE: 140 MMHG | HEART RATE: 83 BPM | RESPIRATION RATE: 18 BRPM

## 2025-08-22 DIAGNOSIS — H26.9 UNSPECIFIED CATARACT: Chronic | ICD-10-CM

## 2025-08-22 DIAGNOSIS — Z98.89 OTHER SPECIFIED POSTPROCEDURAL STATES: Chronic | ICD-10-CM

## 2025-08-22 LAB
ALBUMIN SERPL ELPH-MCNC: 3.4 G/DL — SIGNIFICANT CHANGE UP (ref 3.3–5)
ALP SERPL-CCNC: 110 U/L — SIGNIFICANT CHANGE UP (ref 40–120)
ALT FLD-CCNC: 8 U/L — LOW (ref 10–45)
ANION GAP SERPL CALC-SCNC: 14 MMOL/L — SIGNIFICANT CHANGE UP (ref 5–17)
AST SERPL-CCNC: 13 U/L — SIGNIFICANT CHANGE UP (ref 10–40)
BILIRUB SERPL-MCNC: 0.4 MG/DL — SIGNIFICANT CHANGE UP (ref 0.2–1.2)
BUN SERPL-MCNC: 32 MG/DL — HIGH (ref 7–23)
CALCIUM SERPL-MCNC: 10.1 MG/DL — SIGNIFICANT CHANGE UP (ref 8.4–10.5)
CHLORIDE SERPL-SCNC: 99 MMOL/L — SIGNIFICANT CHANGE UP (ref 96–108)
CO2 SERPL-SCNC: 23 MMOL/L — SIGNIFICANT CHANGE UP (ref 22–31)
CREAT SERPL-MCNC: 1.26 MG/DL — SIGNIFICANT CHANGE UP (ref 0.5–1.3)
EGFR: 42 ML/MIN/1.73M2 — LOW
EGFR: 42 ML/MIN/1.73M2 — LOW
GLUCOSE SERPL-MCNC: 263 MG/DL — HIGH (ref 70–99)
HCT VFR BLD CALC: 37.9 % — SIGNIFICANT CHANGE UP (ref 34.5–45)
HGB BLD-MCNC: 11.9 G/DL — SIGNIFICANT CHANGE UP (ref 11.5–15.5)
LIDOCAIN IGE QN: 12 U/L — SIGNIFICANT CHANGE UP (ref 7–60)
MCHC RBC-ENTMCNC: 28.8 PG — SIGNIFICANT CHANGE UP (ref 27–34)
MCHC RBC-ENTMCNC: 31.4 G/DL — LOW (ref 32–36)
MCV RBC AUTO: 91.8 FL — SIGNIFICANT CHANGE UP (ref 80–100)
NRBC # BLD AUTO: 0 K/UL — SIGNIFICANT CHANGE UP (ref 0–0)
NRBC # FLD: 0 K/UL — SIGNIFICANT CHANGE UP (ref 0–0)
NRBC BLD AUTO-RTO: 0 /100 WBCS — SIGNIFICANT CHANGE UP (ref 0–0)
PLATELET # BLD AUTO: 269 K/UL — SIGNIFICANT CHANGE UP (ref 150–400)
PMV BLD: 9.1 FL — SIGNIFICANT CHANGE UP (ref 7–13)
POTASSIUM SERPL-MCNC: 3.9 MMOL/L — SIGNIFICANT CHANGE UP (ref 3.5–5.3)
POTASSIUM SERPL-SCNC: 3.9 MMOL/L — SIGNIFICANT CHANGE UP (ref 3.5–5.3)
PROT SERPL-MCNC: 6.2 G/DL — SIGNIFICANT CHANGE UP (ref 6–8.3)
RBC # BLD: 4.13 M/UL — SIGNIFICANT CHANGE UP (ref 3.8–5.2)
RBC # FLD: 21.7 % — HIGH (ref 10.3–14.5)
SODIUM SERPL-SCNC: 136 MMOL/L — SIGNIFICANT CHANGE UP (ref 135–145)
WBC # BLD: 11.94 K/UL — HIGH (ref 3.8–10.5)
WBC # FLD AUTO: 11.94 K/UL — HIGH (ref 3.8–10.5)

## 2025-08-22 PROCEDURE — 99285 EMERGENCY DEPT VISIT HI MDM: CPT

## 2025-08-22 RX ORDER — ACETAMINOPHEN 500 MG/5ML
1000 LIQUID (ML) ORAL ONCE
Refills: 0 | Status: COMPLETED | OUTPATIENT
Start: 2025-08-22 | End: 2025-08-22

## 2025-08-22 RX ORDER — LIDOCAINE HYDROCHLORIDE 20 MG/ML
1 JELLY TOPICAL ONCE
Refills: 0 | Status: COMPLETED | OUTPATIENT
Start: 2025-08-22 | End: 2025-08-22

## 2025-08-22 RX ADMIN — LIDOCAINE HYDROCHLORIDE 1 PATCH: 20 JELLY TOPICAL at 23:19

## 2025-08-22 RX ADMIN — Medication 400 MILLIGRAM(S): at 23:39

## 2025-08-23 VITALS
DIASTOLIC BLOOD PRESSURE: 61 MMHG | SYSTOLIC BLOOD PRESSURE: 113 MMHG | HEART RATE: 92 BPM | OXYGEN SATURATION: 95 % | RESPIRATION RATE: 20 BRPM

## 2025-08-23 LAB
APPEARANCE UR: CLEAR — SIGNIFICANT CHANGE UP
BACTERIA # UR AUTO: NEGATIVE /HPF — SIGNIFICANT CHANGE UP
BASOPHILS # BLD AUTO: 0.05 K/UL — SIGNIFICANT CHANGE UP (ref 0–0.2)
BASOPHILS # BLD MANUAL: 0 K/UL — SIGNIFICANT CHANGE UP (ref 0–0.2)
BASOPHILS NFR BLD AUTO: 0.4 % — SIGNIFICANT CHANGE UP (ref 0–2)
BASOPHILS NFR BLD MANUAL: 0 % — SIGNIFICANT CHANGE UP (ref 0–2)
BILIRUB UR-MCNC: NEGATIVE — SIGNIFICANT CHANGE UP
BLD GP AB SCN SERPL QL: NEGATIVE — SIGNIFICANT CHANGE UP
COLOR SPEC: YELLOW — SIGNIFICANT CHANGE UP
DIFF PNL FLD: NEGATIVE — SIGNIFICANT CHANGE UP
EOSINOPHIL # BLD AUTO: 0.21 K/UL — SIGNIFICANT CHANGE UP (ref 0–0.5)
EOSINOPHIL # BLD MANUAL: 0.31 K/UL — SIGNIFICANT CHANGE UP (ref 0–0.5)
EOSINOPHIL NFR BLD AUTO: 1.8 % — SIGNIFICANT CHANGE UP (ref 0–6)
EOSINOPHIL NFR BLD MANUAL: 2.6 % — SIGNIFICANT CHANGE UP (ref 0–6)
EPI CELLS # UR: 1 — SIGNIFICANT CHANGE UP
GLUCOSE UR QL: ABNORMAL
HYALINE CASTS # UR AUTO: 1 — SIGNIFICANT CHANGE UP
IMM GRANULOCYTES # BLD AUTO: 0.07 K/UL — SIGNIFICANT CHANGE UP (ref 0–0.07)
IMM GRANULOCYTES NFR BLD AUTO: 0.6 % — SIGNIFICANT CHANGE UP (ref 0–0.9)
KETONES UR QL: NEGATIVE — SIGNIFICANT CHANGE UP
LEUKOCYTE ESTERASE UR-ACNC: ABNORMAL
LYMPHOCYTES # BLD AUTO: 3 K/UL — SIGNIFICANT CHANGE UP (ref 1–3.3)
LYMPHOCYTES # BLD MANUAL: 2.41 K/UL — SIGNIFICANT CHANGE UP (ref 1–3.3)
LYMPHOCYTES NFR BLD AUTO: 25.1 % — SIGNIFICANT CHANGE UP (ref 13–44)
LYMPHOCYTES NFR BLD MANUAL: 20.2 % — SIGNIFICANT CHANGE UP (ref 13–44)
MONOCYTES # BLD AUTO: 1.14 K/UL — HIGH (ref 0–0.9)
MONOCYTES # BLD MANUAL: 0.63 K/UL — SIGNIFICANT CHANGE UP (ref 0–0.9)
MONOCYTES NFR BLD AUTO: 9.5 % — SIGNIFICANT CHANGE UP (ref 2–14)
MONOCYTES NFR BLD MANUAL: 5.3 % — SIGNIFICANT CHANGE UP (ref 2–14)
NEUTROPHILS # BLD AUTO: 7.47 K/UL — HIGH (ref 1.8–7.4)
NEUTROPHILS # BLD MANUAL: 8.58 K/UL — HIGH (ref 1.8–7.4)
NEUTROPHILS NFR BLD AUTO: 62.6 % — SIGNIFICANT CHANGE UP (ref 43–77)
NEUTROPHILS NFR BLD MANUAL: 71.9 % — SIGNIFICANT CHANGE UP (ref 43–77)
NITRITE UR-MCNC: NEGATIVE — SIGNIFICANT CHANGE UP
PH UR: 5.5 — SIGNIFICANT CHANGE UP (ref 5–8)
PLAT MORPH BLD: NORMAL — SIGNIFICANT CHANGE UP
PROT UR-MCNC: NEGATIVE — SIGNIFICANT CHANGE UP
RBC BLD AUTO: SIGNIFICANT CHANGE UP
RBC CASTS # UR COMP ASSIST: 0 /HPF — SIGNIFICANT CHANGE UP (ref 0–4)
RH IG SCN BLD-IMP: POSITIVE — SIGNIFICANT CHANGE UP
SP GR SPEC: <=1.005 — SIGNIFICANT CHANGE UP (ref 1–1.03)
UROBILINOGEN FLD QL: SIGNIFICANT CHANGE UP
WBC UR QL: 3 /HPF — SIGNIFICANT CHANGE UP (ref 0–5)

## 2025-08-23 PROCEDURE — 71260 CT THORAX DX C+: CPT

## 2025-08-23 PROCEDURE — 96374 THER/PROPH/DIAG INJ IV PUSH: CPT | Mod: XU

## 2025-08-23 PROCEDURE — 85025 COMPLETE CBC W/AUTO DIFF WBC: CPT

## 2025-08-23 PROCEDURE — 86900 BLOOD TYPING SEROLOGIC ABO: CPT

## 2025-08-23 PROCEDURE — 80053 COMPREHEN METABOLIC PANEL: CPT

## 2025-08-23 PROCEDURE — 86901 BLOOD TYPING SEROLOGIC RH(D): CPT

## 2025-08-23 PROCEDURE — 86850 RBC ANTIBODY SCREEN: CPT

## 2025-08-23 PROCEDURE — 99284 EMERGENCY DEPT VISIT MOD MDM: CPT | Mod: 25

## 2025-08-23 PROCEDURE — 71260 CT THORAX DX C+: CPT | Mod: 26

## 2025-08-23 PROCEDURE — 83690 ASSAY OF LIPASE: CPT

## 2025-08-23 PROCEDURE — 70450 CT HEAD/BRAIN W/O DYE: CPT

## 2025-08-23 PROCEDURE — 74177 CT ABD & PELVIS W/CONTRAST: CPT | Mod: 26

## 2025-08-23 PROCEDURE — 72125 CT NECK SPINE W/O DYE: CPT

## 2025-08-23 PROCEDURE — 70450 CT HEAD/BRAIN W/O DYE: CPT | Mod: 26

## 2025-08-23 PROCEDURE — 74177 CT ABD & PELVIS W/CONTRAST: CPT

## 2025-08-23 PROCEDURE — 72125 CT NECK SPINE W/O DYE: CPT | Mod: 26

## 2025-08-23 PROCEDURE — 81001 URINALYSIS AUTO W/SCOPE: CPT

## 2025-08-28 ENCOUNTER — APPOINTMENT (OUTPATIENT)
Dept: HEMATOLOGY ONCOLOGY | Facility: CLINIC | Age: 85
End: 2025-08-28

## 2025-08-28 ENCOUNTER — RESULT REVIEW (OUTPATIENT)
Age: 85
End: 2025-08-28

## 2025-08-28 VITALS
OXYGEN SATURATION: 98 % | DIASTOLIC BLOOD PRESSURE: 80 MMHG | RESPIRATION RATE: 16 BRPM | HEART RATE: 88 BPM | TEMPERATURE: 97.6 F | WEIGHT: 134.68 LBS | HEIGHT: 63 IN | BODY MASS INDEX: 23.86 KG/M2 | SYSTOLIC BLOOD PRESSURE: 150 MMHG

## 2025-08-28 LAB
ALBUMIN SERPL ELPH-MCNC: 3.6 G/DL
ALP BLD-CCNC: 119 U/L
ALT SERPL-CCNC: 11 U/L
ANION GAP SERPL CALC-SCNC: 14 MMOL/L
AST SERPL-CCNC: 15 U/L
BILIRUB SERPL-MCNC: <0.2 MG/DL
BUN SERPL-MCNC: 22 MG/DL
CALCIUM SERPL-MCNC: 9.6 MG/DL
CHLORIDE SERPL-SCNC: 107 MMOL/L
CO2 SERPL-SCNC: 23 MMOL/L
CREAT SERPL-MCNC: 0.87 MG/DL
EGFRCR SERPLBLD CKD-EPI 2021: 66 ML/MIN/1.73M2
FERRITIN SERPL-MCNC: 303 NG/ML
GLUCOSE SERPL-MCNC: 231 MG/DL
IRON SATN MFR SERPL: 22 %
IRON SERPL-MCNC: 50 UG/DL
POTASSIUM SERPL-SCNC: 4.3 MMOL/L
PROT SERPL-MCNC: 5.9 G/DL
SODIUM SERPL-SCNC: 143 MMOL/L
TIBC SERPL-MCNC: 226 UG/DL
UIBC SERPL-MCNC: 177 UG/DL

## 2025-09-02 ENCOUNTER — APPOINTMENT (OUTPATIENT)
Dept: RHEUMATOLOGY | Facility: CLINIC | Age: 85
End: 2025-09-02
Payer: MEDICARE

## 2025-09-02 ENCOUNTER — LABORATORY RESULT (OUTPATIENT)
Age: 85
End: 2025-09-02

## 2025-09-02 ENCOUNTER — NON-APPOINTMENT (OUTPATIENT)
Age: 85
End: 2025-09-02

## 2025-09-02 VITALS
DIASTOLIC BLOOD PRESSURE: 89 MMHG | TEMPERATURE: 97.3 F | OXYGEN SATURATION: 97 % | SYSTOLIC BLOOD PRESSURE: 165 MMHG | HEART RATE: 77 BPM | RESPIRATION RATE: 16 BRPM

## 2025-09-02 VITALS — OXYGEN SATURATION: 98 % | SYSTOLIC BLOOD PRESSURE: 151 MMHG | DIASTOLIC BLOOD PRESSURE: 70 MMHG | HEART RATE: 67 BPM

## 2025-09-02 PROCEDURE — 36415 COLL VENOUS BLD VENIPUNCTURE: CPT

## 2025-09-02 PROCEDURE — 96365 THER/PROPH/DIAG IV INF INIT: CPT

## 2025-09-02 RX ORDER — ABATACEPT 250 MG/15ML
250 INJECTION, POWDER, LYOPHILIZED, FOR SOLUTION INTRAVENOUS
Qty: 0 | Refills: 0 | Status: COMPLETED
Start: 2025-07-07

## 2025-09-04 LAB
ALBUMIN SERPL ELPH-MCNC: 3.6 G/DL
ALP BLD-CCNC: 103 U/L
ALT SERPL-CCNC: 9 U/L
ANION GAP SERPL CALC-SCNC: 16 MMOL/L
AST SERPL-CCNC: 17 U/L
BASOPHILS # BLD AUTO: 0.07 K/UL
BASOPHILS NFR BLD AUTO: 1 %
BILIRUB SERPL-MCNC: 0.2 MG/DL
BUN SERPL-MCNC: 27 MG/DL
CALCIUM SERPL-MCNC: 9.5 MG/DL
CHLORIDE SERPL-SCNC: 103 MMOL/L
CO2 SERPL-SCNC: 26 MMOL/L
CREAT SERPL-MCNC: 1.04 MG/DL
CRP SERPL-MCNC: 7 MG/L
EGFRCR SERPLBLD CKD-EPI 2021: 53 ML/MIN/1.73M2
EOSINOPHIL # BLD AUTO: 0.35 K/UL
EOSINOPHIL NFR BLD AUTO: 5.1 %
ERYTHROCYTE [SEDIMENTATION RATE] IN BLOOD BY WESTERGREN METHOD: 39 MM/HR
GLUCOSE SERPL-MCNC: 80 MG/DL
HCT VFR BLD CALC: 36 %
HGB BLD-MCNC: 10.9 G/DL
IMM GRANULOCYTES NFR BLD AUTO: 0.3 %
LYMPHOCYTES # BLD AUTO: 2.16 K/UL
LYMPHOCYTES NFR BLD AUTO: 31.5 %
MAN DIFF?: NORMAL
MCHC RBC-ENTMCNC: 29 PG
MCHC RBC-ENTMCNC: 30.3 G/DL
MCV RBC AUTO: 95.7 FL
MONOCYTES # BLD AUTO: 0.43 K/UL
MONOCYTES NFR BLD AUTO: 6.3 %
NEUTROPHILS # BLD AUTO: 3.83 K/UL
NEUTROPHILS NFR BLD AUTO: 55.8 %
PLATELET # BLD AUTO: 248 K/UL
POTASSIUM SERPL-SCNC: 3.9 MMOL/L
PROT SERPL-MCNC: 5.8 G/DL
RBC # BLD: 3.76 M/UL
RBC # FLD: 21.7 %
SODIUM SERPL-SCNC: 145 MMOL/L
WBC # FLD AUTO: 6.86 K/UL